# Patient Record
Sex: FEMALE | Race: WHITE | NOT HISPANIC OR LATINO | Employment: PART TIME | URBAN - METROPOLITAN AREA
[De-identification: names, ages, dates, MRNs, and addresses within clinical notes are randomized per-mention and may not be internally consistent; named-entity substitution may affect disease eponyms.]

---

## 2017-01-24 ENCOUNTER — ALLSCRIPTS OFFICE VISIT (OUTPATIENT)
Dept: OTHER | Facility: OTHER | Age: 22
End: 2017-01-24

## 2017-01-26 ENCOUNTER — TRANSCRIBE ORDERS (OUTPATIENT)
Dept: LAB | Facility: CLINIC | Age: 22
End: 2017-01-26

## 2017-01-26 ENCOUNTER — APPOINTMENT (OUTPATIENT)
Dept: LAB | Facility: CLINIC | Age: 22
End: 2017-01-26
Payer: COMMERCIAL

## 2017-01-26 DIAGNOSIS — R53.83 OTHER MALAISE AND FATIGUE: ICD-10-CM

## 2017-01-26 DIAGNOSIS — Z13.6 SCREENING FOR ISCHEMIC HEART DISEASE: ICD-10-CM

## 2017-01-26 DIAGNOSIS — R42 DIZZINESS AND GIDDINESS: Primary | ICD-10-CM

## 2017-01-26 DIAGNOSIS — R53.81 OTHER MALAISE AND FATIGUE: ICD-10-CM

## 2017-01-26 LAB
ALBUMIN SERPL BCP-MCNC: 3.6 G/DL (ref 3.5–5)
ALP SERPL-CCNC: 64 U/L (ref 46–116)
ALT SERPL W P-5'-P-CCNC: 20 U/L (ref 12–78)
ANION GAP SERPL CALCULATED.3IONS-SCNC: 7 MMOL/L (ref 4–13)
AST SERPL W P-5'-P-CCNC: 16 U/L (ref 5–45)
BASOPHILS # BLD AUTO: 0.02 THOUSANDS/ΜL (ref 0–0.1)
BASOPHILS NFR BLD AUTO: 0 % (ref 0–1)
BILIRUB SERPL-MCNC: 0.39 MG/DL (ref 0.2–1)
BUN SERPL-MCNC: 13 MG/DL (ref 5–25)
CALCIUM SERPL-MCNC: 8.9 MG/DL (ref 8.3–10.1)
CHLORIDE SERPL-SCNC: 104 MMOL/L (ref 100–108)
CHOLEST SERPL-MCNC: 175 MG/DL (ref 50–200)
CO2 SERPL-SCNC: 27 MMOL/L (ref 21–32)
CREAT SERPL-MCNC: 0.58 MG/DL (ref 0.6–1.3)
EOSINOPHIL # BLD AUTO: 0.16 THOUSAND/ΜL (ref 0–0.61)
EOSINOPHIL NFR BLD AUTO: 3 % (ref 0–6)
ERYTHROCYTE [DISTWIDTH] IN BLOOD BY AUTOMATED COUNT: 12.6 % (ref 11.6–15.1)
GFR SERPL CREATININE-BSD FRML MDRD: >60 ML/MIN/1.73SQ M
GLUCOSE SERPL-MCNC: 91 MG/DL (ref 65–140)
HCT VFR BLD AUTO: 38.3 % (ref 34.8–46.1)
HDLC SERPL-MCNC: 61 MG/DL (ref 40–60)
HGB BLD-MCNC: 13.1 G/DL (ref 11.5–15.4)
LDLC SERPL CALC-MCNC: 105 MG/DL (ref 0–100)
LYMPHOCYTES # BLD AUTO: 2.47 THOUSANDS/ΜL (ref 0.6–4.47)
LYMPHOCYTES NFR BLD AUTO: 41 % (ref 14–44)
MCH RBC QN AUTO: 30 PG (ref 26.8–34.3)
MCHC RBC AUTO-ENTMCNC: 34.2 G/DL (ref 31.4–37.4)
MCV RBC AUTO: 88 FL (ref 82–98)
MONOCYTES # BLD AUTO: 0.45 THOUSAND/ΜL (ref 0.17–1.22)
MONOCYTES NFR BLD AUTO: 7 % (ref 4–12)
NEUTROPHILS # BLD AUTO: 2.97 THOUSANDS/ΜL (ref 1.85–7.62)
NEUTS SEG NFR BLD AUTO: 49 % (ref 43–75)
NRBC BLD AUTO-RTO: 0 /100 WBCS
PLATELET # BLD AUTO: 231 THOUSANDS/UL (ref 149–390)
PMV BLD AUTO: 11.7 FL (ref 8.9–12.7)
POTASSIUM SERPL-SCNC: 3.9 MMOL/L (ref 3.5–5.3)
PROT SERPL-MCNC: 7.8 G/DL (ref 6.4–8.2)
RBC # BLD AUTO: 4.37 MILLION/UL (ref 3.81–5.12)
SODIUM SERPL-SCNC: 138 MMOL/L (ref 136–145)
TRIGL SERPL-MCNC: 46 MG/DL
TSH SERPL DL<=0.05 MIU/L-ACNC: 2.32 UIU/ML (ref 0.36–3.74)
WBC # BLD AUTO: 6.07 THOUSAND/UL (ref 4.31–10.16)

## 2017-01-26 PROCEDURE — 86617 LYME DISEASE ANTIBODY: CPT

## 2017-01-26 PROCEDURE — 36415 COLL VENOUS BLD VENIPUNCTURE: CPT

## 2017-01-26 PROCEDURE — 80061 LIPID PANEL: CPT

## 2017-01-26 PROCEDURE — 85025 COMPLETE CBC W/AUTO DIFF WBC: CPT

## 2017-01-26 PROCEDURE — 80053 COMPREHEN METABOLIC PANEL: CPT

## 2017-01-26 PROCEDURE — 84443 ASSAY THYROID STIM HORMONE: CPT

## 2017-01-27 LAB
B BURGDOR IGG PATRN SER IB-IMP: NEGATIVE
B BURGDOR IGM PATRN SER IB-IMP: NEGATIVE
B BURGDOR18KD IGG SER QL IB: PRESENT
B BURGDOR23KD IGG SER QL IB: ABNORMAL
B BURGDOR23KD IGM SER QL IB: ABNORMAL
B BURGDOR28KD IGG SER QL IB: ABNORMAL
B BURGDOR30KD IGG SER QL IB: ABNORMAL
B BURGDOR39KD IGG SER QL IB: ABNORMAL
B BURGDOR39KD IGM SER QL IB: ABNORMAL
B BURGDOR41KD IGG SER QL IB: PRESENT
B BURGDOR41KD IGM SER QL IB: ABNORMAL
B BURGDOR45KD IGG SER QL IB: ABNORMAL
B BURGDOR58KD IGG SER QL IB: ABNORMAL
B BURGDOR66KD IGG SER QL IB: ABNORMAL
B BURGDOR93KD IGG SER QL IB: ABNORMAL

## 2017-01-31 ENCOUNTER — GENERIC CONVERSION - ENCOUNTER (OUTPATIENT)
Dept: OTHER | Facility: OTHER | Age: 22
End: 2017-01-31

## 2017-03-01 ENCOUNTER — ALLSCRIPTS OFFICE VISIT (OUTPATIENT)
Dept: OTHER | Facility: OTHER | Age: 22
End: 2017-03-01

## 2017-04-03 ENCOUNTER — ALLSCRIPTS OFFICE VISIT (OUTPATIENT)
Dept: OTHER | Facility: OTHER | Age: 22
End: 2017-04-03

## 2017-10-09 ENCOUNTER — ALLSCRIPTS OFFICE VISIT (OUTPATIENT)
Dept: OTHER | Facility: OTHER | Age: 22
End: 2017-10-09

## 2017-10-10 ENCOUNTER — GENERIC CONVERSION - ENCOUNTER (OUTPATIENT)
Dept: OTHER | Facility: OTHER | Age: 22
End: 2017-10-10

## 2017-10-10 NOTE — PROGRESS NOTES
Assessment  1  Dizziness (780 4) (R42)   2  Tinnitus (388 30) (H93 19)   3  BMI 26 0-26 9,adult (V85 22) (Z68 26)   4  Need for immunization against influenza (V04 81) (Z23)    Plan  Dizziness, Tinnitus    · Meclizine HCl - 25 MG Oral Tablet; TAKE 1 TABLET 3 TIMES DAILY AS NEEDED   · 1 - Garry Hui MD, Brandon Byrd  (Otolaryngology) Co-Management  *  Status: Active  Requested for:  [id number]  Care Summary provided  : Yes    Discussion/Summary    Will refer to ENT for possible inner ear disturbance  Will trial Meclizine  had CT head done with the birth of her son d/t dizziness after she received pain medication, which she states was normal    Possible side effects of new medications were reviewed with the patient/guardian today  The treatment plan was reviewed with the patient/guardian  The patient/guardian understands and agrees with the treatment plan      Chief Complaint  C/O dizziness that started 3 days ago  States  my body is all weak No headache or fevers [id number]      History of Present Illness  HPI: 3 days ago she started to feel dizzy  Symptoms occur as soon as she stands up and also when she is walking around  She feels like the room is spinning, light headed, and her knees feel weak  Denies chest pain, SOB  Sometimes it feels like her heart is racing  Has been to the ER for this and also for chest pain, negative cardiac workup  Denies headache, visual disturbance  Also notes ringing in her ears when this happens  Denies feeling anxious      Review of Systems    Constitutional: feeling tired, but-no fever-and-no chills  Cardiovascular: palpitations, but-no chest pain-and-no lower extremity edema  Respiratory: shortness of breath, but-no cough-and-no wheezing  Gastrointestinal: no abdominal pain,-no nausea,-no vomiting-and-no diarrhea  Neurological: dizziness, but-no headache  Active Problems  1  BMI 26 0-26 9,adult (V85 22) (Z68 26)   2  Dermatitis (692 9) (L30 9)   3  Dizziness (780 4) (R42)   4   Fatigue (780 79) (R53 83)   5  Never a smoker   6  Strain of muscle, fascia and tendon of lower back, subsequent encounter (846 9)   (G53 286V)    Past Medical History  1  Screening for cardiovascular condition (V81 2) (Z13 6)  Active Problems And Past Medical History Reviewed: The active problems and past medical history were reviewed and updated today  Family History  Mother    1  Family history of thyroid disease (V18 19) (Z83 49)   2  Family history of type 2 diabetes mellitus (V18 0) (Z83 3)  Father    3  Family history of asthma (V17 5) (Z82 5)  Maternal Grandmother    4  Family history of breast cancer (V16 3) (Z80 3)   5  Family history of cardiac disorder (V17 49) (Z82 49)   6  Family history of hypertension (V17 49) (Z82 49)  Paternal Grandmother    9  Family history of malignant neoplasm of thyroid (V16 8) (Z80 8)  Maternal Grandfather    8  Family history of cardiac disorder (V17 49) (Z82 49)   9  Family history of hypertension (V17 49) (Z82 49)  Family History Reviewed: The family history was reviewed and updated today  Social History   · Never a smoker  The social history was reviewed and is unchanged  Surgical History  1  History of  Section   2  History of Tonsillectomy    Current Meds   1  No Reported Medications Recorded    The medication list was reviewed and updated today  Allergies  1  No Known Drug Allergies    Vitals   Recorded: [id number] 02:27PM   Temperature 97 F   Heart Rate 68   Respiration 16   Systolic 98   Diastolic 60   Height 5 ft 1 75 in   Weight 146 lb    BMI Calculated 26 92   BSA Calculated 1 67   LMP [id number]     Physical Exam    Constitutional   General appearance: No acute distress, well appearing and well nourished  Eyes   Conjunctiva and lids: No swelling, erythema or discharge  Pupils and irises: Equal, round and reactive to light      Ears, Nose, Mouth, and Throat   Otoscopic examination: Tympanic membranes translucent with normal light reflex  Canals patent without erythema  Nasal mucosa, septum, and turbinates: Normal without edema or erythema  Oropharynx: Normal with no erythema, edema, exudate or lesions  Pulmonary   Respiratory effort: No increased work of breathing or signs of respiratory distress  Auscultation of lungs: Clear to auscultation  Cardiovascular   Auscultation of heart: Normal rate and rhythm, normal S1 and S2, without murmurs  Examination of extremities for edema and/or varicosities: Normal     Lymphatic   Palpation of lymph nodes in neck: No lymphadenopathy  Skin   Skin and subcutaneous tissue: Normal without rashes or lesions  Additional Exam:  orthostatic VS normal  Normal finger to nose and heel to shin tests  Negative Neftali-Hallpike  Attending Note  Collaborating Physician Note: Collaborating Note: I agree with the Advanced Practitioner note        Signatures   Electronically signed by : Maxine Walsh; Oct  9 2017  3:13PM EST                       (Author)    Electronically signed by : Penny Tyler DO; Oct  9 2017  8:11PM EST                       (Author)

## 2017-12-04 ENCOUNTER — APPOINTMENT (EMERGENCY)
Dept: RADIOLOGY | Facility: HOSPITAL | Age: 22
End: 2017-12-04
Payer: COMMERCIAL

## 2017-12-04 ENCOUNTER — HOSPITAL ENCOUNTER (EMERGENCY)
Facility: HOSPITAL | Age: 22
Discharge: HOME | End: 2017-12-04
Attending: EMERGENCY MEDICINE | Admitting: EMERGENCY MEDICINE
Payer: COMMERCIAL

## 2017-12-04 VITALS
HEART RATE: 89 BPM | BODY MASS INDEX: 25.76 KG/M2 | WEIGHT: 140 LBS | HEIGHT: 62 IN | TEMPERATURE: 98.6 F | DIASTOLIC BLOOD PRESSURE: 55 MMHG | RESPIRATION RATE: 16 BRPM | OXYGEN SATURATION: 98 % | SYSTOLIC BLOOD PRESSURE: 105 MMHG

## 2017-12-04 DIAGNOSIS — M54.9 UPPER BACK PAIN ON LEFT SIDE: Primary | ICD-10-CM

## 2017-12-04 LAB — EXT PREG TEST URINE: NORMAL

## 2017-12-04 PROCEDURE — 71100 X-RAY EXAM RIBS UNI 2 VIEWS: CPT

## 2017-12-04 PROCEDURE — 81025 URINE PREGNANCY TEST: CPT | Performed by: EMERGENCY MEDICINE

## 2017-12-04 PROCEDURE — 99284 EMERGENCY DEPT VISIT MOD MDM: CPT

## 2017-12-04 RX ORDER — NAPROXEN 500 MG/1
500 TABLET ORAL ONCE
Status: COMPLETED | OUTPATIENT
Start: 2017-12-04 | End: 2017-12-04

## 2017-12-04 RX ORDER — NAPROXEN 500 MG/1
500 TABLET ORAL 2 TIMES DAILY WITH MEALS
Qty: 10 TABLET | Refills: 0 | Status: SHIPPED | OUTPATIENT
Start: 2017-12-04 | End: 2018-02-24

## 2017-12-04 RX ADMIN — NAPROXEN 500 MG: 500 TABLET ORAL at 19:33

## 2017-12-05 NOTE — DISCHARGE INSTRUCTIONS
Back Pain   WHAT YOU NEED TO KNOW:   Back pain is common  It can be caused by many conditions, such as arthritis or the breakdown of spinal discs  Your risk for back pain is increased by injuries, lack of activity, or repeated bending and twisting  You may feel sore or stiff on one or both sides of your back  The pain may spread to your buttocks or thighs  DISCHARGE INSTRUCTIONS:   Medicines:   · NSAIDs  help decrease swelling and pain  This medicine is available with or without a doctor's order  NSAIDs can cause stomach bleeding or kidney problems in certain people  If you take blood thinner medicine, always ask your healthcare provider if NSAIDs are safe for you  Always read the medicine label and follow directions  · Acetaminophen  decreases pain  It is available without a doctor's order  Ask how much to take and how often to take it  Follow directions  Acetaminophen can cause liver damage if not taken correctly  · Prescription pain medicine  may be given  Ask your healthcare provider how to take this medicine safely  · Take your medicine as directed  Contact your healthcare provider if you think your medicine is not helping or if you have side effects  Tell him or her if you are allergic to any medicine  Keep a list of the medicines, vitamins, and herbs you take  Include the amounts, and when and why you take them  Bring the list or the pill bottles to follow-up visits  Carry your medicine list with you in case of an emergency  Follow up with your healthcare provider in 2 weeks, or as directed:  Write down your questions so you remember to ask them during your visits  How to manage your back pain:   · Apply ice  on your back or affected area for 15 to 20 minutes every hour or as directed  Use an ice pack, or put crushed ice in a plastic bag  Cover it with a towel  Ice helps prevent tissue damage and decreases pain      · Apply heat  on your back or affected area for 20 to 30 minutes every 2 hours for as many days as directed  Heat helps decrease pain and muscle spasms  · Stay active  as much as you can without causing more pain  Bed rest could make your back pain worse  Avoid heavy lifting until your pain is gone  Return to the emergency department if:   · You have pain, numbness, or weakness in one or both legs  · Your pain becomes so severe that you cannot walk  · You cannot control your urine or bowel movements  · You have severe back pain with chest pain  · You have severe back pain, nausea, and vomiting  · You have severe back pain that spreads to your side or genital area  Contact your healthcare provider if:   · You have back pain that does not get better with rest and pain medicine  · You have a fever  · You have pain that worsens when you are on your back or when you rest     · You have pain that worsens when you cough or sneeze  · You lose weight without trying  · You have questions or concerns about your condition or care  © 2017 [address] Information is for End User's use only and may not be sold, redistributed or otherwise used for commercial purposes  All illustrations and images included in CareNotes® are the copyrighted property of [employer] , [employer]  or Reinaldo Boone  The above information is an  only  It is not intended as medical advice for individual conditions or treatments  Talk to your doctor, nurse or pharmacist before following any medical regimen to see if it is safe and effective for you

## 2017-12-05 NOTE — ED PROVIDER NOTES
History  Chief Complaint   Patient presents with    Back Pain     pain in upper thoracic spine are s/p fall on kitchen floor  5 hrs ago     PT FELL 4 HRS AGO, C/O LT UPPER BACK PAIN        Back Pain   Pain location: LEFT UPPER BACK  Quality:  Aching  Radiates to:  Does not radiate  Pain severity:  Mild  Onset quality:  Sudden  Duration:  4 hours  Timing:  Constant  Chronicity:  New  Context: falling    Relieved by:  None tried  Worsened by:  Nothing  Ineffective treatments:  None tried  Associated symptoms: no headaches        None       No past medical history on file  Past Surgical History:   Procedure Laterality Date     SECTION         No family history on file  I have reviewed and agree with the history as documented  Social History   Substance Use Topics    Smoking status: Never Smoker    Smokeless tobacco: Not on file    Alcohol use No        Review of Systems   Constitutional: Negative  Musculoskeletal: Positive for back pain  Skin: Negative  Neurological: Negative  Negative for headaches  Physical Exam  ED Triage Vitals [17]   Temperature Pulse Respirations Blood Pressure SpO2   98 6 °F (37 °C) 89 16 105/55 98 %      Temp Source Heart Rate Source Patient Position - Orthostatic VS BP Location FiO2 (%)   Tympanic Monitor Sitting Right arm --      Pain Score       8           Orthostatic Vital Signs  Vitals:    17   BP: 105/55   Pulse: 89   Patient Position - Orthostatic VS: Sitting       Physical Exam   Constitutional: She is oriented to person, place, and time  She appears well-developed and well-nourished  No distress  Musculoskeletal: She exhibits tenderness  She exhibits no deformity  Arms:  Neurological: She is alert and oriented to person, place, and time  Skin: Skin is warm and dry  She is not diaphoretic  Nursing note and vitals reviewed        ED Medications  Medications   naproxen (NAPROSYN) tablet 500 mg (500 mg Oral Given 12/4/17 1933)       Diagnostic Studies  Results Reviewed     Procedure Component Value Units Date/Time    POCT pregnancy, urine [[id number]]  (Normal) Resulted:  12/04/17 1933    Lab Status:  Final result Updated:  12/04/17 1933     EXT PREG TEST UR (Ref: Negative) neg                 XR ribs 2 views LEFT   ED Interpretation by Gregoria Bo MD (12/04 2023)   NEG                 Procedures  Procedures       Phone Contacts  ED Phone Contact    ED Course  ED Course                                MDM  CritCare Time    Disposition  Final diagnoses:   None     ED Disposition     None      Follow-up Information    None       Patient's Medications   Discharge Prescriptions    No medications on file     No discharge procedures on file      ED Provider  Electronically Signed by           Gregoria Bo MD  12/04/17 2025

## 2017-12-14 ENCOUNTER — GENERIC CONVERSION - ENCOUNTER (OUTPATIENT)
Dept: OTHER | Facility: OTHER | Age: 22
End: 2017-12-14

## 2018-01-10 NOTE — RESULT NOTES
Verified Results  (1) LYME ANTIBODY, WESTERN BLOT [id number] 10:29AM Shanti Miriam     Test Name Result Flag Reference   LYME 18 KD IGG Present A    LYME 23 KD IGG Absent     LYME 28 KD IGG Absent     LYME 30 KD IGG Absent     LYME 39 KD IGG Absent     LYME 41 KD IGG Present A    LYME 45 KD IGG Absent     LYME 58 KD IGG Absent     LYME 66 KD IGG Absent     LYME 93 KD IGG Absent     LYME 23 KD IGM Absent     LYME 39 KD IGM Absent     LYME 41 KD IGM Absent     LYME IGG WB INTERP  Negative     Positive: 5 of the following                                 Borrelia-specific bands:                                 18,23,28,30,39,41,45,58,                                 66, and 93  Negative: No bands or banding                                 patterns which do not                                 meet positive criteria  LYME IGM WB INTERP  Negative     Note: An equivocal or positive EIA result followed by a negative  Western Blot result is considered NEGATIVE  An equivocal or positive  EIA result followed by a positive Western Blot is considered POSITIVE  by the CDC  Positive: 2 of the following bands: 23,39 or 41  Negative: No bands or banding patterns which do not meet positive  criteria  Criteria for positivity are those recommended by CDC/ASTPHLD   p23=Osp C, h03=igkaayyfh  Note:  Sera from individuals with the following may cross react in the  Lyme Western Blot assays: other spirochetal diseases (periodontal  disease, leptospirosis, relapsing fever, yaws, and pinta);  connective autoimmune (Rheumatoid Arthritis and Systemic Lupus  Erythematosus and also individuals with Antinuclear Antibody);  other infections COFFEE [facility] Spotted Fever; Ghassan-Barr Virus,  and Cytomegalovirus)    Performed at:  [address] [place] [address]  [id number]  : Aline Felipe MD, Phone:  [phone]     (1) CBC/PLT/DIFF [id number] 10:28AM Mellisa Bryant     Test Name Result Flag Reference   WBC COUNT 6 07 Thousand/uL  4 31-10 16   RBC COUNT 4 37 Million/uL  3 81-5 12   HEMOGLOBIN 13 1 g/dL  11 5-15 4   HEMATOCRIT 38 3 %  34 8-46  1   MCV 88 fL  82-98   MCH 30 0 pg  26 8-34 3   MCHC 34 2 g/dL  31 4-37 4   RDW 12 6 %  11 6-15 1   MPV 11 7 fL  8 9-12 7   PLATELET COUNT 882 Thousands/uL  149-390   nRBC AUTOMATED 0 /100 WBCs     NEUTROPHILS RELATIVE PERCENT 49 %  43-75   LYMPHOCYTES RELATIVE PERCENT 41 %  14-44   MONOCYTES RELATIVE PERCENT 7 %  4-12   EOSINOPHILS RELATIVE PERCENT 3 %  0-6   BASOPHILS RELATIVE PERCENT 0 %  0-1   NEUTROPHILS ABSOLUTE COUNT 2 97 Thousands/?L  1 85-7 62   LYMPHOCYTES ABSOLUTE COUNT 2 47 Thousands/?L  0 60-4 47   MONOCYTES ABSOLUTE COUNT 0 45 Thousand/?L  0 17-1 22   EOSINOPHILS ABSOLUTE COUNT 0 16 Thousand/?L  0 00-0 61   BASOPHILS ABSOLUTE COUNT 0 02 Thousands/?L  0 00-0 10     (1) COMPREHENSIVE METABOLIC PANEL [id number] 47:82IJ Anil Gavel     Test Name Result Flag Reference   GLUCOSE,RANDM 91 mg/dL     If the patient is fasting, the ADA then defines impaired fasting glucose as > 100 mg/dL and diabetes as > or equal to 123 mg/dL  SODIUM 138 mmol/L  136-145   POTASSIUM 3 9 mmol/L  3 5-5 3   CHLORIDE 104 mmol/L  100-108   CARBON DIOXIDE 27 mmol/L  21-32   ANION GAP (CALC) 7 mmol/L  4-13   BLOOD UREA NITROGEN 13 mg/dL  5-25   CREATININE 0 58 mg/dL L 0 60-1 30   Standardized to IDMS reference method   CALCIUM 8 9 mg/dL  8 3-10 1   BILI, TOTAL 0 39 mg/dL  0 20-1 00   ALK PHOSPHATAS 64 U/L     ALT (SGPT) 20 U/L  12-78   AST(SGOT) 16 U/L  5-45   ALBUMIN 3 6 g/dL  3 5-5 0   TOTAL PROTEIN 7 8 g/dL  6 4-8 2   eGFR Non-African American      >60 0 ml/min/1 73sq m   [facility] Disease Education Program recommendations are as follows:  GFR calculation is accurate only with a steady state creatinine  Chronic Kidney disease less than 60 ml/min/1 73 sq  meters  Kidney failure less than 15 ml/min/1 73 sq  meters       (1) LIPID PANEL, FASTING [id number] 10:28AM Juventino Richardson     Test Name Result Flag Reference   CHOLESTEROL 175 mg/dL     HDL,DIRECT 61 mg/dL H 40-60   Specimen collection should occur prior to Metamizole administration due to the potential for falsely depressed results  LDL CHOLESTEROL CALCULATED 105 mg/dL H 0-100   Triglyceride:         Normal              <150 mg/dl       Borderline High    150-199 mg/dl       High               200-499 mg/dl       Very High          >499 mg/dl  Cholesterol:         Desirable        <200 mg/dl      Borderline High  200-239 mg/dl      High             >239 mg/dl  HDL Cholesterol:        High    >59 mg/dL      Low     <41 mg/dL  LDL CALCULATED:    This screening LDL is a calculated result  It does not have the accuracy of the Direct Measured LDL in the monitoring of patients with hyperlipidemia and/or statin therapy  Direct Measure LDL (AXV485) must be ordered separately in these patients  TRIGLYCERIDES 46 mg/dL  <=150   Specimen collection should occur prior to N-Acetylcysteine or Metamizole administration due to the potential for falsely depressed results  (1) TSH WITH FT4 REFLEX [id number] 10:28AM Miriam Moser     Test Name Result Flag Reference   TSH 2 320 uIU/mL  0 358-3 740   Patients undergoing fluorescein dye angiography may retain small amounts of fluorescein in the body for 48-72 hours post procedure  Samples containing fluorescein can produce falsely depressed TSH values  If the patient had this procedure,a specimen should be resubmitted post fluorescein clearance  The recommended reference ranges for TSH during pregnancy are as follows:  First trimester 0 1 to 2 5 uIU/mL  Second trimester  0 2 to 3 0 uIU/mL  Third trimester 0 3 to 3 0 uIU/m       Discussion/Summary     Andrey Pop- your blood counts, kidney, liver, and thyroid function are normal   Cholesterol is normal   Lyme test is negative  [address]     Signatures   Electronically signed by : GARRY Mckeon; Jan 31 2017  9:02AM EST                       (Author)

## 2018-01-11 NOTE — MISCELLANEOUS
Provider Comments  Provider Comments:   No show for 40 min IE        Signatures   Electronically signed by : Becky Rodriguez DO; Oct 21 2016  3:35PM EST                       (Author)

## 2018-01-13 VITALS
RESPIRATION RATE: 16 BRPM | HEART RATE: 68 BPM | DIASTOLIC BLOOD PRESSURE: 60 MMHG | BODY MASS INDEX: 26.87 KG/M2 | SYSTOLIC BLOOD PRESSURE: 98 MMHG | TEMPERATURE: 97 F | WEIGHT: 146 LBS | HEIGHT: 62 IN

## 2018-01-13 VITALS
HEART RATE: 74 BPM | SYSTOLIC BLOOD PRESSURE: 110 MMHG | RESPIRATION RATE: 18 BRPM | TEMPERATURE: 97.2 F | WEIGHT: 144 LBS | DIASTOLIC BLOOD PRESSURE: 70 MMHG | BODY MASS INDEX: 26.5 KG/M2 | HEIGHT: 62 IN

## 2018-01-14 VITALS
HEIGHT: 62 IN | BODY MASS INDEX: 26.5 KG/M2 | SYSTOLIC BLOOD PRESSURE: 120 MMHG | RESPIRATION RATE: 16 BRPM | HEART RATE: 72 BPM | TEMPERATURE: 98.6 F | WEIGHT: 144 LBS | DIASTOLIC BLOOD PRESSURE: 70 MMHG

## 2018-01-15 NOTE — MISCELLANEOUS
Provider Comments  Provider Comments:   lmom regarding pt no show on 4/3/2017      Signatures   Electronically signed by : Saray Merrill DO;  Apr  3 2017 12:08PM EST                       (Author)

## 2018-01-22 VITALS
SYSTOLIC BLOOD PRESSURE: 96 MMHG | BODY MASS INDEX: 26.31 KG/M2 | HEIGHT: 62 IN | HEART RATE: 88 BPM | WEIGHT: 143 LBS | DIASTOLIC BLOOD PRESSURE: 58 MMHG | RESPIRATION RATE: 16 BRPM | TEMPERATURE: 98.4 F

## 2018-01-24 VITALS
TEMPERATURE: 98.2 F | BODY MASS INDEX: 25.95 KG/M2 | DIASTOLIC BLOOD PRESSURE: 70 MMHG | WEIGHT: 141 LBS | SYSTOLIC BLOOD PRESSURE: 108 MMHG | HEART RATE: 96 BPM | HEIGHT: 62 IN | RESPIRATION RATE: 16 BRPM

## 2018-02-24 ENCOUNTER — HOSPITAL ENCOUNTER (EMERGENCY)
Facility: HOSPITAL | Age: 23
Discharge: HOME | End: 2018-02-24
Attending: EMERGENCY MEDICINE | Admitting: EMERGENCY MEDICINE
Payer: COMMERCIAL

## 2018-02-24 ENCOUNTER — APPOINTMENT (EMERGENCY)
Dept: RADIOLOGY | Facility: HOSPITAL | Age: 23
End: 2018-02-24
Payer: COMMERCIAL

## 2018-02-24 VITALS
OXYGEN SATURATION: 98 % | SYSTOLIC BLOOD PRESSURE: 110 MMHG | HEART RATE: 92 BPM | TEMPERATURE: 97.8 F | RESPIRATION RATE: 20 BRPM | DIASTOLIC BLOOD PRESSURE: 68 MMHG

## 2018-02-24 DIAGNOSIS — R10.9 ABDOMINAL PAIN: Primary | ICD-10-CM

## 2018-02-24 LAB
BACTERIA UR QL AUTO: ABNORMAL /HPF
BILIRUB UR QL STRIP: NEGATIVE
CLARITY UR: CLEAR
COLOR UR: YELLOW
EXT PREG TEST URINE: NEGATIVE
GLUCOSE UR STRIP-MCNC: NEGATIVE MG/DL
HGB UR QL STRIP.AUTO: ABNORMAL
KETONES UR STRIP-MCNC: NEGATIVE MG/DL
LEUKOCYTE ESTERASE UR QL STRIP: NEGATIVE
NITRITE UR QL STRIP: NEGATIVE
NON-SQ EPI CELLS URNS QL MICRO: ABNORMAL /HPF
PH UR STRIP.AUTO: 5.5 [PH] (ref 5–9)
PROT UR STRIP-MCNC: NEGATIVE MG/DL
RBC #/AREA URNS AUTO: ABNORMAL /HPF
SP GR UR STRIP.AUTO: 1.02 (ref 1–1.03)
UROBILINOGEN UR QL STRIP.AUTO: 0.2 E.U./DL
WBC #/AREA URNS AUTO: ABNORMAL /HPF

## 2018-02-24 PROCEDURE — 81025 URINE PREGNANCY TEST: CPT | Performed by: EMERGENCY MEDICINE

## 2018-02-24 PROCEDURE — 99284 EMERGENCY DEPT VISIT MOD MDM: CPT

## 2018-02-24 PROCEDURE — 81001 URINALYSIS AUTO W/SCOPE: CPT | Performed by: EMERGENCY MEDICINE

## 2018-02-24 PROCEDURE — 74176 CT ABD & PELVIS W/O CONTRAST: CPT

## 2018-02-24 RX ORDER — TRAMADOL HYDROCHLORIDE 50 MG/1
TABLET ORAL
Qty: 6 TABLET | Refills: 0 | Status: SHIPPED | OUTPATIENT
Start: 2018-02-24 | End: 2018-07-03

## 2018-02-24 RX ORDER — NAPROXEN 500 MG/1
500 TABLET ORAL 2 TIMES DAILY WITH MEALS
Qty: 10 TABLET | Refills: 0 | Status: SHIPPED | OUTPATIENT
Start: 2018-02-24 | End: 2018-07-03

## 2018-02-24 RX ORDER — NAPROXEN 500 MG/1
500 TABLET ORAL ONCE
Status: COMPLETED | OUTPATIENT
Start: 2018-02-24 | End: 2018-02-24

## 2018-02-24 RX ORDER — TRAMADOL HYDROCHLORIDE 50 MG/1
50 TABLET ORAL ONCE
Status: COMPLETED | OUTPATIENT
Start: 2018-02-24 | End: 2018-02-24

## 2018-02-24 RX ADMIN — TRAMADOL HYDROCHLORIDE 50 MG: 50 TABLET, FILM COATED ORAL at 21:08

## 2018-02-24 RX ADMIN — NAPROXEN 500 MG: 500 TABLET ORAL at 21:08

## 2018-02-25 NOTE — DISCHARGE INSTRUCTIONS
Abdominal Pain, Ambulatory Care - your CT scan is normal and your pregnancy test is negative  Get the ultrasound scheduled and make a follow up appointment with your doctor  In the meantime, you can use the prescribed medicines and a heating pad as needed for discomfort  GENERAL INFORMATION:   Abdominal pain  can be dull, achy, or sharp  You may have pain in one area of your abdomen, or in your entire abdomen  Your pain may be caused by constipation, food sensitivity or poisoning, infection, or a blockage  Abdominal pain can also be caused by a hernia, appendicitis, or an ulcer  The cause of your abdominal pain may be unknown  Sometimes you need further testing beyond what we can do in the ER  Seek immediate care for the following symptoms:   · New chest pain or shortness of breath    · Pulsing pain in your upper abdomen or lower back that suddenly becomes constant    · Pain in the right lower abdominal area that worsens with movement    · Fever over 100 4°F (38°C) or shaking chills    · Vomiting and you cannot keep food or fluids down    · Pain does not improve or gets worse over the next 8 to 12 hours    · Blood in your vomit or bowel movements, or they look black and tarry    · Skin or the whites of your eyes turn yellow    · Large amount of vaginal bleeding that is not your monthly period  Treatment for abdominal pain  may include medicine to calm your stomach, prevent vomiting, or decrease pain  Follow up with your healthcare provider as directed:  Write down your questions so you remember to ask them during your visits  CARE AGREEMENT:   You have the right to help plan your care  Learn about your health condition and how it may be treated  Discuss treatment options with your caregivers to decide what care you want to receive  You always have the right to refuse treatment  The above information is an  only  It is not intended as medical advice for individual conditions or treatments   Talk to your doctor, nurse or pharmacist before following any medical regimen to see if it is safe and effective for you  © 2014 [address] is for End User's use only and may not be sold, redistributed or otherwise used for commercial purposes  All illustrations and images included in CareNotes® are the copyrighted property of A D A M [employer]  or Reinaldo Boone

## 2018-02-25 NOTE — ED PROVIDER NOTES
History  Chief Complaint   Patient presents with    Abdominal Pain     LLQpain today  states goes up L side and down L thigh  has had menses for past three days which is her normal menses, never had this pain before     19 yo female c/o left lower abdominal pain off and on x 6 hours  Is on day 3 of normal period  Periods have been regular  Is  and sexually active, not using birth control because she is trying to get pregnant  No vomiting, diarrhea, fever, dysuria  Pain radiates into left flank and down left groin and into left thigh  History provided by:  Patient   used: No    Abdominal Pain   Associated symptoms: no chest pain, no cough, no diarrhea, no dysuria, no fever, no nausea, no shortness of breath and no vomiting        Prior to Admission Medications   Prescriptions Last Dose Informant Patient Reported? Taking? Prenatal MV-Min-Fe Fum-FA-DHA (PRENATAL 1 PO)   Yes Yes   Sig: Take by mouth daily      Facility-Administered Medications: None       History reviewed  No pertinent past medical history  Past Surgical History:   Procedure Laterality Date     SECTION         History reviewed  No pertinent family history  I have reviewed and agree with the history as documented  Social History   Substance Use Topics    Smoking status: Never Smoker    Smokeless tobacco: Never Used    Alcohol use No        Review of Systems   Constitutional: Negative  Negative for fever  HENT: Negative  Eyes: Negative  Respiratory: Negative  Negative for cough and shortness of breath  Cardiovascular: Negative  Negative for chest pain  Gastrointestinal: Positive for abdominal pain  Negative for diarrhea, nausea and vomiting  Genitourinary: Negative  Negative for dysuria and flank pain  Musculoskeletal: Negative  Negative for back pain and myalgias  Skin: Negative  Negative for rash  Neurological: Negative  Negative for dizziness and headaches  Hematological: Does not bruise/bleed easily  Psychiatric/Behavioral: Negative  All other systems reviewed and are negative  Physical Exam  ED Triage Vitals [02/24/18 1955]   Temperature Pulse Respirations Blood Pressure SpO2   97 8 °F (36 6 °C) 92 20 110/68 98 %      Temp Source Heart Rate Source Patient Position - Orthostatic VS BP Location FiO2 (%)   Tympanic Monitor Sitting Right arm --      Pain Score       5           Orthostatic Vital Signs  Vitals:    02/24/18 1955   BP: 110/68   Pulse: 92   Patient Position - Orthostatic VS: Sitting       Physical Exam   Constitutional: She appears well-developed and well-nourished  No distress  HENT:   Head: Normocephalic and atraumatic  Eyes: Conjunctivae are normal  Pupils are equal, round, and reactive to light  Neck: Normal range of motion  Neck supple  Cardiovascular: Normal rate, regular rhythm and normal heart sounds  No murmur heard  Pulmonary/Chest: Effort normal and breath sounds normal  No respiratory distress  Abdominal: Soft  Bowel sounds are normal  She exhibits no distension  There is tenderness  There is no guarding  Mild vague ttp suprapubic   Musculoskeletal: Normal range of motion  She exhibits no edema or deformity  Neurological: She is alert  No cranial nerve deficit  Skin: Skin is warm and dry  No rash noted  She is not diaphoretic  No pallor  Psychiatric: She has a normal mood and affect  Her behavior is normal    Nursing note and vitals reviewed        ED Medications  Medications   naproxen (NAPROSYN) tablet 500 mg (not administered)   traMADol (ULTRAM) tablet 50 mg (not administered)       Diagnostic Studies  Results Reviewed     Procedure Component Value Units Date/Time    Urine Microscopic [[id number]]  (Abnormal) Collected:  02/24/18 2019    Lab Status:  Final result Specimen:  Urine from Urine, Clean Catch Updated:  02/24/18 2036     RBC, UA 4-10 (A) /hpf      WBC, UA 0-1 (A) /hpf      Epithelial Cells Occasional /hpf      Bacteria, UA Occasional /hpf     UA w Reflex to Microscopic [[id number]]  (Abnormal) Collected:  02/24/18 2019    Lab Status:  Final result Specimen:  Urine from Urine, Clean Catch Updated:  02/24/18 2028     Color, UA Yellow     Clarity, UA Clear     Specific Gravity, UA 1 025     pH, UA 5 5     Leukocytes, UA Negative     Nitrite, UA Negative     Protein, UA Negative mg/dl      Glucose, UA Negative mg/dl      Ketones, UA Negative mg/dl      Urobilinogen, UA 0 2 E U /dl      Bilirubin, UA Negative     Blood, UA Moderate (A)    POCT pregnancy, urine [[id number]]  (Normal) Resulted:  02/24/18 2026    Lab Status:  Final result Updated:  02/24/18 2026     EXT PREG TEST UR (Ref: Negative) Negative                 CT renal stone study abdomen pelvis without contrast   Final Result by Sarthak Mooney MD (02/24 2050)      No renal calculi or obstructive uropathy  No acute abdominal or pelvic abnormality  Workstation performed: [id number]         US pelvis complete non OB    (Results Pending)              Procedures  Procedures       Phone Contacts  ED Phone Contact    ED Course  ED Course                                MDM  Number of Diagnoses or Management Options  Diagnosis management comments: Preg  Test negative, CT Evaluation negative, pt  Will need US to fully r/o ovarian cyst   Will tx pain and advise rest, fluids, heating pad, follow up outpt  CritCare Time    Disposition  Final diagnoses:   Abdominal pain     Time reflects when diagnosis was documented in both MDM as applicable and the Disposition within this note     Time User Action Codes Description Comment    8/01/1390  2:95 PM Zaida PIERSON Add [F81 4] Abdominal pain       ED Disposition     ED Disposition Condition Comment    Discharge  Dominick Found discharge to home/self care      Condition at discharge: Stable        Follow-up Information     Follow up With Specialties Details Why [address], DO Family Medicine Schedule an appointment as soon as possible for a visit  [facility] [place] Micky Baldwin  [phone]          Patient's Medications   Discharge Prescriptions    NAPROXEN (NAPROSYN) 500 MG TABLET    Take 1 tablet (500 mg total) by mouth 2 (two) times a day with meals       Start Date: 2/24/2018 End Date: --       Order Dose: 500 mg       Quantity: 10 tablet    Refills: 0    TRAMADOL (ULTRAM) 50 MG TABLET    1 q 6 hours prn severe pain       Start Date: 2/24/2018 End Date: --       Order Dose: --       Quantity: 6 tablet    Refills: 0       Outpatient Discharge Orders  US pelvis complete non OB   Standing Status: Future  Standing Exp   Date: 02/24/22         ED Provider  Electronically Signed by           Marci Rivas MD  72/74/56 0114

## 2018-04-27 ENCOUNTER — TRANSCRIBE ORDERS (OUTPATIENT)
Dept: ADMINISTRATIVE | Facility: HOSPITAL | Age: 23
End: 2018-04-27

## 2018-04-27 DIAGNOSIS — N97.9 PRIMARY FEMALE INFERTILITY: Primary | ICD-10-CM

## 2018-04-28 ENCOUNTER — HOSPITAL ENCOUNTER (OUTPATIENT)
Dept: RADIOLOGY | Facility: HOSPITAL | Age: 23
Discharge: HOME | End: 2018-04-28
Payer: COMMERCIAL

## 2018-04-28 DIAGNOSIS — N97.9 PRIMARY FEMALE INFERTILITY: ICD-10-CM

## 2018-04-28 PROCEDURE — 76856 US EXAM PELVIC COMPLETE: CPT

## 2018-04-28 PROCEDURE — 76830 TRANSVAGINAL US NON-OB: CPT

## 2018-05-23 ENCOUNTER — OFFICE VISIT (OUTPATIENT)
Dept: FAMILY MEDICINE CLINIC | Facility: CLINIC | Age: 23
End: 2018-05-23
Payer: COMMERCIAL

## 2018-05-23 VITALS
BODY MASS INDEX: 27.6 KG/M2 | HEIGHT: 62 IN | WEIGHT: 150 LBS | TEMPERATURE: 98.8 F | HEART RATE: 72 BPM | DIASTOLIC BLOOD PRESSURE: 62 MMHG | SYSTOLIC BLOOD PRESSURE: 100 MMHG | RESPIRATION RATE: 18 BRPM

## 2018-05-23 DIAGNOSIS — J30.1 NON-SEASONAL ALLERGIC RHINITIS DUE TO POLLEN: ICD-10-CM

## 2018-05-23 DIAGNOSIS — R53.83 OTHER FATIGUE: ICD-10-CM

## 2018-05-23 DIAGNOSIS — J06.9 ACUTE URI: Primary | ICD-10-CM

## 2018-05-23 DIAGNOSIS — E07.9 THYROID DISORDER: ICD-10-CM

## 2018-05-23 PROBLEM — J30.9 ALLERGIC RHINITIS: Status: ACTIVE | Noted: 2017-12-14

## 2018-05-23 PROBLEM — L30.9 DERMATITIS: Status: ACTIVE | Noted: 2017-01-24

## 2018-05-23 PROBLEM — H93.19 TINNITUS: Status: ACTIVE | Noted: 2017-10-09

## 2018-05-23 PROCEDURE — 99214 OFFICE O/P EST MOD 30 MIN: CPT | Performed by: FAMILY MEDICINE

## 2018-05-23 RX ORDER — FLUTICASONE PROPIONATE 50 MCG
2 SPRAY, SUSPENSION (ML) NASAL DAILY
COMMUNITY
Start: 2017-12-14 | End: 2018-07-03

## 2018-05-23 RX ORDER — LEVOCETIRIZINE DIHYDROCHLORIDE 5 MG/1
1 TABLET, FILM COATED ORAL DAILY
COMMUNITY
Start: 2017-12-14 | End: 2018-07-03

## 2018-05-23 RX ORDER — AZITHROMYCIN 250 MG/1
TABLET, FILM COATED ORAL
Qty: 6 TABLET | Refills: 0 | Status: SHIPPED | OUTPATIENT
Start: 2018-05-23 | End: 2018-05-28

## 2018-05-23 NOTE — PROGRESS NOTES
Assessment/Plan:    No problem-specific Assessment & Plan notes found for this encounter  Thyroid enlargement, check tsh/US, may need ENT eval next advised  Uri new  Fatigue w/u, tsh and cbc  Allergies stable     Diagnoses and all orders for this visit:    Acute URI  -     azithromycin (ZITHROMAX) 250 mg tablet; Take 500mg on day 1, 250mg on days 2-5    Thyroid disorder  -     T4, free; Future  -     TSH, 3rd generation; Future  -     US thyroid; Future    Non-seasonal allergic rhinitis due to pollen    Other fatigue  -     CBC and differential; Future    Other orders  -     fluticasone (FLONASE) 50 mcg/act nasal spray; 2 sprays into each nostril daily  -     levocetirizine (XYZAL) 5 MG tablet; Take 1 tablet by mouth daily  -     VENTOLIN  (90 Base) MCG/ACT inhaler; INHALE 2 PUFF(S) EVERY 4 HOURS BY INHALATION ROUTE AS NEEDED  add zpack if no better on amoxil in few days  otc      No Follow-up on file  Subjective:      Patient ID: Angelica Moran is a 24 y o  female      Chief Complaint   Patient presents with    Cold Like Symptoms     congestion    Fever     101  klm lpn       HPI  Fever,uri sx, planning conception  Sore throat  Runny nose  Irritated throat  Hx of thyroid disorder  [facility]  No sick contacts    Stopped the thyroid meds, was on levothyroxine  Had US, told it was fine, saw endocrinologist  Neck seems more enlarged in past months  No palpitations or probs with menses  Periods are not heavy, feels tired often  Has not had f/u  Mother feels her neck seems enlarging lately    Has Son 3yo  Fever 80  Son also with uri currently, no specific dx  She is currently on amoxil 500mg tid, from dentist, just started, tooth issue, day 1  The following portions of the patient's history were reviewed and updated as appropriate: allergies, current medications, past family history, past medical history, past social history, past surgical history and problem list     Review of Systems   HENT: Negative for ear pain  Respiratory: Negative for shortness of breath and stridor  some discomfort when she swallows, in the suprasternal notch area  Allergies stable    Current Outpatient Prescriptions   Medication Sig Dispense Refill    fluticasone (FLONASE) 50 mcg/act nasal spray 2 sprays into each nostril daily      levocetirizine (XYZAL) 5 MG tablet Take 1 tablet by mouth daily      naproxen (NAPROSYN) 500 mg tablet Take 1 tablet (500 mg total) by mouth 2 (two) times a day with meals 10 tablet 0    Prenatal MV-Min-Fe Fum-FA-DHA (PRENATAL 1 PO) Take by mouth daily      traMADol (ULTRAM) 50 mg tablet 1 q 6 hours prn severe pain 6 tablet 0    VENTOLIN  (90 Base) MCG/ACT inhaler INHALE 2 PUFF(S) EVERY 4 HOURS BY INHALATION ROUTE AS NEEDED   0    azithromycin (ZITHROMAX) 250 mg tablet Take 500mg on day 1, 250mg on days 2-5 6 tablet 0     No current facility-administered medications for this visit  Objective:    /62   Pulse 72   Temp 98 8 °F (37 1 °C)   Resp 18   Ht 5' 2" (1 575 m)   Wt 68 kg (150 lb)   BMI 27 44 kg/m²        Physical Exam   Constitutional: She appears well-developed  HENT:   Head: Normocephalic  Eyes: Conjunctivae are normal  Right eye exhibits no discharge  Left eye exhibits no discharge  Neck: Neck supple  No tracheal deviation present  Thyromegaly present  Cardiovascular: Normal rate and intact distal pulses  Pulmonary/Chest: Effort normal  No respiratory distress  Abdominal: Soft  Musculoskeletal: She exhibits no edema or deformity  Lymphadenopathy:     She has no cervical adenopathy  Neurological: She is alert  Skin: Skin is warm and dry  Psychiatric: Her behavior is normal  Thought content normal    Nursing note and vitals reviewed       Nasal candido b/l  Tonsils absent  Coarse cough       Srikanth Sprung, DO

## 2018-05-23 NOTE — PATIENT INSTRUCTIONS
Add on the zithromax antibiotic (to the amoxicillin) if your symptoms and mucus do not improve in the next 4-5days

## 2018-06-08 ENCOUNTER — HOSPITAL ENCOUNTER (OUTPATIENT)
Dept: RADIOLOGY | Facility: HOSPITAL | Age: 23
Discharge: HOME | End: 2018-06-08
Attending: FAMILY MEDICINE

## 2018-06-08 DIAGNOSIS — E07.9 THYROID DISORDER: ICD-10-CM

## 2018-06-16 ENCOUNTER — TELEPHONE (OUTPATIENT)
Dept: FAMILY MEDICINE CLINIC | Facility: CLINIC | Age: 23
End: 2018-06-16

## 2018-06-16 NOTE — TELEPHONE ENCOUNTER
Pt calling for results of u/s she had 6/8  Still not in system, can we please call radiology Monday and check on this

## 2018-06-18 DIAGNOSIS — E04.1 LEFT THYROID NODULE: Primary | ICD-10-CM

## 2018-07-03 ENCOUNTER — HOSPITAL ENCOUNTER (EMERGENCY)
Facility: HOSPITAL | Age: 23
Discharge: HOME | End: 2018-07-04
Attending: EMERGENCY MEDICINE
Payer: COMMERCIAL

## 2018-07-03 ENCOUNTER — APPOINTMENT (EMERGENCY)
Dept: RADIOLOGY | Facility: HOSPITAL | Age: 23
End: 2018-07-03
Payer: COMMERCIAL

## 2018-07-03 VITALS
TEMPERATURE: 98.6 F | HEART RATE: 73 BPM | SYSTOLIC BLOOD PRESSURE: 110 MMHG | RESPIRATION RATE: 16 BRPM | OXYGEN SATURATION: 97 % | BODY MASS INDEX: 27.44 KG/M2 | WEIGHT: 150 LBS | DIASTOLIC BLOOD PRESSURE: 63 MMHG

## 2018-07-03 DIAGNOSIS — S16.1XXA CERVICAL STRAIN: Primary | ICD-10-CM

## 2018-07-03 DIAGNOSIS — M79.18 MUSCULOSKELETAL PAIN: ICD-10-CM

## 2018-07-03 PROCEDURE — 72125 CT NECK SPINE W/O DYE: CPT

## 2018-07-03 RX ORDER — CYCLOBENZAPRINE HCL 10 MG
10 TABLET ORAL 3 TIMES DAILY PRN
Qty: 15 TABLET | Refills: 0 | Status: SHIPPED | OUTPATIENT
Start: 2018-07-03 | End: 2019-01-17

## 2018-07-03 RX ORDER — PREDNISONE 20 MG/1
60 TABLET ORAL ONCE
Status: COMPLETED | OUTPATIENT
Start: 2018-07-03 | End: 2018-07-03

## 2018-07-03 RX ORDER — CYCLOBENZAPRINE HCL 10 MG
10 TABLET ORAL ONCE
Status: COMPLETED | OUTPATIENT
Start: 2018-07-03 | End: 2018-07-03

## 2018-07-03 RX ORDER — METHYLPREDNISOLONE 4 MG/1
TABLET ORAL
Qty: 21 TABLET | Refills: 0 | Status: SHIPPED | OUTPATIENT
Start: 2018-07-03 | End: 2019-01-17

## 2018-07-03 RX ORDER — LIDOCAINE 50 MG/G
1 PATCH TOPICAL ONCE
Status: DISCONTINUED | OUTPATIENT
Start: 2018-07-03 | End: 2018-07-04 | Stop reason: HOSPADM

## 2018-07-03 RX ORDER — TRAMADOL HYDROCHLORIDE 50 MG/1
50 TABLET ORAL EVERY 6 HOURS PRN
Qty: 20 TABLET | Refills: 0 | Status: SHIPPED | OUTPATIENT
Start: 2018-07-03 | End: 2019-01-17

## 2018-07-03 RX ADMIN — CYCLOBENZAPRINE HYDROCHLORIDE 10 MG: 10 TABLET, FILM COATED ORAL at 22:59

## 2018-07-03 RX ADMIN — PREDNISONE 60 MG: 20 TABLET ORAL at 22:58

## 2018-07-03 RX ADMIN — LIDOCAINE 1 PATCH: 50 PATCH CUTANEOUS at 22:58

## 2018-07-04 PROCEDURE — 99283 EMERGENCY DEPT VISIT LOW MDM: CPT

## 2018-07-04 NOTE — DISCHARGE INSTRUCTIONS
Cervical Strain   WHAT YOU NEED TO KNOW:   A cervical strain is a stretched or torn muscle or tendon in your neck  Tendons are strong tissues that connect muscles to bones  Common causes of cervical strains include a car accident, a fall, or a sports injury  DISCHARGE INSTRUCTIONS:   Return to the emergency department if:   · You have pain or numbness from your shoulder down to your hand  · You have problems with your vision, hearing, or balance  · You feel confused or cannot concentrate  · You have problems with movement and strength  Contact your healthcare provider if:   · You have increased swelling or pain in your neck  · You have questions or concerns about your condition or care  Medicines: You may need any of the following:  · Acetaminophen  decreases pain and fever  It is available without a doctor's order  Ask how much to take and how often to take it  Follow directions  Read the labels of all other medicines you are using to see if they also contain acetaminophen, or ask your doctor or pharmacist  Acetaminophen can cause liver damage if not taken correctly  Do not use more than 4 grams (4,000 milligrams) total of acetaminophen in one day  · NSAIDs , such as ibuprofen, help decrease swelling, pain, and fever  This medicine is available with or without a doctor's order  NSAIDs can cause stomach bleeding or kidney problems in certain people  If you take blood thinner medicine, always ask your healthcare provider if NSAIDs are safe for you  Always read the medicine label and follow directions  · Muscle relaxers  help decrease pain and muscle spasms  · Prescription pain medicine  may be given  Ask your healthcare provider how to take this medicine safely  Some prescription pain medicines contain acetaminophen  Do not take other medicines that contain acetaminophen without talking to your healthcare provider  Too much acetaminophen may cause liver damage   Prescription pain medicine may cause constipation  Ask your healthcare provider how to prevent or treat constipation  · Take your medicine as directed  Contact your healthcare provider if you think your medicine is not helping or if you have side effects  Tell him or her if you are allergic to any medicine  Keep a list of the medicines, vitamins, and herbs you take  Include the amounts, and when and why you take them  Bring the list or the pill bottles to follow-up visits  Carry your medicine list with you in case of an emergency  Manage your symptoms:   · Apply heat  on your neck for 15 to 20 minutes, 4 to 6 times a day or as directed  Heat helps decrease pain, stiffness, and muscle spasms  · Begin gentle neck exercises  as soon as you can move your neck without pain  Exercises will help decrease stiffness and improve the strength and movement of your neck  Ask your healthcare provider what kind of exercises you should do  · Gradually return to your usual activities as directed  Stop if you have pain  Avoid activities that can cause more damage to your neck, such as heavy lifting or strenuous exercise  · Sleep without a pillow  to help decrease pain  Instead, roll a small towel tightly and place it under your neck  · Go to physical therapy as directed  A physical therapist teaches you exercises to help improve movement and strength, and to decrease pain  Prevent neck injury:   · Drive safely  Make sure everyone in your car wears a seatbelt  A seatbelt can save your life if you are in an accident  Do not use your cell phone when you are driving  This could distract you and cause an accident  Pull over if you need to make a call or send a text message  · Wear helmets, lifejackets, and protective gear  Always wear a helmet when you ride a bike or motorcycle, go skiing, or play sports that could cause a head injury  Wear protective equipment when you play sports   Wear a lifejacket when you are on a boat or doing water sports  Follow up with your healthcare provider as directed: You may be referred to an orthopedist or physical therapies  Write down your questions so you remember to ask them during your visits  © 2017 [facility] Information is for End User's use only and may not be sold, redistributed or otherwise used for commercial purposes  All illustrations and images included in CareNotes® are the copyrighted property of A D [employer] M [employer] Inc  or [employer]  The above information is an  only  It is not intended as medical advice for individual conditions or treatments  Talk to your doctor, nurse or pharmacist before following any medical regimen to see if it is safe and effective for you  Muscle Strain   WHAT YOU NEED TO KNOW:   A muscle strain is a twist, pull, or tear of a muscle or tendon  A tendon is a strong elastic tissue that connects a muscle to a bone  Signs of a strained muscle include bruising and swelling over the area, pain with movement, and loss of strength  DISCHARGE INSTRUCTIONS:   Return to the emergency department if:   · You suddenly cannot feel or move your injured muscle  Contact your healthcare provider if:   · Your pain and swelling worsen or do not go away  · You have questions or concerns about your condition or care  Medicines:   · NSAIDs  help decrease swelling and pain or fever  This medicine is available with or without a doctor's order  NSAIDs can cause stomach bleeding or kidney problems in certain people  If you take blood thinner medicine, always ask your healthcare provider if NSAIDs are safe for you  Always read the medicine label and follow directions  · Muscle relaxers  help decrease pain and muscle spasms  · Take your medicine as directed  Contact your healthcare provider if you think your medicine is not helping or if you have side effects  Tell him of her if you are allergic to any medicine   Keep a list of the medicines, vitamins, and herbs you take  Include the amounts, and when and why you take them  Bring the list or the pill bottles to follow-up visits  Carry your medicine list with you in case of an emergency  Follow up with your healthcare provider as directed: Your healthcare provider may suggest that you have a follow-up visit before you go back to your usual activity  Write down your questions so you remember to ask them during your visits  Self-care:   · 3 to 7 days after the injury:  Use Rest, Ice, Compression, and Elevation (RICE) to help stop bruising and decrease pain and swelling  ¨ Rest:  Rest your muscle to allow your injury to heal  When the pain decreases, begin normal, slow movements  For mild and moderate muscle strains, you should rest your muscles for about 2 days  However, if you have a severe muscle strain, you should rest for 10 to 14 days  You may need to use crutches to walk if your muscle strain is in your legs or lower body  ¨ Ice:  Put an ice pack on the injured area  Put a towel between the ice pack and your skin  Do not put the ice pack directly on your skin  You can use a package of frozen peas instead of an ice pack  ¨ Compression:  You may need to wrap an elastic bandage around the area to decrease swelling  It should be tight enough for you to feel support  Do not wrap it too tightly  ¨ Elevation:  Keep the injured muscle raised above your heart if possible  For example if you have a strain of your lower leg muscle, lie down and prop your leg up on pillows  This helps decrease pain and swelling  · 3 to 21 days after the injury:  Start to slowly and regularly exercise your muscle  This will help it heal  If you feel pain, decrease how hard you are exercising  · 1 to 6 weeks after the injury:  Stretch the injured muscle  Hold the stretch for about 30 seconds  Do this 4 times a day  You may stretch the muscle until you feel a slight pull   Stop stretching if you feel pain  · 2 weeks to 6 months after the injury:  The goal of this phase is to return to the activity you were doing before the injury happened, without hurting the muscle again  · 3 weeks to 6 months after the injury:  Keep stretching and strengthening your muscles to avoid injury  Slowly increase the time and distance that you exercise  You may have signs and symptoms of muscle strain 6 months after the injury, even if you do things to help it heal  In this case, you may need surgery on the muscle  © 2017 [employer]  Information is for End User's use only and may not be sold, redistributed or otherwise used for commercial purposes  All illustrations and images included in CareNotes® are the copyrighted property of A [employer] M [employer] Inc  or [employer]  The above information is an  only  It is not intended as medical advice for individual conditions or treatments  Talk to your doctor, nurse or pharmacist before following any medical regimen to see if it is safe and effective for you  Musculoskeletal Pain   WHAT YOU NEED TO KNOW:   Muscle pain can be dull, achy, or sharp  You may have pain and tenderness to the touch as well  It can occur anywhere on your body and is often brought on by exercise  Muscle pain may occur from an injury, such as a sprain, tendonitis, or bone fracture  Muscle pain can also be the result of medical conditions, such as polymyositis, fibromyalgia, and connective tissue disorders  DISCHARGE INSTRUCTIONS:   Self care:   · Rest  as directed and avoid activity that causes pain  You may be able to return to normal activity when you can move without pain  Follow directions for rest and activity  You are at risk for injury for 3 weeks after your symptoms go away  · Ice  your painful muscle area to decrease pain and swelling  Use an ice pack, or put ice in a plastic bag and cover it with a towel   Always  put a cloth between the ice and your skin  Apply the ice as often as directed for the first 24 to 48 hours  · Compression  with a splint, brace, or elastic bandage helps decrease pain and swelling  This may be needed for muscle pain in arms or legs  A splint, brace, or bandage will also help protect the painful area when you move around  · Elevate  a painful arm or leg to reduce swelling and pain  Elevate your limb while you are sitting or lying down  Prop a painful leg on pillows to keep it above the level of your heart  Medicines:   · NSAIDs  help decrease swelling and pain or fever  This medicine is available with or without a doctor's order  NSAIDs can cause stomach bleeding or kidney problems in certain people  If you take blood thinner medicine, always ask your healthcare provider if NSAIDs are safe for you  Always read the medicine label and follow directions  · Acetaminophen  is used to decrease pain  It is available without a doctor's order  Ask your healthcare provider how much to take and when to take it  Follow directions  Acetaminophen can cause liver damage if not taken correctly  Do not take more than one medicine that contains acetaminophen unless directed  · Muscle relaxers  help relax your muscles to decrease pain and muscle spasms  · Steroids  may be given to decrease redness, pain, and swelling  · Take your medicine as directed  Contact your healthcare provider if you think your medicine is not helping or if you have side effects  Tell him if you are allergic to any medicine  Keep a list of the medicines, vitamins, and herbs you take  Include the amounts, and when and why you take them  Bring the list or the pill bottles to follow-up visits  Carry your medicine list with you in case of an emergency  Follow up with your healthcare provider as directed: You may need more tests to help healthcare providers find the cause of your muscle pain   You may need physical therapy to learn muscle strengthening exercises  Write down your questions so you remember to ask them during your visits  Contact your healthcare provider if:   · You have a fever  · You have trouble sleeping because of your pain  · Your painful area becomes more tender, red, and warm to the touch  · You have decreased movement of the painful area  · You have questions or concerns about your condition or care  Return to the emergency department if:   · You have increased severe pain when you move the painful muscle area  · You lose feeling in your painful muscle area  · You have new or worse swelling in the painful area  Your skin may feel tight  · You have increased muscle pain or pain that does not improve with treatment  © 2017 [facility] Information is for End User's use only and may not be sold, redistributed or otherwise used for commercial purposes  All illustrations and images included in CareNotes® are the copyrighted property of [employer]  or [employer]  The above information is an  only  It is not intended as medical advice for individual conditions or treatments  Talk to your doctor, nurse or pharmacist before following any medical regimen to see if it is safe and effective for you

## 2018-07-04 NOTE — ED PROVIDER NOTES
History  Chief Complaint   Patient presents with   Karla Boyd     tripped on toy in kitchen this afternoon and fell onto side    Back Pain     pain between shouderblades , neck brace applied in triage     67-year-old female presents with complaints of right-sided neck and upper back pain  Patient states she tripped over 20 the kitchen and fell onto her right side  Patient has had pain and now some swelling in the area with tenderness to palpation  No motor or sensory deficits, however it is painful to move the right upper extremity  No obvious deformity  Patient placed in a collar here in the ED  Patient denies any prior back injury        History provided by:  Patient and significant other  Fall   Mechanism of injury: fall    Injury location:  Shoulder/arm  Shoulder/arm injury location:  R shoulder  Incident location:  Kitchen  Time since incident:  5 hours  Arrived directly from scene: no    Fall:     Fall occurred:  Walking and tripped    Impact surface:  Hard floor    Point of impact: Right side of body  Suspicion of alcohol use: no    Suspicion of drug use: no    Prior to arrival data:     Patient ambulatory at scene: yes      Blood loss:  None    Responsiveness at scene:  Alert  Associated symptoms: back pain and neck pain    Associated symptoms: no abdominal pain, no difficulty breathing, no headaches, no loss of consciousness, no nausea and no vomiting    Back Pain   Associated symptoms: no abdominal pain and no headaches        None       History reviewed  No pertinent past medical history  Past Surgical History:   Procedure Laterality Date     SECTION         History reviewed  No pertinent family history  I have reviewed and agree with the history as documented  Social History   Substance Use Topics    Smoking status: Never Smoker    Smokeless tobacco: Never Used    Alcohol use No        Review of Systems   Constitutional: Negative  HENT: Negative  Respiratory: Negative  Cardiovascular: Negative  Gastrointestinal: Negative for abdominal pain, nausea and vomiting  Genitourinary: Negative  Musculoskeletal: Positive for back pain and neck pain  Negative for gait problem  Skin: Negative  Neurological: Negative for loss of consciousness and headaches  Hematological: Negative  Psychiatric/Behavioral: Negative  All other systems reviewed and are negative  Physical Exam  Physical Exam   Constitutional: She is oriented to person, place, and time  She appears well-developed and well-nourished  HENT:   Head: Normocephalic and atraumatic  Right Ear: External ear normal    Left Ear: External ear normal    Nose: Nose normal    Mouth/Throat: Oropharynx is clear and moist    Eyes: Conjunctivae and EOM are normal    Neck: Muscular tenderness present  Edema and decreased range of motion present  No thyromegaly present  Cardiovascular: Normal rate, regular rhythm, normal heart sounds and intact distal pulses  Pulmonary/Chest: Effort normal and breath sounds normal  No stridor  Abdominal: Soft  Bowel sounds are normal    Neurological: She is alert and oriented to person, place, and time  Skin: Skin is warm and dry  Capillary refill takes less than 2 seconds  Psychiatric: She has a normal mood and affect  Her behavior is normal  Judgment and thought content normal    Nursing note and vitals reviewed        Vital Signs  ED Triage Vitals [07/03/18 2149]   Temperature Pulse Respirations Blood Pressure SpO2   98 6 °F (37 °C) 73 16 110/63 97 %      Temp Source Heart Rate Source Patient Position - Orthostatic VS BP Location FiO2 (%)   Tympanic Monitor Sitting Left arm --      Pain Score       Worst Possible Pain           Vitals:    07/03/18 2149   BP: 110/63   Pulse: 73   Patient Position - Orthostatic VS: Sitting       Visual Acuity      ED Medications  Medications   lidocaine (LIDODERM) 5 % patch 1 patch (1 patch Transdermal Medication Applied 7/3/18 8306) cyclobenzaprine (FLEXERIL) tablet 10 mg (10 mg Oral Given 7/3/18 3486)   predniSONE tablet 60 mg (60 mg Oral Given 7/3/18 9693)       Diagnostic Studies  Results Reviewed     None                 CT cervical spine without contrast   Final Result by Shellie Amor MD (07/03 3551)      No cervical spine fracture or traumatic malalignment  Workstation performed: [id number]                    Procedures  Procedures       Phone Contacts  ED Phone Contact    ED Course                               MDM  CritCare Time    Disposition  Final diagnoses:   Cervical strain   Musculoskeletal pain - Right upper back     Time reflects when diagnosis was documented in both MDM as applicable and the Disposition within this note     Time User Action Codes Description Comment    7/3/2018 11:09 PM Matthews Pelt Add [S16  1XXA] Cervical strain     7/3/2018 11:11 PM Matthews Pelt Add [M79 1] Musculoskeletal pain     7/3/2018 11:11 PM Matthews Pelt Modify [M79 1] Musculoskeletal pain Right upper back      ED Disposition     ED Disposition Condition Comment    Discharge  [place] [facility] discharge to home/self care      Condition at discharge: Stable        Follow-up Information     Follow up With Specialties Details Why Contact Odalis Valerio DO Family Medicine Schedule an appointment as soon as possible for a visit in 1 week  [address] [place] [address]  [phone]            Patient's Medications   Discharge Prescriptions    CYCLOBENZAPRINE (FLEXERIL) 10 MG TABLET    Take 1 tablet (10 mg total) by mouth 3 (three) times a day as needed for muscle spasms for up to 15 doses       Start Date: 7/3/2018  End Date: --       Order Dose: 10 mg       Quantity: 15 tablet    Refills: 0    METHYLPREDNISOLONE 4 MG TBPK    Use as directed on package       Start Date: 7/3/2018  End Date: --       Order Dose: --       Quantity: 21 tablet    Refills: 0    TRAMADOL (ULTRAM) 50 MG TABLET    Take 1 tablet (50 mg total) by mouth every 6 (six) hours as needed for moderate pain for up to 20 doses       Start Date: 7/3/2018  End Date: --       Order Dose: 50 mg       Quantity: 20 tablet    Refills: 0     No discharge procedures on file      ED Provider  Electronically Signed by           Leticia Boo MD  07/04/18 3025

## 2018-07-05 ENCOUNTER — VBI (OUTPATIENT)
Dept: ADMINISTRATIVE | Facility: OTHER | Age: 23
End: 2018-07-05

## 2018-07-05 NOTE — TELEPHONE ENCOUNTER
I DANETTE for patient to call; she was instructed by ED to schedule an ED follow up in one week  ED visit documented in ED log

## 2018-09-06 ENCOUNTER — APPOINTMENT (EMERGENCY)
Dept: RADIOLOGY | Facility: HOSPITAL | Age: 23
End: 2018-09-06
Payer: COMMERCIAL

## 2018-09-06 ENCOUNTER — HOSPITAL ENCOUNTER (EMERGENCY)
Facility: HOSPITAL | Age: 23
Discharge: HOME | End: 2018-09-06
Attending: EMERGENCY MEDICINE | Admitting: EMERGENCY MEDICINE
Payer: COMMERCIAL

## 2018-09-06 VITALS
HEART RATE: 86 BPM | BODY MASS INDEX: 27.58 KG/M2 | DIASTOLIC BLOOD PRESSURE: 66 MMHG | OXYGEN SATURATION: 97 % | RESPIRATION RATE: 18 BRPM | SYSTOLIC BLOOD PRESSURE: 108 MMHG | WEIGHT: 150.8 LBS | TEMPERATURE: 98.8 F

## 2018-09-06 DIAGNOSIS — R10.9 ABDOMINAL PAIN: Primary | ICD-10-CM

## 2018-09-06 LAB
ALBUMIN SERPL BCP-MCNC: 3.6 G/DL (ref 3.5–5)
ALP SERPL-CCNC: 58 U/L (ref 46–116)
ALT SERPL W P-5'-P-CCNC: 23 U/L (ref 12–78)
ANION GAP SERPL CALCULATED.3IONS-SCNC: 6 MMOL/L (ref 4–13)
AST SERPL W P-5'-P-CCNC: 18 U/L (ref 5–45)
BASOPHILS # BLD AUTO: 0.04 THOUSANDS/ΜL (ref 0–0.1)
BASOPHILS NFR BLD AUTO: 1 % (ref 0–1)
BILIRUB SERPL-MCNC: 0.2 MG/DL (ref 0.2–1)
BILIRUB UR QL STRIP: NEGATIVE
BUN SERPL-MCNC: 12 MG/DL (ref 5–25)
CALCIUM SERPL-MCNC: 9 MG/DL (ref 8.3–10.1)
CHLORIDE SERPL-SCNC: 102 MMOL/L (ref 100–108)
CLARITY UR: CLEAR
CO2 SERPL-SCNC: 30 MMOL/L (ref 21–32)
COLOR UR: YELLOW
CREAT SERPL-MCNC: 0.86 MG/DL (ref 0.6–1.3)
EOSINOPHIL # BLD AUTO: 0.11 THOUSAND/ΜL (ref 0–0.61)
EOSINOPHIL NFR BLD AUTO: 1 % (ref 0–6)
ERYTHROCYTE [DISTWIDTH] IN BLOOD BY AUTOMATED COUNT: 12.4 % (ref 11.6–15.1)
EXT PREG TEST URINE: NEGATIVE
GFR SERPL CREATININE-BSD FRML MDRD: 95 ML/MIN/1.73SQ M
GLUCOSE SERPL-MCNC: 91 MG/DL (ref 65–140)
GLUCOSE UR STRIP-MCNC: NEGATIVE MG/DL
HCT VFR BLD AUTO: 37.4 % (ref 34.8–46.1)
HGB BLD-MCNC: 12.6 G/DL (ref 11.5–15.4)
HGB UR QL STRIP.AUTO: NEGATIVE
IMM GRANULOCYTES # BLD AUTO: 0.04 THOUSAND/UL (ref 0–0.2)
IMM GRANULOCYTES NFR BLD AUTO: 1 % (ref 0–2)
KETONES UR STRIP-MCNC: ABNORMAL MG/DL
LEUKOCYTE ESTERASE UR QL STRIP: NEGATIVE
LYMPHOCYTES # BLD AUTO: 3.08 THOUSANDS/ΜL (ref 0.6–4.47)
LYMPHOCYTES NFR BLD AUTO: 36 % (ref 14–44)
MCH RBC QN AUTO: 30 PG (ref 26.8–34.3)
MCHC RBC AUTO-ENTMCNC: 33.7 G/DL (ref 31.4–37.4)
MCV RBC AUTO: 89 FL (ref 82–98)
MONOCYTES # BLD AUTO: 0.61 THOUSAND/ΜL (ref 0.17–1.22)
MONOCYTES NFR BLD AUTO: 7 % (ref 4–12)
NEUTROPHILS # BLD AUTO: 4.61 THOUSANDS/ΜL (ref 1.85–7.62)
NEUTS SEG NFR BLD AUTO: 54 % (ref 43–75)
NITRITE UR QL STRIP: NEGATIVE
NRBC BLD AUTO-RTO: 0 /100 WBCS
PH UR STRIP.AUTO: 6.5 [PH] (ref 5–9)
PLATELET # BLD AUTO: 261 THOUSANDS/UL (ref 149–390)
PMV BLD AUTO: 10.7 FL (ref 8.9–12.7)
POTASSIUM SERPL-SCNC: 3.7 MMOL/L (ref 3.5–5.3)
PROT SERPL-MCNC: 7.3 G/DL (ref 6.4–8.2)
PROT UR STRIP-MCNC: NEGATIVE MG/DL
RBC # BLD AUTO: 4.2 MILLION/UL (ref 3.81–5.12)
SODIUM SERPL-SCNC: 138 MMOL/L (ref 136–145)
SP GR UR STRIP.AUTO: 1.02 (ref 1–1.03)
UROBILINOGEN UR QL STRIP.AUTO: 1 E.U./DL
WBC # BLD AUTO: 8.49 THOUSAND/UL (ref 4.31–10.16)

## 2018-09-06 PROCEDURE — 81025 URINE PREGNANCY TEST: CPT | Performed by: EMERGENCY MEDICINE

## 2018-09-06 PROCEDURE — 99284 EMERGENCY DEPT VISIT MOD MDM: CPT

## 2018-09-06 PROCEDURE — 85025 COMPLETE CBC W/AUTO DIFF WBC: CPT | Performed by: EMERGENCY MEDICINE

## 2018-09-06 PROCEDURE — 96374 THER/PROPH/DIAG INJ IV PUSH: CPT

## 2018-09-06 PROCEDURE — 36415 COLL VENOUS BLD VENIPUNCTURE: CPT | Performed by: EMERGENCY MEDICINE

## 2018-09-06 PROCEDURE — 80053 COMPREHEN METABOLIC PANEL: CPT | Performed by: EMERGENCY MEDICINE

## 2018-09-06 PROCEDURE — 81003 URINALYSIS AUTO W/O SCOPE: CPT | Performed by: EMERGENCY MEDICINE

## 2018-09-06 PROCEDURE — 96361 HYDRATE IV INFUSION ADD-ON: CPT

## 2018-09-06 PROCEDURE — 74022 RADEX COMPL AQT ABD SERIES: CPT

## 2018-09-06 RX ORDER — DICYCLOMINE HCL 20 MG
20 TABLET ORAL EVERY 6 HOURS PRN
Qty: 12 TABLET | Refills: 0 | Status: SHIPPED | OUTPATIENT
Start: 2018-09-06 | End: 2019-01-17

## 2018-09-06 RX ORDER — KETOROLAC TROMETHAMINE 30 MG/ML
30 INJECTION, SOLUTION INTRAMUSCULAR; INTRAVENOUS ONCE
Status: COMPLETED | OUTPATIENT
Start: 2018-09-06 | End: 2018-09-06

## 2018-09-06 RX ADMIN — SODIUM CHLORIDE 1000 ML: 0.9 INJECTION, SOLUTION INTRAVENOUS at 20:25

## 2018-09-06 RX ADMIN — KETOROLAC TROMETHAMINE 30 MG: 30 INJECTION, SOLUTION INTRAMUSCULAR at 20:25

## 2018-09-07 ENCOUNTER — TELEPHONE (OUTPATIENT)
Dept: ADMINISTRATIVE | Facility: OTHER | Age: 23
End: 2018-09-07

## 2018-09-07 NOTE — DISCHARGE INSTRUCTIONS
Abdominal Pain, Ambulatory Care - your blood and urine tests and xrays are normal at this time  Try tylenol or advil, the prescribed medicine and a heating pad as needed for discomfort  You can follow up with your doctor and the specialist as needed  GENERAL INFORMATION:   Abdominal pain  can be dull, achy, or sharp  You may have pain in one area of your abdomen, or in your entire abdomen  Your pain may be caused by constipation, food sensitivity, nonspecific GI distress, irritable bowel, infection, or a blockage  Abdominal pain can also be caused by a hernia, appendicitis, or an ulcer  The cause of your abdominal pain may be unknown  Sometimes you need further testing beyond what we can do in the ER  Seek immediate care for the following symptoms:   · New chest pain or shortness of breath    · Pulsing pain in your upper abdomen or lower back that suddenly becomes constant    · Pain in the right lower abdominal area that worsens with movement    · Fever over 100 4°F (38°C) or shaking chills    · Vomiting and you cannot keep food or fluids down    · Pain does not improve or gets worse over the next 8 to 12 hours    · Blood in your vomit or bowel movements, or they look black and tarry    · Skin or the whites of your eyes turn yellow    · Large amount of vaginal bleeding that is not your monthly period  Treatment for abdominal pain  may include medicine to calm your stomach, prevent vomiting, or decrease pain  Follow up with your healthcare provider as directed:  Write down your questions so you remember to ask them during your visits  CARE AGREEMENT:   You have the right to help plan your care  Learn about your health condition and how it may be treated  Discuss treatment options with your caregivers to decide what care you want to receive  You always have the right to refuse treatment  The above information is an  only   It is not intended as medical advice for individual conditions or treatments  Talk to your doctor, nurse or pharmacist before following any medical regimen to see if it is safe and effective for you  © 2014 [address] is for End User's use only and may not be sold, redistributed or otherwise used for commercial purposes  All illustrations and images included in CareNotes® are the copyrighted property of A D A M , Inc  or Reinaldo Boone

## 2018-09-07 NOTE — TELEPHONE ENCOUNTER
LMOM for patient to call [facility]  Instructions were to follow up with [facility] Provider  She may want to schedule with [facility]  ED visit documented in ED log

## 2018-09-07 NOTE — ED PROVIDER NOTES
History  Chief Complaint   Patient presents with    Abdominal Pain     pt presents to the ed with left sided abdominal pain x 4hrs      23 yowf c/o left lower abdominal pain off and on since 4 am   Drank some tea earlier, felt better, slept and was ok until she ate diner a couple hours ago  Pain returned and is described as sharp and severe  Radiates to belly button  No urinary symptoms  No nausea, vomiting, diarrhea  No vaginal discharge or bleeding     No fever  Last BM today  Never had pain like this before  I did note she has been seen twice in last 2 years for left lower abdominal pain, had CT scans both times which did not show any cause of the pain but the CT 2 years ago did show tiny luteal ovarian cyst - she does not feel this is the same pain as previous  History provided by:  Patient   used: No    Abdominal Pain   Associated symptoms: no chest pain, no cough, no diarrhea, no dysuria, no fever, no nausea, no shortness of breath, no vaginal bleeding, no vaginal discharge and no vomiting        Prior to Admission Medications   Prescriptions Last Dose Informant Patient Reported? Taking? Methylprednisolone 4 MG TBPK   No No   Sig: Use as directed on package   cyclobenzaprine (FLEXERIL) 10 mg tablet   No No   Sig: Take 1 tablet (10 mg total) by mouth 3 (three) times a day as needed for muscle spasms for up to 15 doses   traMADol (ULTRAM) 50 mg tablet   No No   Sig: Take 1 tablet (50 mg total) by mouth every 6 (six) hours as needed for moderate pain for up to 20 doses      Facility-Administered Medications: None       History reviewed  No pertinent past medical history  Past Surgical History:   Procedure Laterality Date     SECTION         History reviewed  No pertinent family history  I have reviewed and agree with the history as documented      Social History   Substance Use Topics    Smoking status: Never Smoker    Smokeless tobacco: Never Used    Alcohol use No        Review of Systems   Constitutional: Negative  Negative for fever  HENT: Negative  Eyes: Negative  Respiratory: Negative  Negative for cough and shortness of breath  Cardiovascular: Negative  Negative for chest pain  Gastrointestinal: Positive for abdominal pain  Negative for diarrhea, nausea and vomiting  Genitourinary: Negative  Negative for dysuria, flank pain, vaginal bleeding and vaginal discharge  Musculoskeletal: Negative  Negative for back pain and myalgias  Skin: Negative  Negative for rash and wound  Neurological: Negative  Negative for dizziness and headaches  Hematological: Does not bruise/bleed easily  Psychiatric/Behavioral: Negative  All other systems reviewed and are negative  Physical Exam  Physical Exam   Constitutional: She appears well-developed and well-nourished  No distress  HENT:   Head: Normocephalic and atraumatic  Eyes: Conjunctivae are normal  Pupils are equal, round, and reactive to light  Neck: Normal range of motion  Neck supple  Cardiovascular: Normal rate, regular rhythm and normal heart sounds  No murmur heard  Pulmonary/Chest: Effort normal and breath sounds normal  No respiratory distress  Abdominal: Soft  Bowel sounds are normal  She exhibits no distension and no mass  There is tenderness  There is no rebound and no guarding  Very mild ttp suprapubic and left mid abdomen   Musculoskeletal: Normal range of motion  She exhibits no edema or deformity  Neurological: She is alert  No cranial nerve deficit  Skin: Skin is warm and dry  No rash noted  She is not diaphoretic  No pallor  Psychiatric: She has a normal mood and affect  Her behavior is normal    Nursing note and vitals reviewed        Vital Signs  ED Triage Vitals   Temperature Pulse Respirations Blood Pressure SpO2   09/06/18 2003 09/06/18 2003 09/06/18 2003 09/06/18 2003 09/06/18 2003   98 8 °F (37 1 °C) 86 18 108/66 97 %      Temp Source Heart Rate Source Patient Position - Orthostatic VS BP Location FiO2 (%)   09/06/18 2003 09/06/18 2003 -- -- --   Tympanic Monitor         Pain Score       09/06/18 2025       4           Vitals:    09/06/18 2003   BP: 108/66   Pulse: 86       Visual Acuity      ED Medications  Medications   sodium chloride 0 9 % bolus 1,000 mL (1,000 mL Intravenous New Bag 9/6/18 2025)   ketorolac (TORADOL) injection 30 mg (30 mg Intravenous Given 9/6/18 2025)       Diagnostic Studies  Results Reviewed     Procedure Component Value Units Date/Time    Comprehensive metabolic panel [[id number]] Collected:  09/06/18 2022    Lab Status:  Final result Specimen:  Blood from Arm, Left Updated:  09/06/18 2047     Sodium 138 mmol/L      Potassium 3 7 mmol/L      Chloride 102 mmol/L      CO2 30 mmol/L      ANION GAP 6 mmol/L      BUN 12 mg/dL      Creatinine 0 86 mg/dL      Glucose 91 mg/dL      Calcium 9 0 mg/dL      AST 18 U/L      ALT 23 U/L      Alkaline Phosphatase 58 U/L      Total Protein 7 3 g/dL      Albumin 3 6 g/dL      Total Bilirubin 0 20 mg/dL      eGFR 95 ml/min/1 73sq m     Narrative:         National Kidney Disease Education Program recommendations are as follows:  GFR calculation is accurate only with a steady state creatinine  Chronic Kidney disease less than 60 ml/min/1 73 sq  meters  Kidney failure less than 15 ml/min/1 73 sq  meters      UA w Reflex to Microscopic [[id number]]  (Abnormal) Collected:  09/06/18 2023    Lab Status:  Final result Specimen:  Urine from Urine, Clean Catch Updated:  09/06/18 2031     Color, UA Yellow     Clarity, UA Clear     Specific Gravity, UA 1 025     pH, UA 6 5     Leukocytes, UA Negative     Nitrite, UA Negative     Protein, UA Negative mg/dl      Glucose, UA Negative mg/dl      Ketones, UA Trace (A) mg/dl      Urobilinogen, UA 1 0 E U /dl      Bilirubin, UA Negative     Blood, UA Negative    CBC and differential [[id number]] Collected:  09/06/18 2022    Lab Status:  Final result Specimen:  Blood from Arm, Left Updated:  09/06/18 2031     WBC 8 49 Thousand/uL      RBC 4 20 Million/uL      Hemoglobin 12 6 g/dL      Hematocrit 37 4 %      MCV 89 fL      MCH 30 0 pg      MCHC 33 7 g/dL      RDW 12 4 %      MPV 10 7 fL      Platelets 977 Thousands/uL      nRBC 0 /100 WBCs      Neutrophils Relative 54 %      Immat GRANS % 1 %      Lymphocytes Relative 36 %      Monocytes Relative 7 %      Eosinophils Relative 1 %      Basophils Relative 1 %      Neutrophils Absolute 4 61 Thousands/µL      Immature Grans Absolute 0 04 Thousand/uL      Lymphocytes Absolute 3 08 Thousands/µL      Monocytes Absolute 0 61 Thousand/µL      Eosinophils Absolute 0 11 Thousand/µL      Basophils Absolute 0 04 Thousands/µL     POCT pregnancy, urine [[id number]]  (Normal) Resulted:  09/06/18 2024    Lab Status:  Final result Updated:  09/06/18 2024     EXT PREG TEST UR (Ref: Negative) negative                 XR abdomen obstruction series   ED Interpretation by Zenaida Snow MD (04/43 3304)   Nonspecific                 Procedures  Procedures       Phone Contacts  ED Phone Contact    ED Course                               MDM  Number of Diagnoses or Management Options  Abdominal pain:   Diagnosis management comments: Evaluation benign, suspect nonspecific GI distress or irritable bowel  Will discharge, advised Bentyl and tylenol and heating pad  Prn and follow up outpt  Pt  Feeling better in ER  CritCare Time    Disposition  Final diagnoses:   Abdominal pain     Time reflects when diagnosis was documented in both MDM as applicable and the Disposition within this note     Time User Action Codes Description Comment    1/8/8321  7:05 PM Too PIERSON Add [K29 6] Abdominal pain       ED Disposition     ED Disposition Condition Comment    Discharge  [place] discharge to home/self care      Condition at discharge: Stable        Follow-up Information     Follow up With Specialties Details Why Daniel Bartlett MD Gastroenterology Schedule an appointment as soon as possible for a visit As needed [address]  [phone]            Patient's Medications   Discharge Prescriptions    No medications on file     No discharge procedures on file      ED Provider  Electronically Signed by           Marjorie Serrano MD  08/12/84 9278

## 2018-10-06 ENCOUNTER — HOSPITAL ENCOUNTER (EMERGENCY)
Facility: HOSPITAL | Age: 23
Discharge: HOME | End: 2018-10-06
Attending: EMERGENCY MEDICINE | Admitting: EMERGENCY MEDICINE
Payer: COMMERCIAL

## 2018-10-06 ENCOUNTER — APPOINTMENT (EMERGENCY)
Dept: RADIOLOGY | Facility: HOSPITAL | Age: 23
End: 2018-10-06
Payer: COMMERCIAL

## 2018-10-06 VITALS
WEIGHT: 150 LBS | OXYGEN SATURATION: 99 % | RESPIRATION RATE: 18 BRPM | DIASTOLIC BLOOD PRESSURE: 68 MMHG | BODY MASS INDEX: 27.44 KG/M2 | SYSTOLIC BLOOD PRESSURE: 109 MMHG | TEMPERATURE: 98.2 F | HEART RATE: 57 BPM

## 2018-10-06 DIAGNOSIS — S30.0XXA CONTUSION OF BUTTOCK, INITIAL ENCOUNTER: Primary | ICD-10-CM

## 2018-10-06 PROCEDURE — 99283 EMERGENCY DEPT VISIT LOW MDM: CPT

## 2018-10-06 PROCEDURE — 72220 X-RAY EXAM SACRUM TAILBONE: CPT

## 2018-10-06 RX ORDER — IBUPROFEN 600 MG/1
600 TABLET ORAL ONCE
Status: COMPLETED | OUTPATIENT
Start: 2018-10-06 | End: 2018-10-06

## 2018-10-06 RX ORDER — ACETAMINOPHEN 325 MG/1
650 TABLET ORAL ONCE
Status: COMPLETED | OUTPATIENT
Start: 2018-10-06 | End: 2018-10-06

## 2018-10-06 RX ADMIN — IBUPROFEN 600 MG: 600 TABLET ORAL at 13:30

## 2018-10-06 RX ADMIN — ACETAMINOPHEN 650 MG: 325 TABLET ORAL at 13:30

## 2018-10-06 NOTE — ED PROVIDER NOTES
History  Chief Complaint   Patient presents with    Back Pain     fell againest  at 0530 this am left sided back pain, denies any blood in urine at this time     23 y/o female presents today complaining of left lower back pain after falling against the  this morning  Lost her balance and hit her left buttock  No other injury  Did not hit her head  No blood thinners  Also complains of feeling 'dizzy' when she ambulates  Did not take anything for pain  History provided by:  Patient  Back Pain   Location:  Gluteal region  Quality:  Aching  Radiates to:  Does not radiate  Pain severity:  Moderate  Onset quality:  Sudden  Timing:  Constant  Progression:  Unchanged  Chronicity:  New  Context: falling    Relieved by:  None tried  Worsened by:  Ambulation  Ineffective treatments:  None tried  Associated symptoms: weakness (generalized)    Associated symptoms: no abdominal pain, no bladder incontinence, no bowel incontinence, no fever, no headaches, no leg pain, no numbness, no paresthesias, no perianal numbness and no tingling    Risk factors: no hx of osteoporosis and no recent surgery        Prior to Admission Medications   Prescriptions Last Dose Informant Patient Reported? Taking? Methylprednisolone 4 MG TBPK   No No   Sig: Use as directed on package   cyclobenzaprine (FLEXERIL) 10 mg tablet   No No   Sig: Take 1 tablet (10 mg total) by mouth 3 (three) times a day as needed for muscle spasms for up to 15 doses   dicyclomine (BENTYL) 20 mg tablet   No No   Sig: Take 1 tablet (20 mg total) by mouth every 6 (six) hours as needed (abdominal pain)   traMADol (ULTRAM) 50 mg tablet   No No   Sig: Take 1 tablet (50 mg total) by mouth every 6 (six) hours as needed for moderate pain for up to 20 doses      Facility-Administered Medications: None       History reviewed  No pertinent past medical history      Past Surgical History:   Procedure Laterality Date     SECTION History reviewed  No pertinent family history  I have reviewed and agree with the history as documented  Social History   Substance Use Topics    Smoking status: Never Smoker    Smokeless tobacco: Never Used    Alcohol use No        Review of Systems   Constitutional: Positive for chills  Negative for fatigue and fever  Gastrointestinal: Negative for abdominal pain and bowel incontinence  Genitourinary: Negative for bladder incontinence and difficulty urinating  Musculoskeletal: Positive for back pain  Skin: Negative for pallor, rash and wound  Neurological: Positive for weakness (generalized)  Negative for tingling, numbness, headaches and paresthesias  Physical Exam  Physical Exam   Constitutional: She is oriented to person, place, and time  She appears well-developed and well-nourished  HENT:   Head: Normocephalic and atraumatic  Eyes: Pupils are equal, round, and reactive to light  EOM are normal    Musculoskeletal: Normal range of motion  She exhibits no edema or deformity  Lumbar back: She exhibits tenderness  She exhibits normal range of motion, no bony tenderness, no swelling, no edema, no deformity, no laceration, no pain and no spasm  Back:    Small 4cm ecchymotic area in the left upper buttock  Soft  Mild tenderness over left piriformis muscle  NV intact distally  Neurological: She is alert and oriented to person, place, and time  Patient moving all extremities equally, no focal neuro deficits noted  Skin: Skin is warm and dry  Capillary refill takes less than 2 seconds  No rash noted  No erythema  No pallor  Psychiatric: She has a normal mood and affect  Nursing note and vitals reviewed        Vital Signs  ED Triage Vitals   Temperature Pulse Respirations Blood Pressure SpO2   10/06/18 1212 10/06/18 1212 10/06/18 1212 10/06/18 1213 10/06/18 1213   98 2 °F (36 8 °C) 66 18 (!) 88/52 96 %      Temp Source Heart Rate Source Patient Position - Orthostatic VS BP Location FiO2 (%)   10/06/18 1212 10/06/18 1330 10/06/18 1330 10/06/18 1330 --   Oral Monitor Sitting Left arm       Pain Score       10/06/18 1212       8           Vitals:    10/06/18 1212 10/06/18 1213 10/06/18 1330   BP:  (!) 88/52 109/68   Pulse: 66  57   Patient Position - Orthostatic VS:   Sitting       Visual Acuity      ED Medications  Medications   acetaminophen (TYLENOL) tablet 650 mg (650 mg Oral Given 10/6/18 1330)   ibuprofen (MOTRIN) tablet 600 mg (600 mg Oral Given 10/6/18 1330)       Diagnostic Studies  Results Reviewed     None                 XR sacrum and coccyx    (Results Pending)              Procedures  Procedures       Phone Contacts  ED Phone Contact    ED Course                               MDM  Number of Diagnoses or Management Options  Contusion of buttock, initial encounter:   Diagnosis management comments: Xray negative by my read  Likely contusion  Able to ambulate  Was initially mildly hypotensive on arrival which has since resolved  Able to take PO without difficulty  Amount and/or Complexity of Data Reviewed  Tests in the radiology section of CPT®: ordered and reviewed  Independent visualization of images, tracings, or specimens: yes    Risk of Complications, Morbidity, and/or Mortality  Presenting problems: moderate  Diagnostic procedures: moderate  Management options: low    Patient Progress  Patient progress: stable    CritCare Time    Disposition  Final diagnoses:   Contusion of buttock, initial encounter     Time reflects when diagnosis was documented in both MDM as applicable and the Disposition within this note     Time User Action Codes Description Comment    10/6/2018  2:13 PM Roberto Palacios [S30  0XXA] Contusion of buttock, initial encounter       ED Disposition     ED Disposition Condition Comment    Discharge  [facility] discharge to home/self care      Condition at discharge: Stable        Follow-up Information     Follow up With Specialties Details Why Contact Info    Racquel Álvarez DO Family Medicine Schedule an appointment as soon as possible for a visit As needed [facility]  [phone]            Discharge Medication List as of 10/6/2018  2:13 PM      CONTINUE these medications which have NOT CHANGED    Details   cyclobenzaprine (FLEXERIL) 10 mg tablet Take 1 tablet (10 mg total) by mouth 3 (three) times a day as needed for muscle spasms for up to 15 doses, Starting Tue 7/3/2018, Print      dicyclomine (BENTYL) 20 mg tablet Take 1 tablet (20 mg total) by mouth every 6 (six) hours as needed (abdominal pain), Starting Thu 9/6/2018, Print      Methylprednisolone 4 MG TBPK Use as directed on package, Print      traMADol (ULTRAM) 50 mg tablet Take 1 tablet (50 mg total) by mouth every 6 (six) hours as needed for moderate pain for up to 20 doses, Starting Tue 7/3/2018, Print           No discharge procedures on file      ED Provider  Electronically Signed by           Thais Taveras DO  10/06/18 5945

## 2018-10-06 NOTE — DISCHARGE INSTRUCTIONS

## 2018-10-06 NOTE — ED NOTES
Patient encouraged to drink PO fluids now  Given warm blankets        Dandy Kitchen RN  10/06/18 6818

## 2018-12-06 ENCOUNTER — OUTPATIENT (OUTPATIENT)
Dept: OUTPATIENT SERVICES | Facility: HOSPITAL | Age: 23
LOS: 1 days | Discharge: HOME | End: 2018-12-06

## 2018-12-06 DIAGNOSIS — N97.9 FEMALE INFERTILITY, UNSPECIFIED: ICD-10-CM

## 2019-01-17 RX ORDER — FLUTICASONE PROPIONATE AND SALMETEROL 232; 14 UG/1; UG/1
1 POWDER, METERED RESPIRATORY (INHALATION) 2 TIMES DAILY
Refills: 0 | COMMUNITY
Start: 2018-12-24 | End: 2019-01-18 | Stop reason: ALTCHOICE

## 2019-01-18 ENCOUNTER — OFFICE VISIT (OUTPATIENT)
Dept: FAMILY MEDICINE CLINIC | Facility: CLINIC | Age: 24
End: 2019-01-18
Payer: COMMERCIAL

## 2019-01-18 VITALS
HEART RATE: 72 BPM | TEMPERATURE: 98.3 F | SYSTOLIC BLOOD PRESSURE: 108 MMHG | BODY MASS INDEX: 28.52 KG/M2 | HEIGHT: 62 IN | RESPIRATION RATE: 16 BRPM | DIASTOLIC BLOOD PRESSURE: 70 MMHG | WEIGHT: 155 LBS

## 2019-01-18 DIAGNOSIS — Z00.00 ANNUAL PHYSICAL EXAM: Primary | ICD-10-CM

## 2019-01-18 PROCEDURE — 99395 PREV VISIT EST AGE 18-39: CPT | Performed by: NURSE PRACTITIONER

## 2019-01-18 NOTE — PATIENT INSTRUCTIONS
Wellness Visit for Adults   WHAT YOU NEED TO KNOW:   What is a wellness visit? A wellness visit is when you see your healthcare provider to get screened for health problems  You can also get advice on how to stay healthy  Write down your questions so you remember to ask them  Ask your healthcare provider how often you should have a wellness visit  What happens at a wellness visit? Your healthcare provider will ask about your health, and your family history of health problems  This includes high blood pressure, heart disease, and cancer  He or she will ask if you have symptoms that concern you, if you smoke, and about your mood  You may also be asked about your intake of medicines, supplements, food, and alcohol  Any of the following may be done:  · Your weight  will be checked  Your height may also be checked so your body mass index (BMI) can be calculated  Your BMI shows if you are at a healthy weight  · Your blood pressure  and heart rate will be checked  Your temperature may also be checked  · Blood and urine tests  may be done  Blood tests may be done to check your cholesterol levels  Abnormal cholesterol levels increase your risk for heart disease and stroke  You may also need a blood or urine test to check for diabetes if you are at increased risk  Urine tests may be done to look for signs of an infection or kidney disease  · A physical exam  includes checking your heartbeat and lungs with a stethoscope  Your healthcare provider may also check your skin to look for sun damage  · Screening tests  may be recommended  A screening test is done to check for diseases that may not cause symptoms  The screening tests you may need depend on your age, gender, family history, and lifestyle habits  For example, colorectal screening may be recommended if you are 48years old or older  What screening tests do I need if I am a woman? · A Pap smear  is used to screen for cervical cancer   Pap smears are usually done every 3 to 5 years depending on your age  You may need them more often if you have had abnormal Pap smear test results in the past  Ask your healthcare provider how often you should have a Pap smear  · A mammogram  is an x-ray of your breasts to screen for breast cancer  Experts recommend mammograms every 2 years starting at age 48 years  You may need a mammogram at age 52 years or younger if you have an increased risk for breast cancer  Talk to your healthcare provider about when you should start having mammograms and how often you need them  What vaccines might I need? · Get an influenza vaccine  every year  The influenza vaccine protects you from the flu  Several types of viruses cause the flu  The viruses change over time, so new vaccines are made each year  · Get a tetanus-diphtheria (Td) booster vaccine  every 10 years  This vaccine protects you against tetanus and diphtheria  Tetanus is a severe infection that may cause painful muscle spasms and lockjaw  Diphtheria is a severe bacterial infection that causes a thick covering in the back of your mouth and throat  · Get a human papillomavirus (HPV) vaccine  if you are female and aged 23 to 32 or male 23 to 24 and never received it  This vaccine protects you from HPV infection  HPV is the most common infection spread by sexual contact  HPV may also cause vaginal, penile, and anal cancers  · Get a pneumococcal vaccine  if you are aged 72 years or older  The pneumococcal vaccine is an injection given to protect you from pneumococcal disease  Pneumococcal disease is an infection caused by pneumococcal bacteria  The infection may cause pneumonia, meningitis, or an ear infection  · Get a shingles vaccine  if you are aged 61 or older, even if you have had shingles before  The shingles vaccine is an injection to protect you from the varicella-zoster virus  This is the same virus that causes chickenpox   Shingles is a painful rash that develops in people who had chickenpox or have been exposed to the virus  How can I eat healthy? My Plate is a model for planning healthy meals  It shows the types and amounts of foods that should go on your plate  Fruits and vegetables make up about half of your plate, and grains and protein make up the other half  A serving of dairy is included on the side of your plate  The amount of calories and serving sizes you need depends on your age, gender, weight, and height  Examples of healthy foods are listed below:  · Eat a variety of vegetables  such as dark green, red, and orange vegetables  You can also include canned vegetables low in sodium (salt) and frozen vegetables without added butter or sauces  · Eat a variety of fresh fruits , canned fruit in 100% juice, frozen fruit, and dried fruit  · Include whole grains  At least half of the grains you eat should be whole grains  Examples include whole-wheat bread, wheat pasta, brown rice, and whole-grain cereals such as oatmeal     · Eat a variety of protein foods such as seafood (fish and shellfish), lean meat, and poultry without skin (turkey and chicken)  Examples of lean meats include pork leg, shoulder, or tenderloin, and beef round, sirloin, tenderloin, and extra lean ground beef  Other protein foods include eggs and egg substitutes, beans, peas, soy products, nuts, and seeds  · Choose low-fat dairy products such as skim or 1% milk or low-fat yogurt, cheese, and cottage cheese  · Limit unhealthy fats  such as butter, hard margarine, and shortening  How much exercise do I need? Exercise at least 30 minutes per day on most days of the week  Some examples of exercise include walking, biking, dancing, and swimming  You can also fit in more physical activity by taking the stairs instead of the elevator or parking farther away from stores  Include muscle strengthening activities 2 days each week  Regular exercise provides many health benefits  It helps you manage your weight, and decreases your risk for type 2 diabetes, heart disease, stroke, and high blood pressure  Exercise can also help improve your mood  Ask your healthcare provider about the best exercise plan for you  What are some general health and safety guidelines I should follow? · Do not smoke  Nicotine and other chemicals in cigarettes and cigars can cause lung damage  Ask your healthcare provider for information if you currently smoke and need help to quit  E-cigarettes or smokeless tobacco still contain nicotine  Talk to your healthcare provider before you use these products  · Limit alcohol  A drink of alcohol is 12 ounces of beer, 5 ounces of wine, or 1½ ounces of liquor  · Lose weight, if needed  Being overweight increases your risk of certain health conditions  These include heart disease, high blood pressure, type 2 diabetes, and certain types of cancer  · Protect your skin  Do not sunbathe or use tanning beds  Use sunscreen with a SPF 15 or higher  Apply sunscreen at least 15 minutes before you go outside  Reapply sunscreen every 2 hours  Wear protective clothing, hats, and sunglasses when you are outside  · Drive safely  Always wear your seatbelt  Make sure everyone in your car wears a seatbelt  A seatbelt can save your life if you are in an accident  Do not use your cell phone when you are driving  This could distract you and cause an accident  Pull over if you need to make a call or send a text message  · Practice safe sex  Use latex condoms if are sexually active and have more than one partner  Your healthcare provider may recommend screening tests for sexually transmitted infections (STIs)  · Wear helmets, lifejackets, and protective gear  Always wear a helmet when you ride a bike or motorcycle, go skiing, or play sports that could cause a head injury  Wear protective equipment when you play sports   Wear a lifejacket when you are on a boat or doing water sports  CARE AGREEMENT:   You have the right to help plan your care  Learn about your health condition and how it may be treated  Discuss treatment options with your caregivers to decide what care you want to receive  You always have the right to refuse treatment  The above information is an  only  It is not intended as medical advice for individual conditions or treatments  Talk to your doctor, nurse or pharmacist before following any medical regimen to see if it is safe and effective for you  © 2017 [employer] Information is for End User's use only and may not be sold, redistributed or otherwise used for commercial purposes  All illustrations and images included in CareNotes® are the copyrighted property of A D A M [employer] Inc  or [employer]

## 2019-01-18 NOTE — PROGRESS NOTES
FAMILY PRACTICE HEALTH MAINTENANCE OFFICE VISIT  [facility] - [address]    NAME: Blair Cordero  AGE: 24 y o  SEX: female  : 1995     DATE: 2019    Assessment and Plan              Diagnoses and all orders for this visit:    Annual physical exam    BMI 28 0-28 9,adult            · Patient Counseling:   · Nutrition: Stressed importance of a well balanced diet, moderation of sodium/saturated fat, caloric balance and sufficient intake of fiber  · Exercise: Stressed the importance of regular exercise with a goal of 150 minutes per week  · Dental Health: Discussed daily flossing and brushing and regular dental visits   · Sexuality: Discussed sexually transmitted infections, use of condoms and prevention of unintended pregnancy  · Alcohol Use:  Recommended moderation of alcohol intake  · Injury Prevention: Discussed Safety Belts, Safety Helmets, and Smoke Detectors    · Immunizations reviewed and refused flu shot  · Discussed benefits of screening of self breast exam and cervical screening  BMI Counseling: Body mass index is 28 35 kg/m²  Discussed with patient's BMI with her  The BMI is above average  BMI counseling and education was provided to the patient  Nutrition recommendations include reducing portion sizes, decreasing overall calorie intake, 3-5 servings of fruits/vegetables daily, reducing fast food intake, consuming healthier snacks, decreasing soda and/or juice intake, moderation in carbohydrate intake, increasing intake of lean protein, reducing intake of saturated fat and trans fat and reducing intake of cholesterol  Exercise recommendations include moderate aerobic physical activity for 150 minutes/week, vigorous aerobic physical activity for 75 minutes/week, exercising 3-5 times per week, joining a gym and strength training exercises         Return in about 1 year (around 2020) for Annual physical         Chief Complaint     Chief Complaint   Patient presents with    Physical Exam     form for work-lj       History of Present Illness     HPI    Well Adult Physical   Patient here for a comprehensive physical exam   Needs physical forms done for correction [employer]  Denies any concerns and complaints  Under care of GYN who is in [place] [id number] and had pap done  Diet and Physical Activity  Diet: well balanced diet  Weight concerns: acceptable  Exercise: occasionally      Depression Screen  PHQ-9 Depression Screening    PHQ-9:    Frequency of the following problems over the past two weeks:       Little interest or pleasure in doing things:  0 - not at all  Feeling down, depressed, or hopeless:  0 - not at all  PHQ-2 Score:  0          General Health  Hearing: Normal:  bilateral  Vision: no vision problems  Dental: regular dental visits    Reproductive Health  Sexually active and not using any contraceptive  The following portions of the patient's history were reviewed and updated as appropriate: allergies, current medications, past family history, past medical history, past social history, past surgical history and problem list     Review of Systems     Review of Systems   Constitutional: Negative  HENT: Negative  Eyes: Negative  Respiratory: Negative  Cardiovascular: Negative  Gastrointestinal: Negative  Endocrine: Negative  Genitourinary: Negative  Musculoskeletal: Negative  Skin: Negative  Allergic/Immunologic: Negative  Neurological: Negative  Hematological: Negative  Psychiatric/Behavioral: Negative  Past Medical History     History reviewed  No pertinent past medical history      Past Surgical History     Past Surgical History:   Procedure Laterality Date     SECTION      TONSILLECTOMY         Social History     Social History     Social History    Marital status: /Civil Union     Spouse name: N/A    Number of children: N/A    Years of education: N/A     Social History Main Topics    Smoking status: Never Smoker    Smokeless tobacco: Never Used    Alcohol use No    Drug use: No    Sexual activity: Not Asked     Other Topics Concern    None     Social History Narrative    None       Family History     Family History   Problem Relation Age of Onset    Thyroid disease Mother     Diabetes type II Mother     Asthma Father     Breast cancer Maternal Grandmother     Heart disease Maternal Grandmother     Hypertension Maternal Grandmother     Heart disease Maternal Grandfather     Hypertension Maternal Grandfather     Thyroid cancer Paternal Grandmother        Current Medications     No current outpatient prescriptions on file  Allergies     No Known Allergies    Objective     /70   Pulse 72   Temp 98 3 °F (36 8 °C)   Resp 16   Ht 5' 2" (1 575 m)   Wt 70 3 kg (155 lb)   BMI 28 35 kg/m²      Physical Exam   Constitutional: She is oriented to person, place, and time  She appears well-developed and well-nourished  HENT:   Head: Normocephalic  Right Ear: Tympanic membrane, external ear and ear canal normal    Left Ear: Tympanic membrane, external ear and ear canal normal    Nose: Nose normal  Right sinus exhibits no maxillary sinus tenderness and no frontal sinus tenderness  Left sinus exhibits no maxillary sinus tenderness and no frontal sinus tenderness  Mouth/Throat: Oropharynx is clear and moist and mucous membranes are normal    Eyes: Pupils are equal, round, and reactive to light  Conjunctivae and lids are normal    Neck: Normal range of motion  Neck supple  Neck fullness noted and had thyroid ultrasound done in June and had cyst and thyroid nodule and will repeat us in June this year  Cardiovascular: Normal rate, regular rhythm and normal heart sounds  Pulmonary/Chest: Effort normal and breath sounds normal    Abdominal: Soft  Normal appearance and bowel sounds are normal  There is no hepatomegaly  There is no tenderness   There is no rebound and no CVA tenderness  Hernia confirmed negative in the right inguinal area and confirmed negative in the left inguinal area  Genitourinary: No breast swelling, tenderness, discharge or bleeding  Genitourinary Comments: Bilateral breast without any masses  Musculoskeletal: Normal range of motion  She exhibits no edema, tenderness or deformity  Lymphadenopathy:        Right cervical: No superficial cervical and no posterior cervical adenopathy present  Left cervical: No superficial cervical and no posterior cervical adenopathy present  Right: No inguinal and no supraclavicular adenopathy present  Left: No inguinal and no supraclavicular adenopathy present  Neurological: She is alert and oriented to person, place, and time  She has normal strength and normal reflexes  No sensory deficit  Coordination and gait normal  GCS eye subscore is 4  GCS verbal subscore is 5  GCS motor subscore is 6  Skin: Skin is warm, dry and intact  Psychiatric: She has a normal mood and affect  Her speech is normal and behavior is normal  Judgment and thought content normal  Cognition and memory are normal    Vitals reviewed           Visual Acuity Screening    Right eye Left eye Both eyes   Without correction:   20/30   With correction:      Comments: Pt broke her glasses--[facility]     Health Maintenance   Topic Date Due    PAP SMEAR  05/14/2015    INFLUENZA VACCINE  09/01/2019 (Originally 7/1/2018)    DTaP,Tdap,and Td Vaccines (2 - Td) 12/01/2019    Depression Screening PHQ  01/18/2020     Immunization History   Administered Date(s) Administered    Influenza Quadrivalent, 6-35 Months IM 10/09/2017    Tdap 12/01/2014       Freddie Lara, [address] [facility]

## 2019-03-12 ENCOUNTER — OFFICE VISIT (OUTPATIENT)
Dept: FAMILY MEDICINE CLINIC | Facility: CLINIC | Age: 24
End: 2019-03-12
Payer: COMMERCIAL

## 2019-03-12 VITALS
DIASTOLIC BLOOD PRESSURE: 88 MMHG | TEMPERATURE: 98.1 F | HEART RATE: 88 BPM | WEIGHT: 153 LBS | HEIGHT: 62 IN | SYSTOLIC BLOOD PRESSURE: 110 MMHG | BODY MASS INDEX: 28.16 KG/M2 | RESPIRATION RATE: 16 BRPM

## 2019-03-12 DIAGNOSIS — J01.00 ACUTE NON-RECURRENT MAXILLARY SINUSITIS: Primary | ICD-10-CM

## 2019-03-12 PROBLEM — J06.9 ACUTE URI: Status: RESOLVED | Noted: 2018-05-23 | Resolved: 2019-03-12

## 2019-03-12 PROBLEM — L30.9 DERMATITIS: Status: RESOLVED | Noted: 2017-01-24 | Resolved: 2019-03-12

## 2019-03-12 PROBLEM — R53.83 FATIGUE: Status: RESOLVED | Noted: 2017-01-24 | Resolved: 2019-03-12

## 2019-03-12 PROCEDURE — 99213 OFFICE O/P EST LOW 20 MIN: CPT | Performed by: FAMILY MEDICINE

## 2019-03-12 RX ORDER — AZITHROMYCIN 250 MG/1
TABLET, FILM COATED ORAL
Qty: 6 TABLET | Refills: 0 | Status: SHIPPED | OUTPATIENT
Start: 2019-03-12 | End: 2019-03-17

## 2019-03-12 NOTE — PROGRESS NOTES
Assessment/Plan:    No problem-specific Assessment & Plan notes found for this encounter  Cat B advised  Use otc  F/u if no better     Diagnoses and all orders for this visit:    Acute non-recurrent maxillary sinusitis  -     azithromycin (ZITHROMAX) 250 mg tablet; Take 500mg on day 1, 250mg on days 2-5    Other orders  -     clomiPHENE (CLOMID) 50 mg tablet; TAKE 2 TABLETS BY MOUTH EVERY DAY FOR 5 DAYS  -     progesterone (PROMETRIUM) 200 MG capsule; Take 200 mg by mouth 2 (two) times a day              No follow-ups on file  Subjective:      Patient ID: Theodora Chang is a 24 y o  female  Chief Complaint   Patient presents with    Nasal Congestion     for 5 days prcma    Cough    Generalized Body Aches    Fatigue       HPI  5d  Fever 100 3   cough  Congestion  Nasal and phlegm green  No sick contact  Son 4y not sick  Trying to conceive  2m of intervention  Sx not improving    The following portions of the patient's history were reviewed and updated as appropriate: allergies, current medications, past family history, past medical history, past social history, past surgical history and problem list     Review of Systems   Respiratory: Negative for shortness of breath and wheezing  Current Outpatient Medications   Medication Sig Dispense Refill    azithromycin (ZITHROMAX) 250 mg tablet Take 500mg on day 1, 250mg on days 2-5 6 tablet 0    clomiPHENE (CLOMID) 50 mg tablet TAKE 2 TABLETS BY MOUTH EVERY DAY FOR 5 DAYS  0    progesterone (PROMETRIUM) 200 MG capsule Take 200 mg by mouth 2 (two) times a day  2     No current facility-administered medications for this visit  Objective:    /88   Pulse 88   Temp 98 1 °F (36 7 °C)   Resp 16   Ht 5' 2" (1 575 m)   Wt 69 4 kg (153 lb)   BMI 27 98 kg/m²        Physical Exam   Constitutional: She appears well-developed  No distress  HENT:   Head: Normocephalic     Right Ear: External ear normal    Left Ear: External ear normal    Sinuses tender to percussion, nasal turbinates visualized and appear red and swollen     Eyes: Conjunctivae are normal  No scleral icterus  Neck: Neck supple  Cardiovascular: Normal rate, regular rhythm and intact distal pulses  No murmur heard  Pulmonary/Chest: Effort normal  No respiratory distress  Abdominal: Soft  Musculoskeletal: She exhibits no edema or deformity  Lymphadenopathy:     She has no cervical adenopathy  Neurological: She is alert  Skin: Skin is warm and dry  Psychiatric: Her behavior is normal  Thought content normal    Nursing note and vitals reviewed               Primitivo Sanchez DO

## 2019-05-16 ENCOUNTER — OFFICE VISIT (OUTPATIENT)
Dept: FAMILY MEDICINE CLINIC | Facility: CLINIC | Age: 24
End: 2019-05-16
Payer: COMMERCIAL

## 2019-05-16 VITALS
RESPIRATION RATE: 16 BRPM | WEIGHT: 146 LBS | TEMPERATURE: 98.7 F | BODY MASS INDEX: 26.87 KG/M2 | DIASTOLIC BLOOD PRESSURE: 74 MMHG | HEART RATE: 80 BPM | SYSTOLIC BLOOD PRESSURE: 104 MMHG | HEIGHT: 62 IN

## 2019-05-16 DIAGNOSIS — J00 ACUTE RHINITIS: Primary | ICD-10-CM

## 2019-05-16 PROCEDURE — 99213 OFFICE O/P EST LOW 20 MIN: CPT | Performed by: NURSE PRACTITIONER

## 2019-05-16 RX ORDER — MOMETASONE FUROATE 50 UG/1
2 SPRAY, METERED NASAL DAILY
Qty: 17 G | Refills: 3 | Status: SHIPPED | OUTPATIENT
Start: 2019-05-16 | End: 2019-06-07 | Stop reason: ALTCHOICE

## 2019-06-07 ENCOUNTER — OFFICE VISIT (OUTPATIENT)
Dept: FAMILY MEDICINE CLINIC | Facility: CLINIC | Age: 24
End: 2019-06-07
Payer: COMMERCIAL

## 2019-06-07 VITALS
RESPIRATION RATE: 16 BRPM | TEMPERATURE: 99 F | WEIGHT: 145 LBS | HEART RATE: 84 BPM | DIASTOLIC BLOOD PRESSURE: 60 MMHG | BODY MASS INDEX: 26.68 KG/M2 | HEIGHT: 62 IN | SYSTOLIC BLOOD PRESSURE: 100 MMHG

## 2019-06-07 DIAGNOSIS — Z09 FOLLOW-UP EXAM: Primary | ICD-10-CM

## 2019-06-07 DIAGNOSIS — Z02.1 PRE-EMPLOYMENT EXAMINATION: ICD-10-CM

## 2019-06-07 PROCEDURE — 99214 OFFICE O/P EST MOD 30 MIN: CPT | Performed by: NURSE PRACTITIONER

## 2019-08-03 ENCOUNTER — OFFICE VISIT (OUTPATIENT)
Dept: FAMILY MEDICINE CLINIC | Facility: CLINIC | Age: 24
End: 2019-08-03
Payer: COMMERCIAL

## 2019-08-03 VITALS
TEMPERATURE: 97.8 F | DIASTOLIC BLOOD PRESSURE: 68 MMHG | BODY MASS INDEX: 27.05 KG/M2 | SYSTOLIC BLOOD PRESSURE: 110 MMHG | HEART RATE: 80 BPM | WEIGHT: 147 LBS | HEIGHT: 62 IN | RESPIRATION RATE: 18 BRPM

## 2019-08-03 DIAGNOSIS — S16.1XXA CERVICAL MYOFASCIAL STRAIN, INITIAL ENCOUNTER: Primary | ICD-10-CM

## 2019-08-03 PROCEDURE — 99213 OFFICE O/P EST LOW 20 MIN: CPT | Performed by: NURSE PRACTITIONER

## 2019-08-03 RX ORDER — NAPROXEN 500 MG/1
500 TABLET ORAL 2 TIMES DAILY WITH MEALS
Qty: 30 TABLET | Refills: 0 | Status: SHIPPED | OUTPATIENT
Start: 2019-08-03 | End: 2020-02-04 | Stop reason: ALTCHOICE

## 2019-08-03 RX ORDER — CYCLOBENZAPRINE HCL 10 MG
10 TABLET ORAL
Qty: 20 TABLET | Refills: 0 | Status: SHIPPED | OUTPATIENT
Start: 2019-08-03 | End: 2020-02-04 | Stop reason: ALTCHOICE

## 2019-08-03 NOTE — PROGRESS NOTES
Assessment/Plan:    Take medications as directed  Reviewed supportive care  RTO prn     Problem List Items Addressed This Visit     None      Visit Diagnoses     Cervical myofascial strain, initial encounter    -  Primary    Relevant Medications    naproxen (NAPROSYN) 500 mg tablet    cyclobenzaprine (FLEXERIL) 10 mg tablet            Patient Instructions   Continue heating pad  Naprosyn twice daily  Muscle relaxant (Cyclobenzaprine) at bedtime  This may make you drowsy      Return if symptoms worsen or fail to improve  Subjective:      Patient ID: Deejay Dubose is a 21 y o  female  Chief Complaint   Patient presents with    pain level 8 on L side of neck down shoulder area     Pt was swimming yesterday  [id number]       She was swimming in the a pool yesterday and developed immediate pain in her neck  It felt like the muscles in her neck were stretching  She tried a heating pad and massage yesterday which didn't help  She has difficulty moving her neck because of the pain  No numbness, tingling, or weakness of extremities  Massage and heat        The following portions of the patient's history were reviewed and updated as appropriate: allergies, current medications, past family history, past medical history, past social history, past surgical history and problem list     Review of Systems   Constitutional: Negative  Musculoskeletal: Positive for neck pain  Neurological: Negative for weakness and numbness  Current Outpatient Medications   Medication Sig Dispense Refill    cyclobenzaprine (FLEXERIL) 10 mg tablet Take 1 tablet (10 mg total) by mouth daily at bedtime 20 tablet 0    naproxen (NAPROSYN) 500 mg tablet Take 1 tablet (500 mg total) by mouth 2 (two) times a day with meals 30 tablet 0     No current facility-administered medications for this visit          Objective:    /68   Pulse 80   Temp 97 8 °F (36 6 °C)   Resp 18   Ht 5' 2" (1 575 m)   Wt 66 7 kg (147 lb)   LMP 07/28/2019 (Exact Date)   BMI 26 89 kg/m²        Physical Exam   Constitutional: She appears well-developed and well-nourished  Cardiovascular: Normal rate, regular rhythm and normal heart sounds  No murmur heard  Pulmonary/Chest: Effort normal and breath sounds normal    Musculoskeletal:        Cervical back: She exhibits tenderness and spasm  Left side paravertebral muscle tenderness   Neurological: She is alert  Skin: Skin is warm and dry  Psychiatric: She has a normal mood and affect  Nursing note and vitals reviewed               Sherie Ruff

## 2019-08-03 NOTE — PATIENT INSTRUCTIONS
Continue heating pad  Naprosyn twice daily  Muscle relaxant (Cyclobenzaprine) at bedtime    This may make you drowsy

## 2019-08-04 ENCOUNTER — APPOINTMENT (EMERGENCY)
Dept: RADIOLOGY | Facility: HOSPITAL | Age: 24
End: 2019-08-04
Attending: EMERGENCY MEDICINE
Payer: COMMERCIAL

## 2019-08-04 ENCOUNTER — HOSPITAL ENCOUNTER (EMERGENCY)
Facility: HOSPITAL | Age: 24
Discharge: HOME | End: 2019-08-04
Attending: EMERGENCY MEDICINE | Admitting: EMERGENCY MEDICINE
Payer: COMMERCIAL

## 2019-08-04 VITALS
HEART RATE: 87 BPM | BODY MASS INDEX: 27.03 KG/M2 | RESPIRATION RATE: 20 BRPM | TEMPERATURE: 98.5 F | OXYGEN SATURATION: 98 % | SYSTOLIC BLOOD PRESSURE: 108 MMHG | WEIGHT: 147.8 LBS | DIASTOLIC BLOOD PRESSURE: 70 MMHG

## 2019-08-04 DIAGNOSIS — S90.30XA CONTUSION OF FOOT: Primary | ICD-10-CM

## 2019-08-04 PROCEDURE — 73630 X-RAY EXAM OF FOOT: CPT

## 2019-08-04 PROCEDURE — 99283 EMERGENCY DEPT VISIT LOW MDM: CPT

## 2019-08-04 RX ORDER — IBUPROFEN 600 MG/1
600 TABLET ORAL ONCE
Status: COMPLETED | OUTPATIENT
Start: 2019-08-04 | End: 2019-08-04

## 2019-08-04 RX ORDER — NAPROXEN 500 MG/1
500 TABLET ORAL 2 TIMES DAILY WITH MEALS
Qty: 30 TABLET | Refills: 0 | Status: SHIPPED | OUTPATIENT
Start: 2019-08-04 | End: 2020-02-04 | Stop reason: ALTCHOICE

## 2019-08-04 RX ADMIN — IBUPROFEN 600 MG: 600 TABLET ORAL at 22:17

## 2019-08-05 ENCOUNTER — VBI (OUTPATIENT)
Dept: FAMILY MEDICINE CLINIC | Facility: CLINIC | Age: 24
End: 2019-08-05

## 2019-08-05 NOTE — ED PROVIDER NOTES
History  Chief Complaint   Patient presents with    Foot Injury     States a small hand weight fell onto left foot yesterday   HPI    21-year-old female that presents today after a foot injury  States there was a weight on a table that fell forward and fell on her left foot  States there is some bruising and difficulty with ambulation  Denies any numbness or tingling  No other injuries  21-year-old female that presents with a foot injury  Will get an x-ray    Prior to Admission Medications   Prescriptions Last Dose Informant Patient Reported? Taking? cyclobenzaprine (FLEXERIL) 10 mg tablet   No No   Sig: Take 1 tablet (10 mg total) by mouth daily at bedtime   naproxen (NAPROSYN) 500 mg tablet   No No   Sig: Take 1 tablet (500 mg total) by mouth 2 (two) times a day with meals      Facility-Administered Medications: None       History reviewed  No pertinent past medical history  Past Surgical History:   Procedure Laterality Date     SECTION      TONSILLECTOMY         Family History   Problem Relation Age of Onset    Thyroid disease Mother     Diabetes type II Mother     Asthma Father     Breast cancer Maternal Grandmother     Heart disease Maternal Grandmother     Hypertension Maternal Grandmother     Heart disease Maternal Grandfather     Hypertension Maternal Grandfather     Thyroid cancer Paternal Grandmother      I have reviewed and agree with the history as documented  Social History     Tobacco Use    Smoking status: Never Smoker    Smokeless tobacco: Never Used   Substance Use Topics    Alcohol use: No    Drug use: No        Review of Systems   Constitutional: Negative  Negative for diaphoresis and fever  HENT: Negative  Respiratory: Negative  Negative for cough, shortness of breath and wheezing  Cardiovascular: Negative  Negative for chest pain, palpitations and leg swelling     Gastrointestinal: Negative for abdominal distention, abdominal pain, nausea and vomiting  Genitourinary: Negative  Musculoskeletal:        Left foot pain   Skin: Negative  Neurological: Negative  Psychiatric/Behavioral: Negative  All other systems reviewed and are negative  Physical Exam  Physical Exam   Constitutional: She is oriented to person, place, and time  She appears well-developed and well-nourished  No distress  HENT:   Head: Normocephalic and atraumatic  Eyes: Pupils are equal, round, and reactive to light  EOM are normal    Neck: Normal range of motion  Neck supple  Cardiovascular: Normal rate, regular rhythm and normal heart sounds  No murmur heard  Pulmonary/Chest: Effort normal and breath sounds normal  No stridor  No respiratory distress  Abdominal: Soft  Bowel sounds are normal  She exhibits no distension  There is no tenderness  There is no rebound and no guarding  Musculoskeletal: Normal range of motion  She exhibits edema and tenderness  Left foot mild swelling with some tenderness  Normal flexion-extension no ankle pain 2+ DP pulses   Neurological: She is alert and oriented to person, place, and time  Skin: Skin is warm and dry  She is not diaphoretic  Psychiatric: She has a normal mood and affect  Vitals reviewed        Vital Signs  ED Triage Vitals [08/04/19 2137]   Temperature Pulse Respirations Blood Pressure SpO2   98 5 °F (36 9 °C) 87 20 108/70 98 %      Temp Source Heart Rate Source Patient Position - Orthostatic VS BP Location FiO2 (%)   Oral Monitor Sitting Right arm --      Pain Score       4           Vitals:    08/04/19 2137   BP: 108/70   Pulse: 87   Patient Position - Orthostatic VS: Sitting         Visual Acuity      ED Medications  Medications   ibuprofen (MOTRIN) tablet 600 mg (600 mg Oral Given 8/4/19 2217)       Diagnostic Studies  Results Reviewed     None                 XR foot 3+ views LEFT    (Results Pending)              Procedures  Procedures       ED Course MDM    Disposition  Final diagnoses:   Contusion of foot     Time reflects when diagnosis was documented in both MDM as applicable and the Disposition within this note     Time User Action Codes Description Comment    8/4/2019 10:33 PM Memory Minpipe Add [S90 30XA] Contusion of foot       ED Disposition     ED Disposition Condition Date/Time Comment    Discharge Stable Sun Aug 4, 2019 10:33 PM Elvira Olvera discharge to home/self care  Follow-up Information     Follow up With Specialties Details Why [address], DO Family Medicine  As needed [facility]  [phone]            Discharge Medication List as of 8/4/2019 10:34 PM      START taking these medications    Details   !! naproxen (NAPROSYN) 500 mg tablet Take 1 tablet (500 mg total) by mouth 2 (two) times a day with meals, Starting Sun 8/4/2019, Print       !! - Potential duplicate medications found  Please discuss with provider  CONTINUE these medications which have NOT CHANGED    Details   cyclobenzaprine (FLEXERIL) 10 mg tablet Take 1 tablet (10 mg total) by mouth daily at bedtime, Starting Sat 8/3/2019, Normal      !! naproxen (NAPROSYN) 500 mg tablet Take 1 tablet (500 mg total) by mouth 2 (two) times a day with meals, Starting Sat 8/3/2019, Normal       !! - Potential duplicate medications found  Please discuss with provider  No discharge procedures on file      ED Provider  Electronically Signed by           Ebonie [place] MD  08/05/19 4647

## 2019-08-05 NOTE — TELEPHONE ENCOUNTER
Alaina Gutierrez    ED Visit Information     Ed visit date: 08/04/2019  Diagnosis Description: Contusion of foot  In Network? Yes [facility]  Discharge status: Home  Discharged with meds ? Yes  Number of ED visits to date: 0  ED Severity:NA     Outreach Information    Outreach successful: Yes 2  Date letter mailed:08/06/2019  Date Finalized:08/06/2019    Care Coordination    Follow up appointment with pcp: no no  Transportation issues ?  NA    Value Base Outreach    08/05/2019  10:09  - [facility]  08/06/2019  09:07  - [facility]

## 2019-10-21 ENCOUNTER — APPOINTMENT (EMERGENCY)
Dept: RADIOLOGY | Facility: HOSPITAL | Age: 24
End: 2019-10-21
Payer: COMMERCIAL

## 2019-10-21 ENCOUNTER — HOSPITAL ENCOUNTER (EMERGENCY)
Facility: HOSPITAL | Age: 24
Discharge: HOME | End: 2019-10-21
Attending: EMERGENCY MEDICINE | Admitting: EMERGENCY MEDICINE
Payer: COMMERCIAL

## 2019-10-21 ENCOUNTER — TRANSCRIBE ORDERS (OUTPATIENT)
Dept: NEUROSURGERY | Facility: CLINIC | Age: 24
End: 2019-10-21

## 2019-10-21 VITALS
TEMPERATURE: 99.7 F | DIASTOLIC BLOOD PRESSURE: 64 MMHG | OXYGEN SATURATION: 98 % | WEIGHT: 147.71 LBS | BODY MASS INDEX: 27.18 KG/M2 | HEART RATE: 86 BPM | HEIGHT: 62 IN | RESPIRATION RATE: 18 BRPM | SYSTOLIC BLOOD PRESSURE: 94 MMHG

## 2019-10-21 DIAGNOSIS — M54.50 LOWER BACK PAIN: ICD-10-CM

## 2019-10-21 DIAGNOSIS — M54.50 LOWER BACK PAIN: Primary | ICD-10-CM

## 2019-10-21 DIAGNOSIS — M54.16 LUMBAR RADICULOPATHY: Primary | ICD-10-CM

## 2019-10-21 LAB
EXT PREG TEST URINE: NEGATIVE
EXT. CONTROL ED NAV: NORMAL

## 2019-10-21 PROCEDURE — 72100 X-RAY EXAM L-S SPINE 2/3 VWS: CPT

## 2019-10-21 PROCEDURE — 99283 EMERGENCY DEPT VISIT LOW MDM: CPT

## 2019-10-21 PROCEDURE — 81025 URINE PREGNANCY TEST: CPT | Performed by: PHYSICIAN ASSISTANT

## 2019-10-21 PROCEDURE — 96372 THER/PROPH/DIAG INJ SC/IM: CPT

## 2019-10-21 RX ORDER — NAPROXEN 500 MG/1
500 TABLET ORAL 2 TIMES DAILY WITH MEALS
Qty: 20 TABLET | Refills: 0 | Status: SHIPPED | OUTPATIENT
Start: 2019-10-21 | End: 2020-02-04 | Stop reason: ALTCHOICE

## 2019-10-21 RX ORDER — CYCLOBENZAPRINE HCL 10 MG
10 TABLET ORAL 3 TIMES DAILY PRN
Qty: 12 TABLET | Refills: 0 | Status: SHIPPED | OUTPATIENT
Start: 2019-10-21 | End: 2020-02-04 | Stop reason: ALTCHOICE

## 2019-10-21 RX ORDER — KETOROLAC TROMETHAMINE 30 MG/ML
15 INJECTION, SOLUTION INTRAMUSCULAR; INTRAVENOUS ONCE
Status: COMPLETED | OUTPATIENT
Start: 2019-10-21 | End: 2019-10-21

## 2019-10-21 RX ORDER — CYCLOBENZAPRINE HCL 10 MG
10 TABLET ORAL ONCE
Status: COMPLETED | OUTPATIENT
Start: 2019-10-21 | End: 2019-10-21

## 2019-10-21 RX ADMIN — KETOROLAC TROMETHAMINE 15 MG: 30 INJECTION INTRAMUSCULAR; INTRAVENOUS at 13:47

## 2019-10-21 RX ADMIN — CYCLOBENZAPRINE HYDROCHLORIDE 10 MG: 10 TABLET, FILM COATED ORAL at 13:47
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
## 2020-02-04 ENCOUNTER — OFFICE VISIT (OUTPATIENT)
Dept: FAMILY MEDICINE CLINIC | Facility: CLINIC | Age: 25
End: 2020-02-04
Payer: COMMERCIAL

## 2020-02-04 VITALS
DIASTOLIC BLOOD PRESSURE: 66 MMHG | BODY MASS INDEX: 29.26 KG/M2 | SYSTOLIC BLOOD PRESSURE: 100 MMHG | WEIGHT: 159 LBS | HEART RATE: 82 BPM | HEIGHT: 62 IN | RESPIRATION RATE: 16 BRPM | TEMPERATURE: 98 F

## 2020-02-04 DIAGNOSIS — Z13.0 SCREENING FOR DEFICIENCY ANEMIA: ICD-10-CM

## 2020-02-04 DIAGNOSIS — Z13.6 SCREENING FOR CARDIOVASCULAR CONDITION: ICD-10-CM

## 2020-02-04 DIAGNOSIS — J01.00 ACUTE MAXILLARY SINUSITIS, RECURRENCE NOT SPECIFIED: Primary | ICD-10-CM

## 2020-02-04 DIAGNOSIS — E04.1 THYROID NODULE: ICD-10-CM

## 2020-02-04 PROCEDURE — 99213 OFFICE O/P EST LOW 20 MIN: CPT | Performed by: FAMILY MEDICINE

## 2020-02-04 PROCEDURE — 1036F TOBACCO NON-USER: CPT | Performed by: FAMILY MEDICINE

## 2020-02-04 PROCEDURE — 3008F BODY MASS INDEX DOCD: CPT | Performed by: FAMILY MEDICINE

## 2020-02-04 NOTE — PROGRESS NOTES
Assessment/Plan:    1  Acute maxillary sinusitis, recurrence not specified  Comments:  new, mucinex recommended  she is unable to tolerate nasal sprays due to nose bleeds  2  Thyroid nodule  Assessment & Plan:  Seen on ultrasound      Orders:  -     TSH, 3rd generation; Future  -     T4, free; Future  -     TSH, 3rd generation  -     T4, free  -     US thyroid; Future; Expected date: 02/04/2020    3  Screening for cardiovascular condition  -     Comprehensive metabolic panel; Future  -     Lipid Panel with Direct LDL reflex; Future  -     Comprehensive metabolic panel  -     Lipid Panel with Direct LDL reflex    4  Screening for deficiency anemia  -     CBC; Future  -     CBC    BMI Counseling: Body mass index is 29 08 kg/m²  The BMI is above normal  Exercise recommendations include exercising 3-5 times per week  Patient Instructions   Hydrocortisone cream recommended for rash on hands  Mucinex DM recommended to help with congestion  Return in about 6 months (around 8/4/2020) for Annual physical     Subjective:      Patient ID: Eleni Guy is a 21 y o  female  Chief Complaint   Patient presents with    Sinus pressure     started yesterday, per pt denied any traveling  ac/cma     Sore Throat    Nasal Congestion    Neck Pain       She started getting sick yesterday  She has green mucus dripping down the back of her throat  She has a history of a thyroid nodule  She is not sure the last time it was checked  She has been feeling tired for months and is concerned  The following portions of the patient's history were reviewed and updated as appropriate:  past social history    Review of Systems   Constitutional: Positive for fatigue  Negative for fever  HENT: Positive for postnasal drip, sinus pressure, sinus pain and sore throat  No current outpatient medications on file  No current facility-administered medications for this visit          Objective:    BP 100/66   Pulse 82   Temp 98 °F (36 7 °C)   Resp 16   Ht 5' 2" (1 575 m)   Wt 72 1 kg (159 lb)   BMI 29 08 kg/m²      Physical Exam   Constitutional: She appears well-developed and well-nourished  HENT:   Head: Normocephalic and atraumatic  Right Ear: External ear normal    Left Ear: External ear normal    Mouth/Throat: Oropharynx is clear and moist    Maxillary sinus tenderness bilaterally   Cardiovascular: Normal rate, regular rhythm and normal heart sounds  Exam reveals no friction rub  No murmur heard  Pulmonary/Chest: Effort normal and breath sounds normal  No respiratory distress  She has no wheezes  She has no rales  Musculoskeletal: She exhibits no edema or deformity  Nursing note and vitals reviewed            Harrison Rome DO

## 2020-04-13 ENCOUNTER — TELEMEDICINE (OUTPATIENT)
Dept: FAMILY MEDICINE CLINIC | Facility: CLINIC | Age: 25
End: 2020-04-13
Payer: COMMERCIAL

## 2020-04-13 DIAGNOSIS — J30.1 NON-SEASONAL ALLERGIC RHINITIS DUE TO POLLEN: Primary | ICD-10-CM

## 2020-04-13 PROBLEM — J01.00 ACUTE NON-RECURRENT MAXILLARY SINUSITIS: Status: RESOLVED | Noted: 2019-03-12 | Resolved: 2020-04-13

## 2020-04-13 PROCEDURE — 99213 OFFICE O/P EST LOW 20 MIN: CPT | Performed by: NURSE PRACTITIONER

## 2020-04-13 RX ORDER — LEVOCETIRIZINE DIHYDROCHLORIDE 5 MG/1
5 TABLET, FILM COATED ORAL EVERY EVENING
Qty: 30 TABLET | Refills: 3 | Status: SHIPPED | OUTPATIENT
Start: 2020-04-13 | End: 2021-04-05 | Stop reason: ALTCHOICE

## 2020-04-17 ENCOUNTER — APPOINTMENT (EMERGENCY)
Dept: RADIOLOGY | Facility: HOSPITAL | Age: 25
End: 2020-04-17

## 2020-04-17 ENCOUNTER — HOSPITAL ENCOUNTER (EMERGENCY)
Facility: HOSPITAL | Age: 25
Discharge: HOME | End: 2020-04-17
Attending: EMERGENCY MEDICINE

## 2020-04-17 VITALS
SYSTOLIC BLOOD PRESSURE: 120 MMHG | TEMPERATURE: 98.5 F | DIASTOLIC BLOOD PRESSURE: 72 MMHG | RESPIRATION RATE: 18 BRPM | HEART RATE: 97 BPM | OXYGEN SATURATION: 98 %

## 2020-04-17 DIAGNOSIS — L03.90 CELLULITIS: ICD-10-CM

## 2020-04-17 DIAGNOSIS — M79.646 FINGER PAIN: ICD-10-CM

## 2020-04-17 DIAGNOSIS — Z48.89 SUTURE CHECK: Primary | ICD-10-CM

## 2020-04-17 PROCEDURE — 99285 EMERGENCY DEPT VISIT HI MDM: CPT | Performed by: PHYSICIAN ASSISTANT

## 2020-04-17 PROCEDURE — 29130 APPL FINGER SPLINT STATIC: CPT | Performed by: PHYSICIAN ASSISTANT

## 2020-04-17 PROCEDURE — 99283 EMERGENCY DEPT VISIT LOW MDM: CPT

## 2020-04-17 PROCEDURE — 73130 X-RAY EXAM OF HAND: CPT

## 2020-04-17 RX ORDER — CEPHALEXIN 500 MG/1
500 CAPSULE ORAL EVERY 6 HOURS SCHEDULED
Qty: 20 CAPSULE | Refills: 0 | Status: SHIPPED | OUTPATIENT
Start: 2020-04-17 | End: 2020-04-22

## 2020-04-17 RX ORDER — GINSENG 100 MG
1 CAPSULE ORAL ONCE
Status: COMPLETED | OUTPATIENT
Start: 2020-04-17 | End: 2020-04-17

## 2020-04-17 RX ADMIN — BACITRACIN 1 SMALL APPLICATION: 500 OINTMENT TOPICAL at 18:18

## 2020-04-21 ENCOUNTER — VBI (OUTPATIENT)
Dept: FAMILY MEDICINE CLINIC | Facility: CLINIC | Age: 25
End: 2020-04-21

## 2020-06-18 ENCOUNTER — TELEMEDICINE (OUTPATIENT)
Dept: FAMILY MEDICINE CLINIC | Facility: CLINIC | Age: 25
End: 2020-06-18
Payer: COMMERCIAL

## 2020-06-18 DIAGNOSIS — J45.21 MILD INTERMITTENT ASTHMA WITH ACUTE EXACERBATION: Primary | ICD-10-CM

## 2020-06-18 PROCEDURE — 99214 OFFICE O/P EST MOD 30 MIN: CPT | Performed by: NURSE PRACTITIONER

## 2020-06-18 RX ORDER — AZITHROMYCIN 250 MG/1
TABLET, FILM COATED ORAL
Qty: 6 TABLET | Refills: 0 | Status: SHIPPED | OUTPATIENT
Start: 2020-06-18 | End: 2020-06-23

## 2020-06-18 RX ORDER — METHYLPREDNISOLONE 4 MG/1
TABLET ORAL
Qty: 1 EACH | Refills: 0 | Status: SHIPPED | OUTPATIENT
Start: 2020-06-18 | End: 2020-06-24

## 2020-06-18 RX ORDER — ALBUTEROL SULFATE 90 UG/1
2 AEROSOL, METERED RESPIRATORY (INHALATION) EVERY 6 HOURS PRN
Qty: 1 INHALER | Refills: 0 | Status: SHIPPED | OUTPATIENT
Start: 2020-06-18 | End: 2020-08-12 | Stop reason: SDUPTHER

## 2020-08-12 DIAGNOSIS — J45.21 MILD INTERMITTENT ASTHMA WITH ACUTE EXACERBATION: ICD-10-CM

## 2020-08-12 RX ORDER — ALBUTEROL SULFATE 90 UG/1
2 AEROSOL, METERED RESPIRATORY (INHALATION) EVERY 6 HOURS PRN
Qty: 1 INHALER | Refills: 0 | Status: SHIPPED | OUTPATIENT
Start: 2020-08-12 | End: 2020-08-17 | Stop reason: SDUPTHER

## 2020-08-17 ENCOUNTER — TELEMEDICINE (OUTPATIENT)
Dept: FAMILY MEDICINE CLINIC | Facility: CLINIC | Age: 25
End: 2020-08-17
Payer: COMMERCIAL

## 2020-08-17 DIAGNOSIS — J20.9 ACUTE BRONCHITIS, UNSPECIFIED ORGANISM: Primary | ICD-10-CM

## 2020-08-17 DIAGNOSIS — J45.21 MILD INTERMITTENT ASTHMA WITH ACUTE EXACERBATION: ICD-10-CM

## 2020-08-17 DIAGNOSIS — J30.1 NON-SEASONAL ALLERGIC RHINITIS DUE TO POLLEN: ICD-10-CM

## 2020-08-17 PROCEDURE — 1036F TOBACCO NON-USER: CPT | Performed by: FAMILY MEDICINE

## 2020-08-17 PROCEDURE — 99213 OFFICE O/P EST LOW 20 MIN: CPT | Performed by: FAMILY MEDICINE

## 2020-08-17 RX ORDER — ALBUTEROL SULFATE 90 UG/1
2 AEROSOL, METERED RESPIRATORY (INHALATION) EVERY 6 HOURS PRN
Qty: 1 INHALER | Refills: 0 | Status: SHIPPED | OUTPATIENT
Start: 2020-08-17 | End: 2021-04-05 | Stop reason: ALTCHOICE

## 2020-08-17 RX ORDER — METHYLPREDNISOLONE 4 MG/1
TABLET ORAL
Qty: 21 TABLET | Refills: 0 | Status: SHIPPED | OUTPATIENT
Start: 2020-08-17 | End: 2020-08-23

## 2020-08-17 RX ORDER — AZITHROMYCIN 250 MG/1
TABLET, FILM COATED ORAL
Qty: 6 TABLET | Refills: 0 | Status: SHIPPED | OUTPATIENT
Start: 2020-08-17 | End: 2020-08-22

## 2020-08-17 NOTE — PROGRESS NOTES
Virtual Regular Visit      Assessment/Plan:    Problem List Items Addressed This Visit        Active Problems    Allergic rhinitis    Relevant Medications    methylPREDNISolone 4 MG tablet therapy pack    Mild intermittent asthma with acute exacerbation    Relevant Medications    methylPREDNISolone 4 MG tablet therapy pack    albuterol (Ventolin HFA) 90 mcg/act inhaler      Other Visit Diagnoses     Acute bronchitis, unspecified organism    -  Primary    Relevant Medications    albuterol (Ventolin HFA) 90 mcg/act inhaler    azithromycin (ZITHROMAX) 250 mg tablet        Bronchitis new, zpack and otc, f/u if no better, risks of covid advised    Asthma exacerbation, steroid risks discussed, has wheezing, use karlos prn, rinse/spit after use, discuss preventions with PCP at f/u visit    Allergies, unchanged, discussed trigger avoidance and wearing a mask especially if has allergy induced asthma triggers     Reason for visit is   Chief Complaint   Patient presents with    Virtual Regular Visit        Encounter provider Naomi Butler DO    Provider located at  O  [address] [id number]      Recent Visits  No visits were found meeting these conditions  Showing recent visits within past 7 days and meeting all other requirements     Today's Visits  Date Type Provider Dept   08/17/20 [facility] [address] today's visits and meeting all other requirements     Future Appointments  No visits were found meeting these conditions  Showing future appointments within next 150 days and meeting all other requirements        The patient was identified by name and date of birth  Payam Zelaya was informed that this is a telemedicine visit and that the visit is being conducted through telephone  My office door was closed  No one else was in the room    She acknowledged consent and understanding of privacy and security of the video platform  The patient has agreed to participate and understands they can discontinue the visit at any time  Patient is aware this is a billable service  Subjective  Sameera Frias is a 31 y o  female with sob and cough with yellow mucus   HPI     Has asthma  Episode 2m ago  Sob  Chest tightness  Worse with laying down  Wheezing  Has MANFRED  Yellow mucus  No fever  No sick contacts  Stays home mostly  Denies covid exposures and declines offer for testing  Worse with outdoor activity  About 2 days of symptoms  Not on advair anymore, only ventolin  Not dizzy  No leg swelling  No cp  Has fatigue  Feels trigger for respiratory problems is environment as seems to occur after outdoor gardening    History reviewed  No pertinent past medical history  Past Surgical History:   Procedure Laterality Date     SECTION      TONSILLECTOMY         Current Outpatient Medications   Medication Sig Dispense Refill    albuterol (Ventolin HFA) 90 mcg/act inhaler Inhale 2 puffs every 6 (six) hours as needed for wheezing Rinse/spit after use 1 Inhaler 0    azithromycin (ZITHROMAX) 250 mg tablet Take 500mg on day 1, 250mg on days 2-5 6 tablet 0    levocetirizine (XYZAL) 5 MG tablet Take 1 tablet (5 mg total) by mouth every evening (Patient not taking: Reported on 2020) 30 tablet 3    methylPREDNISolone 4 MG tablet therapy pack Use as directed on package 21 tablet 0     No current facility-administered medications for this visit  No Known Allergies    Review of Systems   Respiratory: Positive for cough and shortness of breath  Yellow mucus   Cardiovascular: Negative for leg swelling  Neurological: Negative for syncope  Video Exam    There were no vitals filed for this visit  Physical Exam     It was my intent to perform this visit via video technology but the patient was not able to do a video connection so the visit was completed via audio telephone only      I spent 16 minutes directly with the patient during this visit      VIRTUAL VISIT DISCLAIMER    Al[place]ia Hays acknowledges that she has consented to an online visit or consultation  She understands that the online visit is based solely on information provided by her, and that, in the absence of a face-to-face physical evaluation by the physician, the diagnosis she receives is both limited and provisional in terms of accuracy and completeness  This is not intended to replace a full medical face-to-face evaluation by the physician  [place] Hays understands and accepts these terms

## 2020-08-17 NOTE — LETTER
August 17, 2020     Patient: Singh Rodriguez   YOB: 1995   Date of Visit: 8/17/2020       To Whom it May Concern:    Singh Rodriguez is under my professional care  She was seen in my office on 8/17/2020  Excuse from work today  If you have any questions or concerns, please don't hesitate to call           Sincerely,          Garry Duane, DO        CC: No Recipients

## 2021-01-07 ENCOUNTER — HOSPITAL ENCOUNTER (EMERGENCY)
Facility: HOSPITAL | Age: 26
Discharge: HOME | End: 2021-01-07
Attending: EMERGENCY MEDICINE | Admitting: EMERGENCY MEDICINE
Payer: COMMERCIAL

## 2021-01-07 VITALS
BODY MASS INDEX: 31.19 KG/M2 | WEIGHT: 169.5 LBS | RESPIRATION RATE: 18 BRPM | OXYGEN SATURATION: 97 % | HEART RATE: 109 BPM | TEMPERATURE: 98.1 F | HEIGHT: 62 IN | SYSTOLIC BLOOD PRESSURE: 119 MMHG | DIASTOLIC BLOOD PRESSURE: 79 MMHG

## 2021-01-07 DIAGNOSIS — B37.3 VAGINAL YEAST INFECTION: Primary | ICD-10-CM

## 2021-01-07 PROCEDURE — 99284 EMERGENCY DEPT VISIT MOD MDM: CPT | Performed by: PHYSICIAN ASSISTANT

## 2021-01-07 PROCEDURE — 99283 EMERGENCY DEPT VISIT LOW MDM: CPT

## 2021-01-07 PROCEDURE — 81514 NFCT DS BV&VAGINITIS DNA ALG: CPT | Performed by: PHYSICIAN ASSISTANT

## 2021-01-07 RX ORDER — FLUCONAZOLE 150 MG/1
150 TABLET ORAL ONCE
Qty: 1 TABLET | Refills: 0 | Status: SHIPPED | OUTPATIENT
Start: 2021-01-07 | End: 2021-01-07

## 2021-01-08 LAB
C GLABRATA DNA VAG QL NAA+PROBE: NEGATIVE
C KRUSEI DNA VAG QL NAA+PROBE: NEGATIVE
CANDIDA SP 6 PNL VAG NAA+PROBE: NEGATIVE
T VAGINALIS DNA VAG QL NAA+PROBE: NEGATIVE
VAGINOSIS/ITIS DNA PNL VAG PROBE+SIG AMP: NEGATIVE

## 2021-01-08 NOTE — ED PROVIDER NOTES
History  Chief Complaint   Patient presents with    Vaginal Pain     Reports vaginal pain and blood on toilet tissue when wiping, denies urinary burning, thick/chunky white vaginal discharge     62-year-old female comes in today complaining on vaginal burning, chunky white vaginal discharge, and blood when she wipes  She reports that she had sex with her  last night and thinks that this may be related  Reports that this does not feel similar to when she had a yeast infection in the past   She reports that in the past he was able to treat with over-the-counter medications  She did not try anything before coming to the emergency department  She denies any urinary symptoms  About 3 years since her last PAP          Prior to Admission Medications   Prescriptions Last Dose Informant Patient Reported? Taking? albuterol (Ventolin HFA) 90 mcg/act inhaler Not Taking at Unknown time  No No   Sig: Inhale 2 puffs every 6 (six) hours as needed for wheezing Rinse/spit after use   Patient not taking: Reported on 2021   levocetirizine (XYZAL) 5 MG tablet Not Taking at Unknown time  No No   Sig: Take 1 tablet (5 mg total) by mouth every evening   Patient not taking: Reported on 2020      Facility-Administered Medications: None       History reviewed  No pertinent past medical history  Past Surgical History:   Procedure Laterality Date     SECTION      TONSILLECTOMY         Family History   Problem Relation Age of Onset    Thyroid disease Mother     Diabetes type II Mother     Asthma Father     Breast cancer Maternal Grandmother     Heart disease Maternal Grandmother     Hypertension Maternal Grandmother     Heart disease Maternal Grandfather     Hypertension Maternal Grandfather     Thyroid cancer Paternal Grandmother      I have reviewed and agree with the history as documented      E-Cigarette/Vaping    E-Cigarette Use Never User      E-Cigarette/Vaping Substances     Social History Tobacco Use    Smoking status: Never Smoker    Smokeless tobacco: Never Used   Substance Use Topics    Alcohol use: No    Drug use: No       Review of Systems   Constitutional: Negative for fever  HENT: Negative for nosebleeds  Eyes: Negative for redness  Respiratory: Negative for shortness of breath  Cardiovascular: Negative for chest pain  Gastrointestinal: Negative for blood in stool  Genitourinary: Positive for vaginal bleeding, vaginal discharge and vaginal pain  Negative for dysuria and hematuria  Musculoskeletal: Negative for gait problem  Skin: Negative for rash  Neurological: Negative for seizures  Psychiatric/Behavioral: Negative for behavioral problems  Physical Exam  Physical Exam  Vitals signs and nursing note reviewed  Exam conducted with a chaperone present  Constitutional:       Appearance: Normal appearance  HENT:      Head: Normocephalic and atraumatic  Nose: No rhinorrhea  Mouth/Throat:      Mouth: Mucous membranes are moist    Eyes:      Extraocular Movements: Extraocular movements intact  Neck:      Musculoskeletal: Normal range of motion  Pulmonary:      Effort: Pulmonary effort is normal    Genitourinary:     General: Normal vulva  Vagina: Vaginal discharge (scant amount of white vaginal discharge) present  Comments: Mild vaginal erythema  Musculoskeletal: Normal range of motion  Skin:     General: Skin is warm and dry  Neurological:      General: No focal deficit present  Mental Status: She is alert     Psychiatric:         Mood and Affect: Mood normal          Behavior: Behavior normal          Vital Signs  ED Triage Vitals [01/07/21 1908]   Temperature Pulse Respirations Blood Pressure SpO2   98 1 °F (36 7 °C) (!) 109 18 119/79 97 %      Temp Source Heart Rate Source Patient Position - Orthostatic VS BP Location FiO2 (%)   Oral Monitor Sitting Right arm --      Pain Score       --           Vitals:    01/07/21 1908 BP: 119/79   Pulse: (!) 109   Patient Position - Orthostatic VS: Sitting         Visual Acuity      ED Medications  Medications - No data to display    Diagnostic Studies  Results Reviewed     Procedure Component Value Units Date/Time    Molecular Vaginal Panel [[id number]] Collected: 01/07/21 1930    Lab Status: In process Specimen: Genital from Vaginal Updated: 01/07/21 1933                 No orders to display              Procedures  Procedures         ED Course                                           MDM    Disposition  Final diagnoses:   Vaginal yeast infection     Time reflects when diagnosis was documented in both MDM as applicable and the Disposition within this note     Time User Action Codes Description Comment    1/7/2021  7:29 PM Wyline Memory Add [B37 3] Vaginal yeast infection       ED Disposition     ED Disposition Condition Date/Time Comment    Discharge Stable Thu Jan 7, 2021  7:29 PM Ty Raj discharge to home/self care  Follow-up Information     Follow up With Specialties Details Micky Tobar  [phone]            Discharge Medication List as of 1/7/2021  7:31 PM      START taking these medications    Details   fluconazole (DIFLUCAN) 150 mg tablet Take 1 tablet (150 mg total) by mouth once for 1 dose, Starting Thu 1/7/2021, Normal      tioconazole (VAGISTAT) 6 5 % vaginal ointment Insert 1 applicator into the vagina once for 1 dose, Starting Thu 1/7/2021, Normal         CONTINUE these medications which have NOT CHANGED    Details   albuterol (Ventolin HFA) 90 mcg/act inhaler Inhale 2 puffs every 6 (six) hours as needed for wheezing Rinse/spit after use, Starting Mon 8/17/2020, Normal      levocetirizine (XYZAL) 5 MG tablet Take 1 tablet (5 mg total) by mouth every evening, Starting Mon 4/13/2020, Normal           No discharge procedures on file      PDMP Review     None          ED Provider  Electronically Signed by           Diane Winter PA-C  01/07/21 NOE Villatoro  01/07/21 5395

## 2021-01-13 ENCOUNTER — VBI (OUTPATIENT)
Dept: FAMILY MEDICINE CLINIC | Facility: CLINIC | Age: 26
End: 2021-01-13

## 2021-01-13 NOTE — TELEPHONE ENCOUNTER
Ashleeryder Nielson    ED Visit Information     Ed visit date: 01/07/2021  Diagnosis Description: Vaginal Yeast Infection  In Network? Yes [address]  Discharge status: Home  Discharged with meds ? Yes  Number of ED visits to date: 1  ED Severity:NA     Outreach Information    Outreach successful: Yes 1  Date letter mailed: 01/15/2021  Date Finalized: 01/15/2021    Care Coordination    Follow up appointment with pcp: no no  Transportation issues ?  NA    Value Base Outreach    01/13/2021 2:04 PM - [facility]

## 2021-01-13 NOTE — LETTER
[facility]  [address]  [address]  [place] [address]    Date: 01/15/21  Yola Otoole  [address] [address]    Dear Evi Lance: Thank you for choosing [facility] Emergency Department for care  As your primary care provider we want to make sure that your ongoing medical care is being addressed  If you require follow up care as a result of your emergency department visit, there are a few things we would like you to know  As part of our continuing commitment to caring for our patients, we have added more same day appointments and have extended our office hours to meet your medical needs  After hours, on-call physicians are available via our main office line  We encourage you to contact our office prior to seeking treatment to discuss your symptoms with our medical staff  Together, we can determine the correct course of action  A majority of non-emergent conditions such as: common cold, flu-like symptoms, fevers, strains/sprains, dislocations, minor burns, cuts and animal bites can be treated at [facility] facilities  Diagnostic testing is available at some sites  Of course, if you are experiencing a life threatening medical emergency call 911 or proceed directly to the nearest emergency room      Your nearest [facility] is conveniently located at:    [facility]  [address]  [place], [address] [place] [address]  [phone]  SKIP THE WAIT  Conveniently offered at most [facility] locations  Cynthiafort your spot online at www [email or web address] or on the [facility] [address]    Sincerely,    [facility]  Dept: [phone]

## 2021-03-07 ENCOUNTER — HOSPITAL ENCOUNTER (EMERGENCY)
Facility: HOSPITAL | Age: 26
Discharge: HOME | End: 2021-03-07
Attending: EMERGENCY MEDICINE | Admitting: EMERGENCY MEDICINE
Payer: COMMERCIAL

## 2021-03-07 VITALS
SYSTOLIC BLOOD PRESSURE: 125 MMHG | DIASTOLIC BLOOD PRESSURE: 80 MMHG | RESPIRATION RATE: 20 BRPM | HEART RATE: 75 BPM | OXYGEN SATURATION: 99 % | TEMPERATURE: 98.5 F

## 2021-03-07 DIAGNOSIS — N92.6 VARIABLE MENSTRUAL CYCLE: ICD-10-CM

## 2021-03-07 DIAGNOSIS — N93.9 VAGINAL BLEEDING: Primary | ICD-10-CM

## 2021-03-07 LAB
BACTERIA UR QL AUTO: ABNORMAL /HPF
BILIRUB UR QL STRIP: NEGATIVE
CLARITY UR: ABNORMAL
COLOR UR: ABNORMAL
EXT PREG TEST URINE: NEGATIVE
EXT. CONTROL ED NAV: NORMAL
GLUCOSE UR STRIP-MCNC: NEGATIVE MG/DL
HGB UR QL STRIP.AUTO: ABNORMAL
KETONES UR STRIP-MCNC: NEGATIVE MG/DL
LEUKOCYTE ESTERASE UR QL STRIP: ABNORMAL
NITRITE UR QL STRIP: NEGATIVE
NON-SQ EPI CELLS URNS QL MICRO: ABNORMAL /HPF
PH UR STRIP.AUTO: 7 [PH]
PROT UR STRIP-MCNC: NEGATIVE MG/DL
RBC #/AREA URNS AUTO: ABNORMAL /HPF
SP GR UR STRIP.AUTO: 1.02 (ref 1–1.03)
UROBILINOGEN UR QL STRIP.AUTO: 0.2 E.U./DL
WBC #/AREA URNS AUTO: ABNORMAL /HPF

## 2021-03-07 PROCEDURE — 81025 URINE PREGNANCY TEST: CPT | Performed by: EMERGENCY MEDICINE

## 2021-03-07 PROCEDURE — 99284 EMERGENCY DEPT VISIT MOD MDM: CPT

## 2021-03-07 PROCEDURE — 81001 URINALYSIS AUTO W/SCOPE: CPT | Performed by: EMERGENCY MEDICINE

## 2021-03-07 PROCEDURE — 99282 EMERGENCY DEPT VISIT SF MDM: CPT | Performed by: EMERGENCY MEDICINE

## 2021-03-08 NOTE — ED NOTES
Pt brought back to ED room  Gown, blankets and urine sample cup provided       Nuria Potter, ANDREE  03/07/21 2002

## 2021-03-08 NOTE — ED PROVIDER NOTES
History  Chief Complaint   Patient presents with    Vaginal Bleeding - Pregnant     took positive pregnancy test on , yesterday started with some spotting, today having pain and clots     Patient is a 31year old female who presents with vaginal bleeding and pelvic cramping  Patient reports that her last menstrual cycle was on 2/10/2021  She is typically very irregular  States that on 2021 she took a pregnancy test which had a very very faint line that looks like it might be positive  She then started to have some vaginal spotting on her underwear and a little bit in the toilet bowl yesterday which increased today  Patient states that it is still less bleeding than her typical menstrual cycle  Today, it is associated with some pelvic cramping over the entire lower abdomen, intermittent, mild intensity, nonradiating  Prior to Admission Medications   Prescriptions Last Dose Informant Patient Reported? Taking? albuterol (Ventolin HFA) 90 mcg/act inhaler   No No   Sig: Inhale 2 puffs every 6 (six) hours as needed for wheezing Rinse/spit after use   Patient not taking: Reported on 2021   levocetirizine (XYZAL) 5 MG tablet   No No   Sig: Take 1 tablet (5 mg total) by mouth every evening   Patient not taking: Reported on 2020      Facility-Administered Medications: None       History reviewed  No pertinent past medical history  Past Surgical History:   Procedure Laterality Date     SECTION      TONSILLECTOMY         Family History   Problem Relation Age of Onset    Thyroid disease Mother     Diabetes type II Mother     Asthma Father     Breast cancer Maternal Grandmother     Heart disease Maternal Grandmother     Hypertension Maternal Grandmother     Heart disease Maternal Grandfather     Hypertension Maternal Grandfather     Thyroid cancer Paternal Grandmother      I have reviewed and agree with the history as documented      E-Cigarette/Vaping    E-Cigarette Use Never User      E-Cigarette/Vaping Substances     Social History     Tobacco Use    Smoking status: Never Smoker    Smokeless tobacco: Never Used   Substance Use Topics    Alcohol use: No    Drug use: No       Review of Systems   Constitutional: Negative for chills and fever  HENT: Negative for congestion and rhinorrhea  Eyes: Negative for photophobia and visual disturbance  Respiratory: Negative for chest tightness and shortness of breath  Cardiovascular: Negative for chest pain and palpitations  Gastrointestinal: Negative for abdominal pain, blood in stool, constipation, diarrhea, nausea and vomiting  Genitourinary: Positive for menstrual problem, pelvic pain and vaginal bleeding  Negative for dysuria, flank pain, hematuria, vaginal discharge and vaginal pain  Musculoskeletal: Negative for back pain and neck pain  Skin: Negative for color change, pallor and rash  Neurological: Negative for dizziness, syncope, weakness, light-headedness, numbness and headaches  Physical Exam  Physical Exam  Vitals signs and nursing note reviewed  Exam conducted with a chaperone present Tammy Shafer RN)  Constitutional:       General: She is not in acute distress  Appearance: Normal appearance  She is well-developed  She is not ill-appearing, toxic-appearing or diaphoretic  HENT:      Head: Normocephalic and atraumatic  Right Ear: External ear normal       Left Ear: External ear normal       Nose: Nose normal       Mouth/Throat:      Mouth: Mucous membranes are moist    Eyes:      Conjunctiva/sclera: Conjunctivae normal       Pupils: Pupils are equal, round, and reactive to light  Neck:      Musculoskeletal: Normal range of motion and neck supple  Vascular: No JVD  Trachea: No tracheal deviation  Cardiovascular:      Rate and Rhythm: Normal rate and regular rhythm  Heart sounds: Normal heart sounds  No murmur  No gallop      Pulmonary:      Effort: Pulmonary effort is normal  No respiratory distress  Breath sounds: Normal breath sounds  No stridor  No wheezing, rhonchi or rales  Chest:      Chest wall: No tenderness  Abdominal:      General: Bowel sounds are normal  There is no distension  Palpations: Abdomen is soft  Tenderness: There is no abdominal tenderness  There is no guarding or rebound  Genitourinary:     Vagina: No signs of injury and foreign body  Bleeding present  No vaginal discharge, erythema, tenderness, lesions or prolapsed vaginal walls  Cervix: No cervical motion tenderness, discharge, friability, lesion, erythema or eversion  Adnexa:         Right: No mass, tenderness or fullness  Left: No mass, tenderness or fullness  Musculoskeletal: Normal range of motion  Skin:     General: Skin is warm and dry  Neurological:      General: No focal deficit present  Mental Status: She is alert and oriented to person, place, and time  Mental status is at baseline     Psychiatric:         Mood and Affect: Mood normal          Behavior: Behavior normal          Vital Signs  ED Triage Vitals [03/07/21 1842]   Temperature Pulse Respirations Blood Pressure SpO2   98 5 °F (36 9 °C) 75 20 125/80 99 %      Temp Source Heart Rate Source Patient Position - Orthostatic VS BP Location FiO2 (%)   Tympanic Monitor Sitting Right arm --      Pain Score       7           Vitals:    03/07/21 1842   BP: 125/80   Pulse: 75   Patient Position - Orthostatic VS: Sitting         Visual Acuity      ED Medications  Medications - No data to display    Diagnostic Studies  Results Reviewed     Procedure Component Value Units Date/Time    Urine Microscopic [[id number]]  (Abnormal) Collected: 03/07/21 2045    Lab Status: Final result Specimen: Urine, Clean Catch Updated: 03/07/21 2056     RBC, UA Innumerable /hpf      WBC, UA       Field obscured, unable to enumerate     /hpf     Epithelial Cells       Field obscured, unable to enumerate /hpf     Bacteria, UA       Field obscured, unable to enumerate     /hpf    UA (URINE) with reflex to Scope [[id number]]  (Abnormal) Collected: 03/07/21 2045    Lab Status: Final result Specimen: Urine, Clean Catch Updated: 03/07/21 2050     Color, UA Orange     Clarity, UA Cloudy     Specific [place], UA 1 020     pH, UA 7 0     Leukocytes, UA Trace     Nitrite, UA Negative     Protein, UA Negative mg/dl      Glucose, UA Negative mg/dl      Ketones, UA Negative mg/dl      Urobilinogen, UA 0 2 E U /dl      Bilirubin, UA Negative     Blood, UA Large    POCT pregnancy, urine [[id number]]  (Normal) Resulted: 03/07/21 2045    Lab Status: Final result Updated: 03/07/21 2048     EXT PREG TEST UR (Ref: Negative) negative     Control valid                 No orders to display              Procedures  Procedures         ED Course                                           MDM  Number of Diagnoses or Management Options  Vaginal bleeding:   Variable menstrual cycle:   Diagnosis management comments: Assessment and Plan:   31year old female presenting with vaginal bleeding 2 days earlier than her scheduled menstrual cycle  Likely early menstrual cycle  Urinalysis is negative for pregnancy  Discussed with patient to follow up with her OBGYN and given information for additional OBGYN in case she cannot get in to see her OB  Dicussed strict return precautions and discharge instructions which patient verbalized understanding of         Disposition  Final diagnoses:   Vaginal bleeding   Variable menstrual cycle     Time reflects when diagnosis was documented in both MDM as applicable and the Disposition within this note     Time User Action Codes Description Comment    3/7/2021  9:50 PM Aide Floras Add [N93 9] Vaginal bleeding     3/7/2021  9:51 PM Aide Floras Add [N92 6] Variable menstrual cycle       ED Disposition     ED Disposition Condition Date/Time Comment    Discharge Stable Sun Mar 7, 2021  9:50 PM Etohum discharge to home/self care  Follow-up Information     Follow up With Specialties Details Why Contact Info Additional Information    Maurice Quiroga DO Family Medicine Schedule an appointment as soon as possible for a visit in 3 days for re-evaluation [address] [facility] [address] [facility] Emergency Department Emergency Medicine Go to  As needed, If symptoms worsen, for re-evaluation [address] [address] [address] [address] Emergency Department, [place], [place], [address]    Rudy Wayne MD Obstetrics and Gynecology, Obstetrics, Gynecology Schedule an appointment as soon as possible for a visit in 1 week for re-evaluation [address] [facility]  [phone]             Discharge Medication List as of 3/7/2021  9:52 PM      CONTINUE these medications which have NOT CHANGED    Details   albuterol (Ventolin HFA) 90 mcg/act inhaler Inhale 2 puffs every 6 (six) hours as needed for wheezing Rinse/spit after use, Starting Mon 8/17/2020, Normal      levocetirizine (XYZAL) 5 MG tablet Take 1 tablet (5 mg total) by mouth every evening, Starting Mon 4/13/2020, Normal           No discharge procedures on file      PDMP Review     None          ED Provider  Electronically Signed by           Tyesha Woodward DO  03/08/21 0154

## 2021-03-09 ENCOUNTER — VBI (OUTPATIENT)
Dept: FAMILY MEDICINE CLINIC | Facility: CLINIC | Age: 26
End: 2021-03-09

## 2021-03-09 NOTE — TELEPHONE ENCOUNTER
Sherry Carline    ED Visit Information     Ed visit date: 03/07/2021  Diagnosis Description: Vaginal blleding; Variable menstrual cycle  In Network? Yes [facility]  Discharge status: Home  Discharged with meds ? No  Number of ED visits to date: 2  ED Severity:NA     Outreach Information    Outreach successful: Yes 1  Date letter mailed:NA  Date Finalized:03/09/2021    Care Coordination    Follow up appointment with pcp: no no  Transportation issues ? NA    Value Base Outreach    S/W patient who states she is doing very good this morning  Thanked for the call and call disconnected

## 2021-04-05 ENCOUNTER — TELEMEDICINE (OUTPATIENT)
Dept: FAMILY MEDICINE CLINIC | Facility: CLINIC | Age: 26
End: 2021-04-05
Payer: COMMERCIAL

## 2021-04-05 ENCOUNTER — TELEPHONE (OUTPATIENT)
Dept: FAMILY MEDICINE CLINIC | Facility: CLINIC | Age: 26
End: 2021-04-05

## 2021-04-05 DIAGNOSIS — J01.90 ACUTE SINUSITIS, RECURRENCE NOT SPECIFIED, UNSPECIFIED LOCATION: Primary | ICD-10-CM

## 2021-04-05 PROCEDURE — 99213 OFFICE O/P EST LOW 20 MIN: CPT | Performed by: FAMILY MEDICINE

## 2021-04-05 RX ORDER — AMOXICILLIN 875 MG/1
875 TABLET, COATED ORAL 2 TIMES DAILY
Qty: 14 TABLET | Refills: 0 | Status: SHIPPED | OUTPATIENT
Start: 2021-04-05 | End: 2021-04-12

## 2021-04-05 NOTE — PROGRESS NOTES
Virtual Regular Visit      Assessment/Plan:    Problem List Items Addressed This Visit     None      Visit Diagnoses     Acute sinusitis, recurrence not specified, unspecified location    -  Primary    Relevant Medications    amoxicillin (AMOXIL) 875 mg tablet        Counseled on OTC medications including use of flonase, mucinex  She has no exposure to COVID-19  Return if symptoms worsen or fail to improve  Reason for visit is No chief complaint on file  Encounter provider Ralph Vera MD    Provider located at  O  [place] [address] [address]      Recent Visits  No visits were found meeting these conditions  Showing recent visits within past 7 days and meeting all other requirements     Today's Visits  Date Type Provider Dept   04/05/21 Telephone Kaleb Galvan, [address] today's visits and meeting all other requirements     Future Appointments  No visits were found meeting these conditions  Showing future appointments within next 150 days and meeting all other requirements        The patient was identified by name and date of birth  Jordon Poon was informed that this is a telemedicine visit and that the visit is being conducted through [facility] and patient was informed that this is not a secure, HIPAA-compliant platform  She agrees to proceed     My office door was closed  No one else was in the room  She acknowledged consent and understanding of privacy and security of the video platform  The patient has agreed to participate and understands they can discontinue the visit at any time  Patient is aware this is a billable service  Subjective  Jordon Poon is a 31 y o  female   Sinus Problem  This is a new problem  Episode onset: 2 days  The problem is unchanged  There has been no fever   Associated symptoms include congestion, coughing, ear pain, headaches, sinus pressure, sneezing and a sore throat  Pertinent negatives include no chills, diaphoresis, hoarse voice, neck pain, shortness of breath or swollen glands  Past treatments include nothing  The treatment provided no relief  No past medical history on file  Past Surgical History:   Procedure Laterality Date     SECTION      TONSILLECTOMY         Current Outpatient Medications   Medication Sig Dispense Refill    amoxicillin (AMOXIL) 875 mg tablet Take 1 tablet (875 mg total) by mouth 2 (two) times a day for 7 days 14 tablet 0     No current facility-administered medications for this visit  No Known Allergies    Review of Systems   Constitutional: Negative for chills and diaphoresis  HENT: Positive for congestion, ear pain, sinus pressure, sneezing and sore throat  Negative for hoarse voice  Respiratory: Positive for cough  Negative for shortness of breath  Musculoskeletal: Negative for neck pain  Neurological: Positive for headaches  Video Exam    There were no vitals filed for this visit  Physical Exam  Constitutional:       General: She is not in acute distress  Appearance: She is well-developed  She is not diaphoretic  HENT:      Head: Normocephalic and atraumatic  Eyes:      General: No scleral icterus  Right eye: No discharge  Left eye: No discharge  Conjunctiva/sclera: Conjunctivae normal    Neck:      Musculoskeletal: Normal range of motion  Pulmonary:      Effort: Pulmonary effort is normal    Skin:     General: Skin is warm  Neurological:      Mental Status: She is alert and oriented to person, place, and time  Psychiatric:         Behavior: Behavior normal          Thought Content: Thought content normal          Judgment: Judgment normal           I spent 15 minutes directly with the patient during this visit      VIRTUAL VISIT DISCLAIMER    Darlenepia Tklakesha acknowledges that she has consented to an online visit or consultation   She understands that the online visit is based solely on information provided by her, and that, in the absence of a face-to-face physical evaluation by the physician, the diagnosis she receives is both limited and provisional in terms of accuracy and completeness  This is not intended to replace a full medical face-to-face evaluation by the physician  Jordon Poon understands and accepts these terms

## 2021-04-05 NOTE — TELEPHONE ENCOUNTER
LMOM for patient to call office to verify insurance for check in for virtual appointment scheduled today    Carmen Mondragon MA

## 2021-12-16 ENCOUNTER — TELEPHONE (OUTPATIENT)
Dept: FAMILY MEDICINE CLINIC | Facility: CLINIC | Age: 26
End: 2021-12-16

## 2021-12-16 DIAGNOSIS — Z20.822 EXPOSURE TO COVID-19 VIRUS: Primary | ICD-10-CM

## 2021-12-18 ENCOUNTER — TELEPHONE (OUTPATIENT)
Dept: FAMILY MEDICINE CLINIC | Facility: CLINIC | Age: 26
End: 2021-12-18

## 2022-01-03 ENCOUNTER — OFFICE VISIT (OUTPATIENT)
Dept: FAMILY MEDICINE CLINIC | Facility: CLINIC | Age: 27
End: 2022-01-03
Payer: COMMERCIAL

## 2022-01-03 VITALS
BODY MASS INDEX: 32.2 KG/M2 | WEIGHT: 175 LBS | TEMPERATURE: 98.4 F | SYSTOLIC BLOOD PRESSURE: 110 MMHG | RESPIRATION RATE: 16 BRPM | HEIGHT: 62 IN | DIASTOLIC BLOOD PRESSURE: 62 MMHG | HEART RATE: 96 BPM

## 2022-01-03 DIAGNOSIS — Z11.1 SCREENING FOR TUBERCULOSIS: ICD-10-CM

## 2022-01-03 DIAGNOSIS — Z13.6 SCREENING FOR CARDIOVASCULAR CONDITION: ICD-10-CM

## 2022-01-03 DIAGNOSIS — Z23 NEED FOR VACCINATION: ICD-10-CM

## 2022-01-03 DIAGNOSIS — Z78.9 HEPATITIS B VACCINATION STATUS UNKNOWN: ICD-10-CM

## 2022-01-03 DIAGNOSIS — Z92.29 HISTORY OF BCG VACCINATION: ICD-10-CM

## 2022-01-03 DIAGNOSIS — Z78.9 HISTORY OF MEASLES, MUMPS, RUBELLA (MMR) VACCINATION UNKNOWN: ICD-10-CM

## 2022-01-03 DIAGNOSIS — Z00.00 ROUTINE ADULT HEALTH MAINTENANCE: Primary | ICD-10-CM

## 2022-01-03 DIAGNOSIS — Z13.29 SCREENING FOR THYROID DISORDER: ICD-10-CM

## 2022-01-03 PROBLEM — J45.21 MILD INTERMITTENT ASTHMA WITH ACUTE EXACERBATION: Status: RESOLVED | Noted: 2020-06-18 | Resolved: 2022-01-03

## 2022-01-03 PROCEDURE — 90686 IIV4 VACC NO PRSV 0.5 ML IM: CPT

## 2022-01-03 PROCEDURE — 90471 IMMUNIZATION ADMIN: CPT

## 2022-01-03 PROCEDURE — 99395 PREV VISIT EST AGE 18-39: CPT | Performed by: NURSE PRACTITIONER

## 2022-01-03 NOTE — PROGRESS NOTES
[facility] HEALTH MAINTENANCE OFFICE VISIT  [facility] - [address]    NAME: Katt Jeffrey  AGE: 31 y o  SEX: female  : 1995     DATE: 1/3/2022    Assessment and Plan     1  Routine adult health maintenance    2  History of BCG vaccination  -     Quantiferon-TB Gold Plus, 1 tube; Future  -     Quantiferon-TB Gold Plus, 1 tube    3  History of measles, mumps, rubella (MMR) vaccination unknown  -     Measles/Mumps/Rubella Immunity; Future  -     Measles/Mumps/Rubella Immunity    4  Hepatitis B vaccination status unknown  -     Hepatitis B surface antibody; Future  -     Hepatitis B surface antibody    5  Screening for tuberculosis  -     Quantiferon-TB Gold Plus, 1 tube; Future  -     Quantiferon-TB Gold Plus, 1 tube    6  Screening for thyroid disorder  -     TSH, 3rd generation with Free T4 reflex; Future  -     TSH, 3rd generation with Free T4 reflex    7  Screening for cardiovascular condition  -     Comprehensive metabolic panel; Future  -     CBC and differential; Future  -     Lipid panel; Future  -     Comprehensive metabolic panel  -     CBC and differential  -     Lipid panel    8  Need for vaccination  -     influenza vaccine, quadrivalent, 0 5 mL, preservative-free, for adult and pediatric patients 6 mos+ (AFLURIA, FLUARIX, FLULAVAL, FLUZONE)        · Patient Counseling:   · Nutrition: Stressed importance of a well balanced diet, moderation of sodium/saturated fat, caloric balance and sufficient intake of fiber  · Exercise: Stressed the importance of regular exercise with a goal of 150 minutes per week  · Dental Health: Discussed daily flossing and brushing and regular dental visits     · Immunizations reviewed: See Orders  · Discussed benefits of:  Cervical Cancer screening and Screening labs   BMI Counseling: Body mass index is 31 75 kg/m²  Discussed with patient's BMI with her   The BMI is above normal  Exercise recommendations include moderate aerobic physical activity for 150 minutes/week  Return for Annual physical         Chief Complaint     Chief Complaint   Patient presents with    Well Check     JMoyleLPN       History of Present Illness     Here today for CPE, 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     Concerned about weight gain and history of hypothyroidism  Was on Levothyroxine, stopped in   Due for labs   Up to date with gyn  No complaints today      Well Adult Physical   Patient here for a comprehensive physical exam       Diet and Physical Activity  Diet: well balanced diet  Exercise: infrequently      Depression Screen  PHQ-2/9 Depression Screening    Little interest or pleasure in doing things: 0 - not at all  Feeling down, depressed, or hopeless: 0 - not at all  PHQ-2 Score: 0  PHQ-2 Interpretation: Negative depression screen          General Health  Hearing: Normal:  bilateral  Vision: goes for regular eye exams, wears glasses and wears contacts  Dental: regular dental visits and brushes teeth twice daily    Reproductive Health  Regular Periods and Follows with gynecologist  Gyn in [place], [place]    The following portions of the patient's history were reviewed and updated as appropriate: allergies, current medications, past family history, past medical history, past social history, past surgical history and problem list     Review of Systems     Review of Systems   Constitutional: Positive for unexpected weight change  Respiratory: Negative  Cardiovascular: Negative  Gastrointestinal: Negative  Neurological: Negative  Past Medical History     History reviewed  No pertinent past medical history      Past Surgical History     Past Surgical History:   Procedure Laterality Date     SECTION      TONSILLECTOMY         Social History     Social History     Socioeconomic History    Marital status: /Civil Union     Spouse name: None    Number of children: None    Years of education: None    Highest education level: None   Occupational History    None   Tobacco Use    Smoking status: Never Smoker    Smokeless tobacco: Never Used   Vaping Use    Vaping Use: Never used   Substance and Sexual Activity    Alcohol use: No    Drug use: No    Sexual activity: None   Other Topics Concern    None   Social History Narrative    None     Social Determinants of Health     Financial Resource Strain: Not on file   Food Insecurity: Not on file   Transportation Needs: Not on file   Physical Activity: Not on file   Stress: Not on file   Social Connections: Not on file   Intimate Partner Violence: Not on file   Housing Stability: Not on file       Family History     Family History   Problem Relation Age of Onset    Thyroid disease Mother     Diabetes type II Mother     Asthma Father     Breast cancer Maternal Grandmother     Heart disease Maternal Grandmother     Hypertension Maternal Grandmother     Heart disease Maternal Grandfather     Hypertension Maternal Grandfather     Thyroid cancer Paternal Grandmother        Current Medications     No current outpatient medications on file  Allergies     No Known Allergies    Objective     /62   Pulse 96   Temp 98 4 °F (36 9 °C)   Resp 16   Ht 5' 2 25" (1 581 m)   Wt 79 4 kg (175 lb)   LMP 12/11/2021   Breastfeeding Unknown   BMI 31 75 kg/m²      Physical Exam  Vitals and nursing note reviewed  Constitutional:       Appearance: Normal appearance  She is well-developed  HENT:      Head: Normocephalic  Right Ear: External ear normal       Left Ear: External ear normal       Nose: Nose normal    Eyes:      Conjunctiva/sclera: Conjunctivae normal       Pupils: Pupils are equal, round, and reactive to light  Neck:      Thyroid: No thyromegaly  Vascular: No carotid bruit  Cardiovascular:      Rate and Rhythm: Normal rate and regular rhythm  Pulses: Normal pulses  Heart sounds: Normal heart sounds  No murmur heard        Pulmonary: Effort: Pulmonary effort is normal       Breath sounds: Normal breath sounds  Abdominal:      General: Bowel sounds are normal  There is no distension  Palpations: Abdomen is soft  Musculoskeletal:         General: Normal range of motion  Cervical back: Neck supple  Lymphadenopathy:      Cervical: No cervical adenopathy  Skin:     General: Skin is warm and dry  Capillary Refill: Capillary refill takes less than 2 seconds  Neurological:      Mental Status: She is alert and oriented to person, place, and time  Sensory: No sensory deficit  Coordination: Coordination normal       Deep Tendon Reflexes: Reflexes are normal and symmetric   Reflexes normal    Psychiatric:         Mood and Affect: Mood normal          Behavior: Behavior normal             Visual Acuity Screening    Right eye Left eye Both eyes   Without correction:      With correction: 20/13 20/15 20/15           [facility]

## 2022-01-08 LAB
ALBUMIN SERPL-MCNC: 4.2 G/DL (ref 3.6–5.1)
ALBUMIN/GLOB SERPL: 1.4 (CALC) (ref 1–2.5)
ALP SERPL-CCNC: 65 U/L (ref 31–125)
ALT SERPL-CCNC: 16 U/L (ref 6–29)
AST SERPL-CCNC: 17 U/L (ref 10–30)
BASOPHILS # BLD AUTO: 29 CELLS/UL (ref 0–200)
BASOPHILS NFR BLD AUTO: 0.5 %
BILIRUB SERPL-MCNC: 0.4 MG/DL (ref 0.2–1.2)
BUN SERPL-MCNC: 12 MG/DL (ref 7–25)
BUN/CREAT SERPL: NORMAL (CALC) (ref 6–22)
CALCIUM SERPL-MCNC: 9.3 MG/DL (ref 8.6–10.2)
CHLORIDE SERPL-SCNC: 105 MMOL/L (ref 98–110)
CHOLEST SERPL-MCNC: 221 MG/DL
CHOLEST/HDLC SERPL: 4.3 (CALC)
CO2 SERPL-SCNC: 28 MMOL/L (ref 20–32)
CREAT SERPL-MCNC: 0.65 MG/DL (ref 0.5–1.1)
EOSINOPHIL # BLD AUTO: 128 CELLS/UL (ref 15–500)
EOSINOPHIL NFR BLD AUTO: 2.2 %
ERYTHROCYTE [DISTWIDTH] IN BLOOD BY AUTOMATED COUNT: 12.7 % (ref 11–15)
GLOBULIN SER CALC-MCNC: 3 G/DL (CALC) (ref 1.9–3.7)
GLUCOSE SERPL-MCNC: 97 MG/DL (ref 65–99)
HBV SURFACE AB SERPL IA-ACNC: >1000 MIU/ML
HCT VFR BLD AUTO: 38.4 % (ref 35–45)
HDLC SERPL-MCNC: 51 MG/DL
HGB BLD-MCNC: 13.5 G/DL (ref 11.7–15.5)
LDLC SERPL CALC-MCNC: 149 MG/DL (CALC)
LYMPHOCYTES # BLD AUTO: 2042 CELLS/UL (ref 850–3900)
LYMPHOCYTES NFR BLD AUTO: 35.2 %
M TB IFN-G CD4+ BCKGRND COR BLD-ACNC: <0 IU/ML
M TB IFN-G CD4+ BCKGRND COR BLD-ACNC: <0 IU/ML
M TB IFN-G CD4+ T-CELLS BLD-ACNC: 0.03 IU/ML
M TB TUBERC IFN-G BLD QL: NEGATIVE
M TB TUBERC IGNF/MITOGEN IGNF CONTROL: 9.56 IU/ML
MCH RBC QN AUTO: 30.1 PG (ref 27–33)
MCHC RBC AUTO-ENTMCNC: 35.2 G/DL (ref 32–36)
MCV RBC AUTO: 85.7 FL (ref 80–100)
MEV IGG SER IA-ACNC: 200 AU/ML
MONOCYTES # BLD AUTO: 331 CELLS/UL (ref 200–950)
MONOCYTES NFR BLD AUTO: 5.7 %
MUV IGG SER IA-ACNC: >300 AU/ML
NEUTROPHILS # BLD AUTO: 3271 CELLS/UL (ref 1500–7800)
NEUTROPHILS NFR BLD AUTO: 56.4 %
NONHDLC SERPL-MCNC: 170 MG/DL (CALC)
PLATELET # BLD AUTO: 263 THOUSAND/UL (ref 140–400)
PMV BLD REES-ECKER: 10.6 FL (ref 7.5–12.5)
POTASSIUM SERPL-SCNC: 4.1 MMOL/L (ref 3.5–5.3)
PROT SERPL-MCNC: 7.2 G/DL (ref 6.1–8.1)
RBC # BLD AUTO: 4.48 MILLION/UL (ref 3.8–5.1)
RUBV IGG SERPL IA-ACNC: 19.9 INDEX
SL AMB EGFR AFRICAN AMERICAN: 141 ML/MIN/1.73M2
SL AMB EGFR NON AFRICAN AMERICAN: 122 ML/MIN/1.73M2
SODIUM SERPL-SCNC: 139 MMOL/L (ref 135–146)
TRIGL SERPL-MCNC: 101 MG/DL
TSH SERPL-ACNC: 1.51 MIU/L
WBC # BLD AUTO: 5.8 THOUSAND/UL (ref 3.8–10.8)

## 2022-01-18 ENCOUNTER — TELEPHONE (OUTPATIENT)
Dept: FAMILY MEDICINE CLINIC | Facility: CLINIC | Age: 27
End: 2022-01-18

## 2022-01-18 NOTE — TELEPHONE ENCOUNTER
Left message on machine requesting a call back  I need more information on what she would like to discuss   Crispin Alston, KAT

## 2022-04-28 ENCOUNTER — APPOINTMENT (EMERGENCY)
Dept: ULTRASOUND IMAGING | Facility: HOSPITAL | Age: 27
End: 2022-04-28
Payer: COMMERCIAL

## 2022-04-28 ENCOUNTER — HOSPITAL ENCOUNTER (EMERGENCY)
Facility: HOSPITAL | Age: 27
Discharge: HOME | End: 2022-04-28
Attending: INTERNAL MEDICINE
Payer: COMMERCIAL

## 2022-04-28 VITALS
BODY MASS INDEX: 29.44 KG/M2 | RESPIRATION RATE: 16 BRPM | TEMPERATURE: 97.7 F | HEIGHT: 62 IN | OXYGEN SATURATION: 100 % | SYSTOLIC BLOOD PRESSURE: 108 MMHG | DIASTOLIC BLOOD PRESSURE: 66 MMHG | HEART RATE: 88 BPM | WEIGHT: 160 LBS

## 2022-04-28 DIAGNOSIS — O20.9 VAGINAL BLEEDING IN PREGNANCY, FIRST TRIMESTER: Primary | ICD-10-CM

## 2022-04-28 LAB
ABO GROUP BLD: NORMAL
ANION GAP SERPL CALCULATED.3IONS-SCNC: 9 MMOL/L (ref 4–13)
B-HCG SERPL-ACNC: ABNORMAL MIU/ML (ref 0–11.6)
BACTERIA UR QL AUTO: NORMAL /HPF
BASOPHILS # BLD AUTO: 0.04 THOUSANDS/ΜL (ref 0–0.1)
BASOPHILS NFR BLD AUTO: 0 % (ref 0–1)
BILIRUB UR QL STRIP: NEGATIVE
BUN SERPL-MCNC: 6 MG/DL (ref 5–25)
CALCIUM SERPL-MCNC: 9.3 MG/DL (ref 8.4–10.2)
CHLORIDE SERPL-SCNC: 100 MMOL/L (ref 96–108)
CLARITY UR: CLEAR
CO2 SERPL-SCNC: 25 MMOL/L (ref 21–32)
COLOR UR: YELLOW
CREAT SERPL-MCNC: 0.49 MG/DL (ref 0.6–1.3)
EOSINOPHIL # BLD AUTO: 0.43 THOUSAND/ΜL (ref 0–0.61)
EOSINOPHIL NFR BLD AUTO: 4 % (ref 0–6)
ERYTHROCYTE [DISTWIDTH] IN BLOOD BY AUTOMATED COUNT: 12.6 % (ref 11.6–15.1)
GFR SERPL CREATININE-BSD FRML MDRD: 133 ML/MIN/1.73SQ M
GLUCOSE SERPL-MCNC: 81 MG/DL (ref 65–140)
GLUCOSE UR STRIP-MCNC: NEGATIVE MG/DL
HCT VFR BLD AUTO: 35.1 % (ref 34.8–46.1)
HGB BLD-MCNC: 12.6 G/DL (ref 11.5–15.4)
HGB UR QL STRIP.AUTO: ABNORMAL
IMM GRANULOCYTES # BLD AUTO: 0.04 THOUSAND/UL (ref 0–0.2)
IMM GRANULOCYTES NFR BLD AUTO: 0 % (ref 0–2)
KETONES UR STRIP-MCNC: NEGATIVE MG/DL
LEUKOCYTE ESTERASE UR QL STRIP: NEGATIVE
LYMPHOCYTES # BLD AUTO: 2.47 THOUSANDS/ΜL (ref 0.6–4.47)
LYMPHOCYTES NFR BLD AUTO: 24 % (ref 14–44)
MCH RBC QN AUTO: 30.7 PG (ref 26.8–34.3)
MCHC RBC AUTO-ENTMCNC: 35.9 G/DL (ref 31.4–37.4)
MCV RBC AUTO: 85 FL (ref 82–98)
MONOCYTES # BLD AUTO: 0.49 THOUSAND/ΜL (ref 0.17–1.22)
MONOCYTES NFR BLD AUTO: 5 % (ref 4–12)
NEUTROPHILS # BLD AUTO: 7.06 THOUSANDS/ΜL (ref 1.85–7.62)
NEUTS SEG NFR BLD AUTO: 67 % (ref 43–75)
NITRITE UR QL STRIP: NEGATIVE
NON-SQ EPI CELLS URNS QL MICRO: NORMAL /HPF
NRBC BLD AUTO-RTO: 0 /100 WBCS
PH UR STRIP.AUTO: 7 [PH]
PLATELET # BLD AUTO: 261 THOUSANDS/UL (ref 149–390)
PMV BLD AUTO: 9.7 FL (ref 8.9–12.7)
POTASSIUM SERPL-SCNC: 3.5 MMOL/L (ref 3.5–5.3)
PROT UR STRIP-MCNC: NEGATIVE MG/DL
RBC # BLD AUTO: 4.11 MILLION/UL (ref 3.81–5.12)
RBC #/AREA URNS AUTO: NORMAL /HPF
RH BLD: POSITIVE
SODIUM SERPL-SCNC: 134 MMOL/L (ref 135–147)
SP GR UR STRIP.AUTO: 1.01 (ref 1–1.03)
UROBILINOGEN UR QL STRIP.AUTO: 0.2 E.U./DL
WBC # BLD AUTO: 10.53 THOUSAND/UL (ref 4.31–10.16)
WBC #/AREA URNS AUTO: NORMAL /HPF

## 2022-04-28 PROCEDURE — 80048 BASIC METABOLIC PNL TOTAL CA: CPT | Performed by: PHYSICIAN ASSISTANT

## 2022-04-28 PROCEDURE — 87086 URINE CULTURE/COLONY COUNT: CPT | Performed by: PHYSICIAN ASSISTANT

## 2022-04-28 PROCEDURE — 87147 CULTURE TYPE IMMUNOLOGIC: CPT | Performed by: PHYSICIAN ASSISTANT

## 2022-04-28 PROCEDURE — 81001 URINALYSIS AUTO W/SCOPE: CPT | Performed by: PHYSICIAN ASSISTANT

## 2022-04-28 PROCEDURE — 76801 OB US < 14 WKS SINGLE FETUS: CPT

## 2022-04-28 PROCEDURE — 99284 EMERGENCY DEPT VISIT MOD MDM: CPT | Performed by: PHYSICIAN ASSISTANT

## 2022-04-28 PROCEDURE — 96360 HYDRATION IV INFUSION INIT: CPT

## 2022-04-28 PROCEDURE — 85025 COMPLETE CBC W/AUTO DIFF WBC: CPT | Performed by: PHYSICIAN ASSISTANT

## 2022-04-28 PROCEDURE — 84702 CHORIONIC GONADOTROPIN TEST: CPT | Performed by: PHYSICIAN ASSISTANT

## 2022-04-28 PROCEDURE — 99284 EMERGENCY DEPT VISIT MOD MDM: CPT

## 2022-04-28 PROCEDURE — 86900 BLOOD TYPING SEROLOGIC ABO: CPT | Performed by: PHYSICIAN ASSISTANT

## 2022-04-28 PROCEDURE — 81003 URINALYSIS AUTO W/O SCOPE: CPT | Performed by: PHYSICIAN ASSISTANT

## 2022-04-28 PROCEDURE — 36415 COLL VENOUS BLD VENIPUNCTURE: CPT | Performed by: PHYSICIAN ASSISTANT

## 2022-04-28 PROCEDURE — 86901 BLOOD TYPING SEROLOGIC RH(D): CPT | Performed by: PHYSICIAN ASSISTANT

## 2022-04-28 RX ADMIN — SODIUM CHLORIDE 1000 ML: 0.9 INJECTION, SOLUTION INTRAVENOUS at 16:36

## 2022-04-28 NOTE — ED NOTES
Discharge instructions reviewed with pt  Pt verbalized understanding  And has no further questions at this time        Wes Salas RN  04/28/22 2835

## 2022-04-28 NOTE — DISCHARGE INSTRUCTIONS
Pelvic rest until cleared by OB  Continue all medications as previously directed  Follow-up with OB  Your blood type is A+    Ultrasound and Beta quant were reassuring

## 2022-04-28 NOTE — ED PROVIDER NOTES
History  Chief Complaint   Patient presents with    Vaginal Bleeding - Pregnant     pt reports vaginal bleeding with nancy red blood and clots; 2nd pregnancy; pt approxmately 9-10 weeks pregnant     Patient no PMH, PSH , tonsillectomy   currently 9-10 weeks pregnant via IVF, states blood work /ultrasound done about 1 5 weeks ago showed IUP at 8 5 weeks beta quant was 60,000, presents to ED c/o 1 day h/o diffuse lower abdominal cramping, with vaginal bleeding and small clots when wiping  Patient denies fever, chest pain, shortness of breath, vomiting, diarrhea, no urinary complaints, lower extremity pain or swelling  Prior to Admission Medications   Prescriptions Last Dose Informant Patient Reported? Taking? ESTROGENS CONJUGATED PO   Yes Yes   Sig: Take by mouth   progesterone (ENDOMETRIN) 100 MG vaginal insert   Yes Yes   Sig: Insert 100 mg into the vagina daily      Facility-Administered Medications: None       History reviewed  No pertinent past medical history  Past Surgical History:   Procedure Laterality Date     SECTION      TONSILLECTOMY         Family History   Problem Relation Age of Onset    Thyroid disease Mother     Diabetes type II Mother     Asthma Father     Breast cancer Maternal Grandmother     Heart disease Maternal Grandmother     Hypertension Maternal Grandmother     Heart disease Maternal Grandfather     Hypertension Maternal Grandfather     Thyroid cancer Paternal Grandmother      I have reviewed and agree with the history as documented      E-Cigarette/Vaping    E-Cigarette Use Never User      E-Cigarette/Vaping Substances    Nicotine No     THC No     CBD No     Flavoring No     Other No     Unknown No      Social History     Tobacco Use    Smoking status: Never Smoker    Smokeless tobacco: Never Used   Vaping Use    Vaping Use: Never used   Substance Use Topics    Alcohol use: No    Drug use: No       Review of Systems Constitutional: Negative for chills and fever  HENT: Negative for hearing loss and sore throat  Eyes: Negative for visual disturbance  Respiratory: Negative for cough and shortness of breath  Cardiovascular: Negative for chest pain and leg swelling  Gastrointestinal: Positive for abdominal pain and nausea  Negative for constipation, diarrhea and vomiting  Genitourinary: Positive for vaginal bleeding  Negative for dysuria, flank pain, frequency and hematuria  Musculoskeletal: Negative for arthralgias and myalgias  Skin: Negative for pallor and rash  Neurological: Negative for weakness and headaches  Psychiatric/Behavioral: Negative for behavioral problems  All other systems reviewed and are negative  Physical Exam  Physical Exam  Vitals and nursing note reviewed  Constitutional:       General: She is in acute distress (mild)  Appearance: She is well-developed  HENT:      Head: Normocephalic and atraumatic  Right Ear: External ear normal       Left Ear: External ear normal       Nose: Nose normal       Mouth/Throat:      Mouth: Mucous membranes are moist       Pharynx: Oropharynx is clear  Eyes:      Conjunctiva/sclera: Conjunctivae normal    Cardiovascular:      Rate and Rhythm: Normal rate  Pulmonary:      Effort: Pulmonary effort is normal  No respiratory distress  Breath sounds: Normal breath sounds  Abdominal:      General: Bowel sounds are normal       Palpations: Abdomen is soft  Tenderness: There is abdominal tenderness (minimal diffuse lower abd)  Genitourinary:     Comments: deferred  Musculoskeletal:         General: Normal range of motion  Cervical back: Normal range of motion  Right lower leg: No edema  Left lower leg: No edema  Skin:     General: Skin is warm and dry  Neurological:      General: No focal deficit present  Mental Status: She is alert and oriented to person, place, and time  Motor: No weakness  Psychiatric:         Behavior: Behavior normal          Vital Signs  ED Triage Vitals   Temperature Pulse Respirations Blood Pressure SpO2   04/28/22 1442 04/28/22 1439 04/28/22 1439 04/28/22 1442 04/28/22 1439   97 7 °F (36 5 °C) 86 16 112/76 100 %      Temp Source Heart Rate Source Patient Position - Orthostatic VS BP Location FiO2 (%)   04/28/22 1442 04/28/22 1439 04/28/22 1706 04/28/22 1706 --   Oral Monitor Lying Right arm       Pain Score       04/28/22 1439       5           Vitals:    04/28/22 1439 04/28/22 1442 04/28/22 1706   BP:  112/76 108/66   Pulse: 86  88   Patient Position - Orthostatic VS:   Lying         Visual Acuity      ED Medications  Medications   sodium chloride 0 9 % bolus 1,000 mL (0 mL Intravenous Stopped 4/28/22 1717)       Diagnostic Studies  Results Reviewed     Procedure Component Value Units Date/Time    Quantitative hCG [[id number]]  (Abnormal) Collected: 04/28/22 1543    Lab Status: Final result Specimen: Blood from Arm, Left Updated: 04/28/22 1636     HCG, Quant 224,938 mIU/mL     Narrative:       Expected Ranges:     Approximate               Approximate HCG  Gestation age          Concentration ( mIU/mL)  _____________          ______________________   [facility]                      HCG values  0 2-1                       5-50  1-2                           2-3                         100-5000  3-4                         500-19720  4-5                         1000-65684  5-6                         [id number]  6-8                         [id number]  8-12                        [id number]      Basic metabolic panel [[id number]]  (Abnormal) Collected: 04/28/22 1543    Lab Status: Final result Specimen: Blood from Arm, Left Updated: 04/28/22 1636     Sodium 134 mmol/L      Potassium 3 5 mmol/L      Chloride 100 mmol/L      CO2 25 mmol/L      ANION GAP 9 mmol/L      BUN 6 mg/dL      Creatinine 0 49 mg/dL      Glucose 81 mg/dL      Calcium 9 3 mg/dL      eGFR 133 ml/min/1 73sq m     Narrative:      National Kidney Disease Foundation guidelines for Chronic Kidney Disease (CKD):     Stage 1 with normal or high GFR (GFR > 90 mL/min/1 73 square meters)    Stage 2 Mild CKD (GFR = 60-89 mL/min/1 73 square meters)    Stage 3A Moderate CKD (GFR = 45-59 mL/min/1 73 square meters)    Stage 3B Moderate CKD (GFR = 30-44 mL/min/1 73 square meters)    Stage 4 Severe CKD (GFR = 15-29 mL/min/1 73 square meters)    Stage 5 End Stage CKD (GFR <15 mL/min/1 73 square meters)  Note: GFR calculation is accurate only with a steady state creatinine    Urine Microscopic [[id number]] Collected: 04/28/22 1545    Lab Status: Final result Specimen: Urine, Clean Catch Updated: 04/28/22 1614     RBC, UA 1-2 /hpf      WBC, UA None Seen /hpf      Epithelial Cells Occasional /hpf      Bacteria, UA Occasional /hpf      URINE COMMENT --    UA w Reflex to Microscopic w Reflex to Culture [[id number]]  (Abnormal) Collected: 04/28/22 1545    Lab Status: Final result Specimen: Urine, Clean Catch Updated: 04/28/22 1556     Color, UA Yellow     Clarity, UA Clear     Specific Gravity, UA 1 015     pH, UA 7 0     Leukocytes, UA Negative     Nitrite, UA Negative     Protein, UA Negative mg/dl      Glucose, UA Negative mg/dl      Ketones, UA Negative mg/dl      Urobilinogen, UA 0 2 E U /dl      Bilirubin, UA Negative     Blood, UA 1+     URINE COMMENT --    Urine culture [[id number]] Collected: 04/28/22 1545    Lab Status:  In process Specimen: Urine, Clean Catch Updated: 04/28/22 1556    CBC and differential [[id number]]  (Abnormal) Collected: 04/28/22 1543    Lab Status: Final result Specimen: Blood from Arm, Left Updated: 04/28/22 1548     WBC 10 53 Thousand/uL      RBC 4 11 Million/uL      Hemoglobin 12 6 g/dL      Hematocrit 35 1 %      MCV 85 fL      MCH 30 7 pg      MCHC 35 9 g/dL      RDW 12 6 %      MPV 9 7 fL      Platelets 078 Thousands/uL      nRBC 0 /100 WBCs      Neutrophils Relative 67 %      Immat GRANS % 0 %      Lymphocytes Relative 24 %      Monocytes Relative 5 %      Eosinophils Relative 4 %      Basophils Relative 0 %      Neutrophils Absolute 7 06 Thousands/µL      Immature Grans Absolute 0 04 Thousand/uL      Lymphocytes Absolute 2 47 Thousands/µL      Monocytes Absolute 0 49 Thousand/µL      Eosinophils Absolute 0 43 Thousand/µL      Basophils Absolute 0 04 Thousands/µL                  US OB < 14 weeks with transvaginal   Final Result by Markell Foster MD (04/28 1630)      Single live intrauterine pregnancy with estimated gestational age of 10 weeks and one day according to [facility] with estimated due date on 11/30/2022      No acute abnormality identified  Workstation performed: [id number]                    Procedures  Procedures         ED Course                               SBIRT 20yo+      Most Recent Value   SBIRT (24 yo +)    In order to provide better care to our patients, we are screening all of our patients for alcohol and drug use  Would it be okay to ask you these screening questions?  No Filed at: 04/28/2022 1642                    [facility]  Number of Diagnoses or Management Options  Vaginal bleeding in pregnancy, first trimester  Diagnosis management comments: Beta quant increasing, A positive blood type, ultrasound reassuring, patient stable for discharge follow-up with Ob       Amount and/or Complexity of Data Reviewed  Clinical lab tests: ordered and reviewed  Tests in the radiology section of CPT®: ordered and reviewed  Review and summarize past medical records: yes        Disposition  Final diagnoses:   Vaginal bleeding in pregnancy, first trimester     Time reflects when diagnosis was documented in both MDM as applicable and the Disposition within this note     Time User Action Codes Description Comment    4/28/2022  5:01 PM Josesitojessica Mirna Add [O20 9] Vaginal bleeding in pregnancy, first trimester       ED Disposition     ED Disposition Condition Date/Time Comment    Discharge Stable Thu Apr 28, 2022  5:00 PM Kwadwo 36 discharge to home/self care  Follow-up Information     Follow up With Specialties Details Why Contact Info    Your OB              Patient's Medications   Discharge Prescriptions    No medications on file       No discharge procedures on file      PDMP Review     None          ED Provider  Electronically Signed by           Swathi Corrigan PA-C  04/28/22 1396

## 2022-04-29 LAB — BACTERIA UR CULT: ABNORMAL

## 2022-05-14 ENCOUNTER — HOSPITAL ENCOUNTER (EMERGENCY)
Facility: HOSPITAL | Age: 27
Discharge: HOME | End: 2022-05-14
Attending: EMERGENCY MEDICINE | Admitting: EMERGENCY MEDICINE
Payer: COMMERCIAL

## 2022-05-14 VITALS
SYSTOLIC BLOOD PRESSURE: 115 MMHG | TEMPERATURE: 98 F | OXYGEN SATURATION: 98 % | HEART RATE: 87 BPM | DIASTOLIC BLOOD PRESSURE: 80 MMHG | RESPIRATION RATE: 18 BRPM

## 2022-05-14 DIAGNOSIS — O20.0 THREATENED ABORTION AFFECTING INTRAUTERINE PREGNANCY: Primary | ICD-10-CM

## 2022-05-14 PROCEDURE — 99284 EMERGENCY DEPT VISIT MOD MDM: CPT | Performed by: EMERGENCY MEDICINE

## 2022-05-14 PROCEDURE — 99283 EMERGENCY DEPT VISIT LOW MDM: CPT

## 2022-05-14 NOTE — ED PROVIDER NOTES
History  Chief Complaint   Patient presents with    Pregnancy Problem     12 weeks pregnant, cramps and bleeding this morning  The patient reports that she had a blood clot passed vaginally this morning as well as stringy mucus he substance  She tells me she has occasional very mild crampy pain  She does not have any pain now  She called her OBGYN who advised they could not see her until Monday but she to the emergency department for "reassurance    She is known Rh positive  She had threatened  3 weeks ago and had ultrasound performed at that time  Her progesterone was changed by her OBGYN at that time  She does have confirmed IUP  She is   No fevers or chills  No massive amount of bleeding  She just just went to the bathroom in the emergency department in middle of my evaluation and had no recurrent bleeding  She otherwise feels well with no complaints  No radiation of pain  Symptoms are described as mild but anxiety provoking  Prior to Admission Medications   Prescriptions Last Dose Informant Patient Reported? Taking? ESTROGENS CONJUGATED PO Not Taking at Unknown time  Yes No   Sig: Take by mouth   Patient not taking: Reported on 2022   progesterone (ENDOMETRIN) 100 MG vaginal insert Not Taking at Unknown time  Yes No   Sig: Insert 100 mg into the vagina daily   Patient not taking: Reported on 2022      Facility-Administered Medications: None       History reviewed  No pertinent past medical history      Past Surgical History:   Procedure Laterality Date     SECTION      TONSILLECTOMY         Family History   Problem Relation Age of Onset    Thyroid disease Mother     Diabetes type II Mother     Asthma Father     Breast cancer Maternal Grandmother     Heart disease Maternal Grandmother     Hypertension Maternal Grandmother     Heart disease Maternal Grandfather     Hypertension Maternal Grandfather     Thyroid cancer Paternal Grandmother      I have reviewed and agree with the history as documented  E-Cigarette/Vaping    E-Cigarette Use Never User      E-Cigarette/Vaping Substances    Nicotine No     THC No     CBD No     Flavoring No     Other No     Unknown No      Social History     Tobacco Use    Smoking status: Never Smoker    Smokeless tobacco: Never Used   Vaping Use    Vaping Use: Never used   Substance Use Topics    Alcohol use: No    Drug use: No       Review of Systems   All other systems reviewed and are negative  Physical Exam  Physical Exam  Vitals and nursing note reviewed  Constitutional:       General: She is not in acute distress  Appearance: She is well-developed  HENT:      Head: Normocephalic and atraumatic  Eyes:      Conjunctiva/sclera: Conjunctivae normal    Cardiovascular:      Rate and Rhythm: Normal rate and regular rhythm  Heart sounds: No murmur heard  Pulmonary:      Effort: Pulmonary effort is normal  No respiratory distress  Breath sounds: Normal breath sounds  Abdominal:      Palpations: Abdomen is soft  Tenderness: There is no abdominal tenderness  Musculoskeletal:      Cervical back: Neck supple  Skin:     General: Skin is warm and dry  Capillary Refill: Capillary refill takes 2 to 3 seconds  Neurological:      General: No focal deficit present  Mental Status: She is alert     Psychiatric:         Mood and Affect: Mood normal          Vital Signs  ED Triage Vitals [05/14/22 0913]   Temperature Pulse Respirations Blood Pressure SpO2   98 °F (36 7 °C) 87 18 115/80 98 %      Temp Source Heart Rate Source Patient Position - Orthostatic VS BP Location FiO2 (%)   Oral Monitor -- -- --      Pain Score       5           Vitals:    05/14/22 0913   BP: 115/80   Pulse: 87         Visual Acuity      ED Medications  Medications - No data to display    Diagnostic Studies  Results Reviewed     None                 No orders to display              Procedures  Procedures ED Course  ED Course as of 22 0940   Sat May 14, 2022   0932 I performed bedside ultrasound demonstrating fetal activity and fetal heart tones 150s  Patient has confirmed IUP on prior ultrasound  She already has OBGYN follow-up on Monday  She has confirmed Rh positive based on prior testing  She is stable for discharge at this time  [facility]  Number of Diagnoses or Management Options  Threatened  affecting intrauterine pregnancy  Diagnosis management comments: I evaluate the patient  Will perform bedside ultrasound  Patient already has confirmed IUP and is in no distress at this current time without heavy bleeding  Amount and/or Complexity of Data Reviewed  Review and summarize past medical records: yes  Independent visualization of images, tracings, or specimens: yes        Disposition  Final diagnoses:   Threatened  affecting intrauterine pregnancy     Time reflects when diagnosis was documented in both MDM as applicable and the Disposition within this note     Time User Action Codes Description Comment    2022  9:32 AM Evin Obrien Add [O20 0] Threatened  affecting intrauterine pregnancy       ED Disposition     ED Disposition   Discharge    Condition   Stable    Date/Time   Sat May 14, 2022  9:32 AM    Comment   Julius Hilton Dkhissi discharge to home/self care  Follow-up Information     Follow up With Specialties Details Why Contact Info    Your ob/gyn  On 2022            Patient's Medications   Discharge Prescriptions    No medications on file       No discharge procedures on file      PDMP Review     None          ED Provider  Electronically Signed by           Vanda Power MD  22 [id number]

## 2022-05-24 ENCOUNTER — TELEPHONE (OUTPATIENT)
Dept: OBGYN CLINIC | Facility: CLINIC | Age: 27
End: 2022-05-24

## 2022-05-24 NOTE — TELEPHONE ENCOUNTER
[facility]   called the patient and left a vmom to rtc for an appointment with Dr Griselda Albe

## 2022-06-26 ENCOUNTER — HOSPITAL ENCOUNTER (EMERGENCY)
Facility: HOSPITAL | Age: 27
Discharge: HOME | End: 2022-06-26
Attending: EMERGENCY MEDICINE
Payer: COMMERCIAL

## 2022-06-26 VITALS
DIASTOLIC BLOOD PRESSURE: 67 MMHG | BODY MASS INDEX: 33.35 KG/M2 | HEART RATE: 95 BPM | TEMPERATURE: 98.3 F | SYSTOLIC BLOOD PRESSURE: 109 MMHG | RESPIRATION RATE: 16 BRPM | WEIGHT: 182.32 LBS | OXYGEN SATURATION: 97 %

## 2022-06-26 DIAGNOSIS — N94.9 ROUND LIGAMENT PAIN: ICD-10-CM

## 2022-06-26 DIAGNOSIS — Z34.90 PREGNANCY: ICD-10-CM

## 2022-06-26 DIAGNOSIS — R10.9 ABDOMINAL PAIN: Primary | ICD-10-CM

## 2022-06-26 LAB
BILIRUB UR QL STRIP: NEGATIVE
CLARITY UR: CLEAR
COLOR UR: YELLOW
GLUCOSE UR STRIP-MCNC: NEGATIVE MG/DL
HGB UR QL STRIP.AUTO: NEGATIVE
KETONES UR STRIP-MCNC: NEGATIVE MG/DL
LEUKOCYTE ESTERASE UR QL STRIP: NEGATIVE
NITRITE UR QL STRIP: NEGATIVE
PH UR STRIP.AUTO: 7 [PH]
PROT UR STRIP-MCNC: NEGATIVE MG/DL
SP GR UR STRIP.AUTO: 1.01 (ref 1–1.03)
UROBILINOGEN UR QL STRIP.AUTO: 0.2 E.U./DL

## 2022-06-26 PROCEDURE — 99284 EMERGENCY DEPT VISIT MOD MDM: CPT

## 2022-06-26 PROCEDURE — 99285 EMERGENCY DEPT VISIT HI MDM: CPT | Performed by: EMERGENCY MEDICINE

## 2022-06-26 PROCEDURE — 81003 URINALYSIS AUTO W/O SCOPE: CPT | Performed by: EMERGENCY MEDICINE

## 2022-06-26 NOTE — ED PROVIDER NOTES
History  Chief Complaint   Patient presents with    Abdominal Pain Pregnant     States yesterday while at the park got pain in bilat groin with pain radiating down front and back of leg with tingling in foot, started up again today, about 18 weeks pregnant, no bleeding or discharge   [id number]     26-year-old female presents with right lower abdominal pain radiating down her right leg with cramping of her leg on and off since yesterday  No direct trauma noted  She is currently 18 weeks pregnant no denies any vaginal bleeding or spotting no dysuria urgency frequency no nausea vomiting or any other symptoms  History provided by:  Patient   used: No        Prior to Admission Medications   Prescriptions Last Dose Informant Patient Reported? Taking? ESTROGENS CONJUGATED PO   Yes No   Sig: Take by mouth   Patient not taking: Reported on 2022   Prenatal Vit-Fe Fumarate-FA (PRENATAL VITAMIN PO)   Yes Yes   Sig: Take by mouth in the morning   progesterone (ENDOMETRIN) 100 MG vaginal insert   Yes No   Sig: Insert 100 mg into the vagina daily   Patient not taking: Reported on 2022      Facility-Administered Medications: None       History reviewed  No pertinent past medical history  Past Surgical History:   Procedure Laterality Date     SECTION      TONSILLECTOMY         Family History   Problem Relation Age of Onset    Thyroid disease Mother     Diabetes type II Mother     Asthma Father     Breast cancer Maternal Grandmother     Heart disease Maternal Grandmother     Hypertension Maternal Grandmother     Heart disease Maternal Grandfather     Hypertension Maternal Grandfather     Thyroid cancer Paternal Grandmother      I have reviewed and agree with the history as documented      E-Cigarette/Vaping    E-Cigarette Use Never User      E-Cigarette/Vaping Substances    Nicotine No     THC No     CBD No     Flavoring No     Other No     Unknown No      Social History Tobacco Use    Smoking status: Never Smoker    Smokeless tobacco: Never Used   Vaping Use    Vaping Use: Never used   Substance Use Topics    Alcohol use: No    Drug use: No       Review of Systems   Constitutional: Negative  HENT: Negative  Eyes: Negative  Respiratory: Negative  Cardiovascular: Negative  Gastrointestinal: Positive for abdominal pain  Endocrine: Negative  Genitourinary: Negative  Musculoskeletal: Positive for arthralgias and myalgias  Skin: Negative  Allergic/Immunologic: Negative  Neurological: Negative  Hematological: Negative  Psychiatric/Behavioral: Negative  All other systems reviewed and are negative  Physical Exam  Physical Exam  Constitutional:       Appearance: Normal appearance  HENT:      Head: Normocephalic and atraumatic  Nose: Nose normal       Mouth/Throat:      Mouth: Mucous membranes are moist    Eyes:      Extraocular Movements: Extraocular movements intact  Pupils: Pupils are equal, round, and reactive to light  Cardiovascular:      Rate and Rhythm: Normal rate and regular rhythm  Pulmonary:      Effort: Pulmonary effort is normal       Breath sounds: Normal breath sounds  Abdominal:      General: Abdomen is flat  Bowel sounds are normal       Palpations: Abdomen is soft  Comments: No abdominal tenderness noted on palpation  Musculoskeletal:         General: Normal range of motion  Cervical back: Normal range of motion and neck supple  Comments: Right lower leg slight edema noted positive DP and PT pulses intact range of motion at the hip and knee joint intact  Skin:     General: Skin is warm  Capillary Refill: Capillary refill takes less than 2 seconds  Neurological:      General: No focal deficit present  Mental Status: She is alert and oriented to person, place, and time  Mental status is at baseline     Psychiatric:         Mood and Affect: Mood normal          Thought Content: Thought content normal          Vital Signs  ED Triage Vitals [06/26/22 1921]   Temperature Pulse Respirations Blood Pressure SpO2   98 3 °F (36 8 °C) 95 16 109/67 97 %      Temp Source Heart Rate Source Patient Position - Orthostatic VS BP Location FiO2 (%)   Tympanic Monitor Sitting Right arm --      Pain Score       5           Vitals:    06/26/22 1921   BP: 109/67   Pulse: 95   Patient Position - Orthostatic VS: Sitting         Visual Acuity      ED Medications  Medications - No data to display    Diagnostic Studies  Results Reviewed     Procedure Component Value Units Date/Time    UA (URINE) with reflex to Scope [[id number]] Collected: 06/26/22 1959    Lab Status: Final result Specimen: Urine, Clean Catch Updated: 06/26/22 2004     Color, UA Yellow     Clarity, UA Clear     Specific Gravity, UA 1 010     pH, UA 7 0     Leukocytes, UA Negative     Nitrite, UA Negative     Protein, UA Negative mg/dl      Glucose, UA Negative mg/dl      Ketones, UA Negative mg/dl      Urobilinogen, UA 0 2 E U /dl      Bilirubin, UA Negative     Occult Blood, UA Negative                 VAS lower limb venous duplex study, unilateral/limited    (Results Pending)   VAS lower limb venous duplex study, unilateral/limited    (Results Pending)              Procedures  Procedures         ED Course                               SBIRT 20yo+    Flowsheet Row Most Recent Value   SBIRT (25 yo +)    In order to provide better care to our patients, we are screening all of our patients for alcohol and drug use  Would it be okay to ask you these screening questions? No Filed at: 06/26/2022 2006                    MDM  Number of Diagnoses or Management Options  Diagnosis management comments: Fetal heart tones at 150 beats per minute noted  I counseled the patient on watching out for any vaginal bleeding or spotting with increasing abdominal pain and as such she should come back to the ED    Urinalysis checked negative for blood or infection at this time  I gave her a vascular ultrasound prescription to be done in the morning to rule out a DVT in her right lower leg  Patient verbalized understanding and no further questions the time of discharge  I did discuss the case on the phone with Dr To Read the [facility] and she agreed with the plan probably the pain due to round ligament pain because expansion of the uterus causing nerve impingement at this time  Amount and/or Complexity of Data Reviewed  Clinical lab tests: ordered and reviewed  Tests in the medicine section of CPT®: ordered and reviewed    Patient Progress  Patient progress: stable      Disposition  Final diagnoses:   Abdominal pain   Pregnancy   Round ligament pain     Time reflects when diagnosis was documented in both MDM as applicable and the Disposition within this note     Time User Action Codes Description Comment    6/26/2022  8:34 PM Anepu, Nina Public Add [R10 9] Abdominal pain     6/26/2022  8:34 PM Anepu, Kortney Add [Z34 90] Pregnancy     6/26/2022  8:34 PM Anepu, Nina Public Add [N94 9] Round ligament pain       ED Disposition     ED Disposition   Discharge    Condition   Stable    Date/Time   Sun Jun 26, 2022  8:34 PM    Comment   Nicolas Santos discharge to home/self care                 Follow-up Information     Follow up With Specialties Details Why Contact Info Additional Information    [place],  Family Medicine Schedule an appointment as soon as possible for a visit   [address] [address] [place] [address] Emergency Department Emergency Medicine  If symptoms worsen [facility]  [address] [address] Emergency Department, [facility], [id number]          Patient's Medications   Discharge Prescriptions    No medications on file       Outpatient Discharge Orders   VAS lower limb venous duplex study, unilateral/limited   Standing Status: Future Standing Exp   Date: 06/26/26       PDMP Review     None          ED Provider  Electronically Signed by           Mariangel Perez DO  06/26/22 2031

## 2022-06-27 NOTE — DISCHARGE INSTRUCTIONS
-please get a vascular ultrasound done in the morning tomorrow   -please take Tylenol for the pain if you have worsening abdominal pain or vaginal bleeding please return to the ED  Follow-up with your OBGYN call the clinic tomorrow

## 2022-07-06 ENCOUNTER — HOSPITAL ENCOUNTER (EMERGENCY)
Facility: HOSPITAL | Age: 27
Discharge: HOME | End: 2022-07-06
Attending: EMERGENCY MEDICINE | Admitting: EMERGENCY MEDICINE
Payer: COMMERCIAL

## 2022-07-06 VITALS
WEIGHT: 180 LBS | OXYGEN SATURATION: 96 % | DIASTOLIC BLOOD PRESSURE: 74 MMHG | HEART RATE: 99 BPM | BODY MASS INDEX: 33.13 KG/M2 | SYSTOLIC BLOOD PRESSURE: 110 MMHG | HEIGHT: 62 IN | RESPIRATION RATE: 16 BRPM | TEMPERATURE: 98.4 F

## 2022-07-06 DIAGNOSIS — O26.899 ABDOMINAL PAIN IN PREGNANCY: Primary | ICD-10-CM

## 2022-07-06 DIAGNOSIS — Z34.92 SECOND TRIMESTER PREGNANCY: ICD-10-CM

## 2022-07-06 DIAGNOSIS — R10.9 ABDOMINAL PAIN IN PREGNANCY: Primary | ICD-10-CM

## 2022-07-06 LAB
ALBUMIN SERPL BCP-MCNC: 3.4 G/DL (ref 3.5–5)
ALP SERPL-CCNC: 53 U/L (ref 34–104)
ALT SERPL W P-5'-P-CCNC: 18 U/L (ref 7–52)
ANION GAP SERPL CALCULATED.3IONS-SCNC: 8 MMOL/L (ref 4–13)
AST SERPL W P-5'-P-CCNC: 16 U/L (ref 13–39)
BASOPHILS # BLD AUTO: 0.03 THOUSANDS/ΜL (ref 0–0.1)
BASOPHILS NFR BLD AUTO: 0 % (ref 0–1)
BILIRUB SERPL-MCNC: 0.2 MG/DL (ref 0.2–1)
BUN SERPL-MCNC: 7 MG/DL (ref 5–25)
CALCIUM ALBUM COR SERPL-MCNC: 9.4 MG/DL (ref 8.3–10.1)
CALCIUM SERPL-MCNC: 8.9 MG/DL (ref 8.4–10.2)
CHLORIDE SERPL-SCNC: 100 MMOL/L (ref 96–108)
CO2 SERPL-SCNC: 24 MMOL/L (ref 21–32)
CREAT SERPL-MCNC: 0.8 MG/DL (ref 0.6–1.3)
EOSINOPHIL # BLD AUTO: 0.1 THOUSAND/ΜL (ref 0–0.61)
EOSINOPHIL NFR BLD AUTO: 1 % (ref 0–6)
ERYTHROCYTE [DISTWIDTH] IN BLOOD BY AUTOMATED COUNT: 12.7 % (ref 11.6–15.1)
GFR SERPL CREATININE-BSD FRML MDRD: 100 ML/MIN/1.73SQ M
GLUCOSE SERPL-MCNC: 100 MG/DL (ref 65–140)
HCT VFR BLD AUTO: 30.4 % (ref 34.8–46.1)
HGB BLD-MCNC: 10.9 G/DL (ref 11.5–15.4)
IMM GRANULOCYTES # BLD AUTO: 0.07 THOUSAND/UL (ref 0–0.2)
IMM GRANULOCYTES NFR BLD AUTO: 1 % (ref 0–2)
LIPASE SERPL-CCNC: 24 U/L (ref 11–82)
LYMPHOCYTES # BLD AUTO: 2.21 THOUSANDS/ΜL (ref 0.6–4.47)
LYMPHOCYTES NFR BLD AUTO: 20 % (ref 14–44)
MCH RBC QN AUTO: 31.1 PG (ref 26.8–34.3)
MCHC RBC AUTO-ENTMCNC: 35.9 G/DL (ref 31.4–37.4)
MCV RBC AUTO: 87 FL (ref 82–98)
MONOCYTES # BLD AUTO: 0.67 THOUSAND/ΜL (ref 0.17–1.22)
MONOCYTES NFR BLD AUTO: 6 % (ref 4–12)
NEUTROPHILS # BLD AUTO: 8.01 THOUSANDS/ΜL (ref 1.85–7.62)
NEUTS SEG NFR BLD AUTO: 72 % (ref 43–75)
NRBC BLD AUTO-RTO: 0 /100 WBCS
PLATELET # BLD AUTO: 220 THOUSANDS/UL (ref 149–390)
PMV BLD AUTO: 10.5 FL (ref 8.9–12.7)
POTASSIUM SERPL-SCNC: 3.5 MMOL/L (ref 3.5–5.3)
PROT SERPL-MCNC: 6.4 G/DL (ref 6.4–8.4)
RBC # BLD AUTO: 3.51 MILLION/UL (ref 3.81–5.12)
SODIUM SERPL-SCNC: 132 MMOL/L (ref 135–147)
WBC # BLD AUTO: 11.09 THOUSAND/UL (ref 4.31–10.16)

## 2022-07-06 PROCEDURE — 96360 HYDRATION IV INFUSION INIT: CPT

## 2022-07-06 PROCEDURE — 99284 EMERGENCY DEPT VISIT MOD MDM: CPT | Performed by: STUDENT IN AN ORGANIZED HEALTH CARE EDUCATION/TRAINING PROGRAM

## 2022-07-06 PROCEDURE — 83690 ASSAY OF LIPASE: CPT | Performed by: STUDENT IN AN ORGANIZED HEALTH CARE EDUCATION/TRAINING PROGRAM

## 2022-07-06 PROCEDURE — 36415 COLL VENOUS BLD VENIPUNCTURE: CPT | Performed by: STUDENT IN AN ORGANIZED HEALTH CARE EDUCATION/TRAINING PROGRAM

## 2022-07-06 PROCEDURE — 99284 EMERGENCY DEPT VISIT MOD MDM: CPT

## 2022-07-06 PROCEDURE — 80053 COMPREHEN METABOLIC PANEL: CPT | Performed by: STUDENT IN AN ORGANIZED HEALTH CARE EDUCATION/TRAINING PROGRAM

## 2022-07-06 PROCEDURE — 85025 COMPLETE CBC W/AUTO DIFF WBC: CPT | Performed by: STUDENT IN AN ORGANIZED HEALTH CARE EDUCATION/TRAINING PROGRAM

## 2022-07-06 RX ORDER — ACETAMINOPHEN 325 MG/1
650 TABLET ORAL ONCE
Status: COMPLETED | OUTPATIENT
Start: 2022-07-06 | End: 2022-07-06

## 2022-07-06 RX ADMIN — SODIUM CHLORIDE 1000 ML: 0.9 INJECTION, SOLUTION INTRAVENOUS at 21:56

## 2022-07-06 RX ADMIN — ACETAMINOPHEN 650 MG: 325 TABLET, FILM COATED ORAL at 21:58

## 2022-07-07 NOTE — ED TRIAGE NOTES
Pt presents to the ED from home with complaint of "I feel like something is stretching in here (pointing to upper abdomen) since yesterday; 19 weeks pregnant; called OBGYN who informed her this was normal and they would see her at next scheduled appointment 14 Jul 2022; denies abdominal cramping; denies vaginal discharge and/or pressure;

## 2022-07-07 NOTE — ED PROVIDER NOTES
History  Chief Complaint   Patient presents with    Pregnancy Problem     Pt presents to the ED from home with complaint of "I feel like something is stretching in here (pointing to upper abdomen) since yesterday; 19 weeks pregnant; called OBGYN who informed her this was normal and they would see her at next scheduled appointment 2022; denies abdominal cramping; denies vaginal discharge and/or pressure     Patient is a 62-year-old female, , currently approximately 19 weeks pregnant presents emergency department today with concern for pain in her abdomen x1 day  She states her pain began yesterday and describes as a stretching and occasionally sharp sensation that is worse with lying on either side and when sitting upright, rated 7/10 at its worst   States she has not taken any medication for pain at this time  She also admits to 1 episode of vomiting this morning but denies other complaints of nausea vomiting  Denies any recent injuries or trauma  Denies fevers or chills, pelvic pain, vaginal bleeding or discharge, dysuria, increased frequency urgency, flank pain, constipation or diarrhea, lightheadedness or dizziness, headaches, numbness or tingling extremities, chest pain or shortness of breath  She states she contacted her OBGYN and was informed that this can often be a normal finding and that they will see her on her scheduled appointment on 2022  Prior to Admission Medications   Prescriptions Last Dose Informant Patient Reported? Taking?    ESTROGENS CONJUGATED PO Not Taking at Unknown time  Yes No   Sig: Take by mouth   Patient not taking: No sig reported   Prenatal Vit-Fe Fumarate-FA (PRENATAL VITAMIN PO) 2022 at Unknown time  Yes Yes   Sig: Take by mouth in the morning   progesterone (ENDOMETRIN) 100 MG vaginal insert Not Taking at Unknown time  Yes No   Sig: Insert 100 mg into the vagina daily   Patient not taking: No sig reported      Facility-Administered Medications: None       History reviewed  No pertinent past medical history  Past Surgical History:   Procedure Laterality Date     SECTION      TONSILLECTOMY         Family History   Problem Relation Age of Onset    Thyroid disease Mother     Diabetes type II Mother     Asthma Father     Breast cancer Maternal Grandmother     Heart disease Maternal Grandmother     Hypertension Maternal Grandmother     Heart disease Maternal Grandfather     Hypertension Maternal Grandfather     Thyroid cancer Paternal Grandmother      I have reviewed and agree with the history as documented  E-Cigarette/Vaping    E-Cigarette Use Never User      E-Cigarette/Vaping Substances    Nicotine No     THC No     CBD No     Flavoring No     Other No     Unknown No      Social History     Tobacco Use    Smoking status: Never Smoker    Smokeless tobacco: Never Used   Vaping Use    Vaping Use: Never used   Substance Use Topics    Alcohol use: No    Drug use: No       Review of Systems   Constitutional: Negative for chills and fever  HENT: Negative for congestion, rhinorrhea and sore throat  Respiratory: Negative for cough, chest tightness and shortness of breath  Cardiovascular: Negative for chest pain  Gastrointestinal: Positive for abdominal pain and vomiting  Negative for diarrhea and nausea  Genitourinary: Negative for dysuria, flank pain, frequency, hematuria, pelvic pain, urgency, vaginal bleeding, vaginal discharge and vaginal pain  Musculoskeletal: Negative for back pain and neck pain  Neurological: Negative for dizziness, weakness, light-headedness, numbness and headaches  All other systems reviewed and are negative  Physical Exam  Physical Exam  Vitals and nursing note reviewed  Constitutional:       General: She is not in acute distress  Appearance: Normal appearance  She is well-developed  She is not ill-appearing  HENT:      Head: Normocephalic and atraumatic  Mouth/Throat:      Mouth: Mucous membranes are moist       Pharynx: Oropharynx is clear  Eyes:      Extraocular Movements: Extraocular movements intact  Conjunctiva/sclera: Conjunctivae normal       Pupils: Pupils are equal, round, and reactive to light  Cardiovascular:      Rate and Rhythm: Normal rate and regular rhythm  Heart sounds: Normal heart sounds  No murmur heard  Pulmonary:      Effort: Pulmonary effort is normal  No respiratory distress  Breath sounds: Normal breath sounds  No decreased breath sounds, wheezing or rales  Abdominal:      General: Bowel sounds are normal       Palpations: Abdomen is soft  Tenderness: There is abdominal tenderness in the periumbilical area  There is no right CVA tenderness, left CVA tenderness, guarding or rebound  Hernia: No hernia is present  Musculoskeletal:      Cervical back: Neck supple  No pain with movement, spinous process tenderness or muscular tenderness  Right lower leg: No edema  Left lower leg: No edema  Lymphadenopathy:      Cervical: No cervical adenopathy  Skin:     General: Skin is warm and dry  Neurological:      General: No focal deficit present  Mental Status: She is alert and oriented to person, place, and time  Psychiatric:         Attention and Perception: Attention normal          Mood and Affect: Mood normal          Speech: Speech normal          Behavior: Behavior normal  Behavior is cooperative  Thought Content:  Thought content normal          Judgment: Judgment normal          Vital Signs  ED Triage Vitals [07/06/22 2117]   Temperature Pulse Respirations Blood Pressure SpO2   98 4 °F (36 9 °C) 99 16 110/74 96 %      Temp Source Heart Rate Source Patient Position - Orthostatic VS BP Location FiO2 (%)   Oral Monitor Sitting Left arm --      Pain Score       7           Vitals:    07/06/22 2117   BP: 110/74   Pulse: 99   Patient Position - Orthostatic VS: Sitting         Visual Acuity      ED Medications  Medications   sodium chloride 0 9 % bolus 1,000 mL (0 mL Intravenous Stopped 7/6/22 2240)   acetaminophen (TYLENOL) tablet 650 mg (650 mg Oral Given 7/6/22 2158)       Diagnostic Studies  Results Reviewed     Procedure Component Value Units Date/Time    Comprehensive metabolic panel [[id number]]  (Abnormal) Collected: 07/06/22 2154    Lab Status: Final result Specimen: Blood from Arm, Right Updated: 07/06/22 2223     Sodium 132 mmol/L      Potassium 3 5 mmol/L      Chloride 100 mmol/L      CO2 24 mmol/L      ANION GAP 8 mmol/L      BUN 7 mg/dL      Creatinine 0 80 mg/dL      Glucose 100 mg/dL      Calcium 8 9 mg/dL      Corrected Calcium 9 4 mg/dL      AST 16 U/L      ALT 18 U/L      Alkaline Phosphatase 53 U/L      Total Protein 6 4 g/dL      Albumin 3 4 g/dL      Total Bilirubin 0 20 mg/dL      eGFR 100 ml/min/1 73sq m     Narrative:      National Kidney Disease Foundation guidelines for Chronic Kidney Disease (CKD):     Stage 1 with normal or high GFR (GFR > 90 mL/min/1 73 square meters)    Stage 2 Mild CKD (GFR = 60-89 mL/min/1 73 square meters)    Stage 3A Moderate CKD (GFR = 45-59 mL/min/1 73 square meters)    Stage 3B Moderate CKD (GFR = 30-44 mL/min/1 73 square meters)    Stage 4 Severe CKD (GFR = 15-29 mL/min/1 73 square meters)    Stage 5 End Stage CKD (GFR <15 mL/min/1 73 square meters)  Note: GFR calculation is accurate only with a steady state creatinine    Lipase [[id number]]  (Normal) Collected: 07/06/22 2154    Lab Status: Final result Specimen: Blood from Arm, Right Updated: 07/06/22 2223     Lipase 24 u/L     CBC and differential [[id number]]  (Abnormal) Collected: 07/06/22 2154    Lab Status: Final result Specimen: Blood from Arm, Right Updated: 07/06/22 2211     WBC 11 09 Thousand/uL      RBC 3 51 Million/uL      Hemoglobin 10 9 g/dL      Hematocrit 30 4 %      MCV 87 fL      MCH 31 1 pg      MCHC 35 9 g/dL      RDW 12 7 %      MPV 10 5 fL      Platelets 538 Thousands/uL      nRBC 0 /100 WBCs      Neutrophils Relative 72 %      Immat GRANS % 1 %      Lymphocytes Relative 20 %      Monocytes Relative 6 %      Eosinophils Relative 1 %      Basophils Relative 0 %      Neutrophils Absolute 8 01 Thousands/µL      Immature Grans Absolute 0 07 Thousand/uL      Lymphocytes Absolute 2 21 Thousands/µL      Monocytes Absolute 0 67 Thousand/µL      Eosinophils Absolute 0 10 Thousand/µL      Basophils Absolute 0 03 Thousands/µL                  No orders to display              Procedures  Procedures         ED Course                               SBIRT 22yo+    Flowsheet Row Most Recent Value   SBIRT (23 yo +)    In order to provide better care to our patients, we are screening all of our patients for alcohol and drug use  Would it be okay to ask you these screening questions? Yes Filed at: 2022   Initial Alcohol Screen: US AUDIT-C     1  How often do you have a drink containing alcohol? 0 Filed at: 2022   2  How many drinks containing alcohol do you have on a typical day you are drinking? 0 Filed at: 2022   3b  FEMALE Any Age, or MALE 65+: How often do you have 4 or more drinks on one occassion? 0 Filed at: 2022   Audit-C Score 0 Filed at: 2022   ARI: How many times in the past year have you    Used an illegal drug or used a prescription medication for non-medical reasons? Never Filed at: 2022                    MDM  Number of Diagnoses or Management Options  Abdominal pain in pregnancy  Second trimester pregnancy  Diagnosis management comments: Patient is a 18-year-old female presents emergency department today, , currently 19 weeks pregnant with concern for abdominal pain times day  Examination showed minimal tenderness in the periumbilical region but was otherwise unremarkable, no rebound or guarding, no pelvic pain or urinary symptoms    Patient's pain is worse with sudden movements and when lying on her sides, worse on the left, but improved with lying on her back  CBC, CMP, lipase were ordered to rule out hepatobiliary or renal abnormalities  CBC findings were consistent with leukocytosis at approximately 11 and slight anemia with a hemoglobin of 10 9 which is slightly lower patient's prior labs proximally 2 months ago  CMP showed slight hyponatremia 132, patient was given 1 L of normal saline via IV  She is medicated with Tylenol for pain  On re-evaluation patient states pain improved with findings of minimal tenderness to the abdomen on re-examination  Patient complaints of pelvic pain, urinary symptoms, vaginal bleeding or discharge  Suspect patient's pain is likely due to normal physiologic processes associated with pregnancy and possibly associated with pain of the round ligament  Patient was discharged home advised to continue Tylenol every 6 hours as needed for pain relief to follow-up with her OBGYN as soon as able for re-evaluation  Patient was given prompt return precautions for the emergency department with any new or worsening symptoms as discussed  Patient verbalized understanding agreed to plan care, all patient's questions were answered, patient was stable time of discharge         Amount and/or Complexity of Data Reviewed  Clinical lab tests: ordered and reviewed    Patient Progress  Patient progress: stable      Disposition  Final diagnoses:   Abdominal pain in pregnancy   Second trimester pregnancy     Time reflects when diagnosis was documented in both MDM as applicable and the Disposition within this note     Time User Action Codes Description Comment    7/6/2022 10:40 PM Mateus Colón [O26 899,  R10 9] Abdominal pain in pregnancy     7/6/2022 10:41 PM Ian Palacios [Z34 92] Second trimester pregnancy       ED Disposition     ED Disposition   Discharge    Condition   Stable    Date/Time   Wed Jul 6, 2022 10:40 PM    Comment   Xiao Poon discharge to home/self care                Follow-up Information     Follow up With Specialties Details Why Contact Info Additional [address] [facility]etrics [facility]ne[facility]   [place]  [facility] [phone]  [address], [place], [place], [phone],     Michelle Matthews,  Family Medicine   [address] [facility]   [address] [phone]       [address] Emergency Department Emergency Medicine   [address] [facility],[address] [id number] [place] [address] Emergency Department, [address]          Discharge Medication List as of 7/6/2022 10:43 PM      CONTINUE these medications which have NOT CHANGED    Details   Prenatal Vit-Fe Fumarate-FA (PRENATAL VITAMIN PO) Take by mouth in the morning, Historical Med      ESTROGENS CONJUGATED PO Take by mouth, Historical Med      progesterone (ENDOMETRIN) 100 MG vaginal insert Insert 100 mg into the vagina daily, Historical Med             No discharge procedures on file      PDMP Review     None          ED Provider  Electronically Signed by           Bro Borrego PA-C  07/07/22 4414

## 2022-07-07 NOTE — DISCHARGE INSTRUCTIONS
May continue Tylenol every 6 hours as needed for pain  Contact your OBGYN within the next 1-2 days to schedule appointment for re-evaluation  Follow-up with primary care provider as needed  Return emergency department any new or worsening symptoms including development of fevers, severe worsening abdominal pain, severe nausea vomiting, chest pain or shortness of breath, lightheadedness or dizziness, vaginal bleeding or discharge, rapid heart rate

## 2022-07-17 ENCOUNTER — HOSPITAL ENCOUNTER (EMERGENCY)
Facility: HOSPITAL | Age: 27
Discharge: HOME | End: 2022-07-18
Attending: EMERGENCY MEDICINE
Payer: COMMERCIAL

## 2022-07-17 VITALS
TEMPERATURE: 98 F | HEART RATE: 92 BPM | OXYGEN SATURATION: 99 % | SYSTOLIC BLOOD PRESSURE: 111 MMHG | HEIGHT: 62 IN | DIASTOLIC BLOOD PRESSURE: 70 MMHG | BODY MASS INDEX: 33.31 KG/M2 | RESPIRATION RATE: 20 BRPM | WEIGHT: 181 LBS

## 2022-07-17 DIAGNOSIS — E83.42 HYPOMAGNESEMIA: ICD-10-CM

## 2022-07-17 DIAGNOSIS — R00.2 PALPITATIONS: Primary | ICD-10-CM

## 2022-07-17 LAB
ALBUMIN SERPL BCP-MCNC: 3.3 G/DL (ref 3.5–5)
ALP SERPL-CCNC: 60 U/L (ref 34–104)
ALT SERPL W P-5'-P-CCNC: 21 U/L (ref 7–52)
ANION GAP SERPL CALCULATED.3IONS-SCNC: 9 MMOL/L (ref 4–13)
AST SERPL W P-5'-P-CCNC: 17 U/L (ref 13–39)
BASOPHILS # BLD AUTO: 0.04 THOUSANDS/ΜL (ref 0–0.1)
BASOPHILS NFR BLD AUTO: 0 % (ref 0–1)
BILIRUB SERPL-MCNC: 0.18 MG/DL (ref 0.2–1)
BUN SERPL-MCNC: 9 MG/DL (ref 5–25)
CALCIUM ALBUM COR SERPL-MCNC: 9.5 MG/DL (ref 8.3–10.1)
CALCIUM SERPL-MCNC: 8.9 MG/DL (ref 8.4–10.2)
CARDIAC TROPONIN I PNL SERPL HS: <2 NG/L
CHLORIDE SERPL-SCNC: 104 MMOL/L (ref 96–108)
CO2 SERPL-SCNC: 21 MMOL/L (ref 21–32)
CREAT SERPL-MCNC: 0.41 MG/DL (ref 0.6–1.3)
EOSINOPHIL # BLD AUTO: 0.11 THOUSAND/ΜL (ref 0–0.61)
EOSINOPHIL NFR BLD AUTO: 1 % (ref 0–6)
ERYTHROCYTE [DISTWIDTH] IN BLOOD BY AUTOMATED COUNT: 13.1 % (ref 11.6–15.1)
GFR SERPL CREATININE-BSD FRML MDRD: 141 ML/MIN/1.73SQ M
GLUCOSE SERPL-MCNC: 115 MG/DL (ref 65–140)
HCT VFR BLD AUTO: 31.5 % (ref 34.8–46.1)
HGB BLD-MCNC: 11.2 G/DL (ref 11.5–15.4)
IMM GRANULOCYTES # BLD AUTO: 0.07 THOUSAND/UL (ref 0–0.2)
IMM GRANULOCYTES NFR BLD AUTO: 1 % (ref 0–2)
LYMPHOCYTES # BLD AUTO: 2.44 THOUSANDS/ΜL (ref 0.6–4.47)
LYMPHOCYTES NFR BLD AUTO: 22 % (ref 14–44)
MCH RBC QN AUTO: 30.9 PG (ref 26.8–34.3)
MCHC RBC AUTO-ENTMCNC: 35.6 G/DL (ref 31.4–37.4)
MCV RBC AUTO: 87 FL (ref 82–98)
MONOCYTES # BLD AUTO: 0.66 THOUSAND/ΜL (ref 0.17–1.22)
MONOCYTES NFR BLD AUTO: 6 % (ref 4–12)
NEUTROPHILS # BLD AUTO: 7.78 THOUSANDS/ΜL (ref 1.85–7.62)
NEUTS SEG NFR BLD AUTO: 70 % (ref 43–75)
NRBC BLD AUTO-RTO: 0 /100 WBCS
PLATELET # BLD AUTO: 220 THOUSANDS/UL (ref 149–390)
PMV BLD AUTO: 10.2 FL (ref 8.9–12.7)
POTASSIUM SERPL-SCNC: 3.5 MMOL/L (ref 3.5–5.3)
PROT SERPL-MCNC: 6.7 G/DL (ref 6.4–8.4)
RBC # BLD AUTO: 3.62 MILLION/UL (ref 3.81–5.12)
SODIUM SERPL-SCNC: 134 MMOL/L (ref 135–147)
WBC # BLD AUTO: 11.1 THOUSAND/UL (ref 4.31–10.16)

## 2022-07-17 PROCEDURE — 83735 ASSAY OF MAGNESIUM: CPT | Performed by: EMERGENCY MEDICINE

## 2022-07-17 PROCEDURE — 99285 EMERGENCY DEPT VISIT HI MDM: CPT | Performed by: EMERGENCY MEDICINE

## 2022-07-17 PROCEDURE — 84443 ASSAY THYROID STIM HORMONE: CPT | Performed by: EMERGENCY MEDICINE

## 2022-07-17 PROCEDURE — 84484 ASSAY OF TROPONIN QUANT: CPT | Performed by: EMERGENCY MEDICINE

## 2022-07-17 PROCEDURE — 85025 COMPLETE CBC W/AUTO DIFF WBC: CPT | Performed by: EMERGENCY MEDICINE

## 2022-07-17 PROCEDURE — 80053 COMPREHEN METABOLIC PANEL: CPT | Performed by: EMERGENCY MEDICINE

## 2022-07-17 PROCEDURE — 84100 ASSAY OF PHOSPHORUS: CPT | Performed by: EMERGENCY MEDICINE

## 2022-07-17 PROCEDURE — 99285 EMERGENCY DEPT VISIT HI MDM: CPT

## 2022-07-17 PROCEDURE — 36415 COLL VENOUS BLD VENIPUNCTURE: CPT

## 2022-07-17 PROCEDURE — 93005 ELECTROCARDIOGRAM TRACING: CPT

## 2022-07-18 LAB
ATRIAL RATE: 104 BPM
MAGNESIUM SERPL-MCNC: 1.7 MG/DL (ref 1.9–2.7)
P AXIS: 61 DEGREES
PHOSPHATE SERPL-MCNC: 3.5 MG/DL (ref 2.7–4.5)
PR INTERVAL: 126 MS
QRS AXIS: 93 DEGREES
QRSD INTERVAL: 82 MS
QT INTERVAL: 316 MS
QTC INTERVAL: 402 MS
T WAVE AXIS: 26 DEGREES
TSH SERPL DL<=0.05 MIU/L-ACNC: 1.58 UIU/ML (ref 0.45–4.5)
VENTRICULAR RATE: 97 BPM

## 2022-07-18 PROCEDURE — 93010 ELECTROCARDIOGRAM REPORT: CPT | Performed by: INTERNAL MEDICINE

## 2022-07-18 RX ORDER — MAGNESIUM CHLORIDE 64 MG
64 TABLET, DELAYED RELEASE (ENTERIC COATED) ORAL ONCE
Status: COMPLETED | OUTPATIENT
Start: 2022-07-18 | End: 2022-07-18

## 2022-07-18 RX ADMIN — MAGNESIUM 64 MG (MAGNESIUM CHLORIDE) TABLET,DELAYED RELEASE 64 MG: at 01:33

## 2022-07-18 NOTE — ED PROVIDER NOTES
History  Chief Complaint   Patient presents with    Palpitations     Pt C/O palpitations starting yesterday  Patient currently 20wks 3 days pregnant +N/V      Patient is a 69-year-old female  currently 20 weeks 3 days pregnant c/o episodes of her heart racing with nausea and vomiting  Patient states that she has been having episodes over the last 2 days where her heart would start racing and felt like she can't breath  Taking these episodes she would feel drowsy and nauseous and had 1 episode of vomiting this morning  The episodes typically last only a couple minutes with her last episode being about 7:00 p m  Patient denies change in diet, chest pain, shortness of breath, fever, chills, abdominal pain, vaginal bleeding or discharge  Patient states she used to take levothyroxine in the past but is dumped back in , continuing with yearly thyroid checks  Patient is currently taking prenatal vitamins and states she has an OB appointment tomorrow  Prior to Admission Medications   Prescriptions Last Dose Informant Patient Reported? Taking? ESTROGENS CONJUGATED PO Not Taking at Unknown time  Yes No   Sig: Take by mouth   Patient not taking: No sig reported   Prenatal Vit-Fe Fumarate-FA (PRENATAL VITAMIN PO)   Yes Yes   Sig: Take by mouth in the morning   progesterone (ENDOMETRIN) 100 MG vaginal insert Not Taking at Unknown time  Yes No   Sig: Insert 100 mg into the vagina daily   Patient not taking: No sig reported      Facility-Administered Medications: None       History reviewed  No pertinent past medical history      Past Surgical History:   Procedure Laterality Date     SECTION      TONSILLECTOMY         Family History   Problem Relation Age of Onset    Thyroid disease Mother     Diabetes type II Mother     Asthma Father     Breast cancer Maternal Grandmother     Heart disease Maternal Grandmother     Hypertension Maternal Grandmother     Heart disease Maternal Grandfather  Hypertension Maternal Grandfather     Thyroid cancer Paternal Grandmother      I have reviewed and agree with the history as documented  E-Cigarette/Vaping    E-Cigarette Use Never User      E-Cigarette/Vaping Substances    Nicotine No     THC No     CBD No     Flavoring No     Other No     Unknown No      Social History     Tobacco Use    Smoking status: Never Smoker    Smokeless tobacco: Never Used   Vaping Use    Vaping Use: Never used   Substance Use Topics    Alcohol use: No    Drug use: No        Review of Systems   Constitutional: Negative for chills and fever  HENT: Negative for ear pain and sore throat  Eyes: Negative for pain and visual disturbance  Respiratory: Negative for cough and shortness of breath  Cardiovascular: Positive for palpitations  Negative for chest pain  Gastrointestinal: Positive for nausea and vomiting  Negative for abdominal pain  Genitourinary: Negative for dysuria, hematuria, vaginal bleeding, vaginal discharge and vaginal pain  Musculoskeletal: Negative for neck pain and neck stiffness  Skin: Negative for rash and wound  Neurological: Positive for light-headedness (during episodes of palpitations)  Negative for seizures, syncope and headaches  All other systems reviewed and are negative        Physical Exam  ED Triage Vitals   Temperature Pulse Respirations Blood Pressure SpO2   07/17/22 2022 07/17/22 2019 07/17/22 2019 07/17/22 2019 07/17/22 2019   98 3 °F (36 8 °C) (!) 107 18 113/67 96 %      Temp Source Heart Rate Source Patient Position - Orthostatic VS BP Location FiO2 (%)   07/17/22 2019 07/17/22 2158 07/17/22 2158 07/17/22 2158 --   Oral Monitor Sitting Right arm       Pain Score       07/17/22 2158       No Pain             Orthostatic Vital Signs  Vitals:    07/17/22 2019 07/17/22 2158 07/17/22 2200 07/17/22 2215   BP: 113/67 116/69 116/69 111/70   Pulse: (!) 107 92 100 92   Patient Position - Orthostatic VS:  Sitting Sitting Sitting Physical Exam  Constitutional:       General: She is not in acute distress  Appearance: Normal appearance  HENT:      Head: Normocephalic and atraumatic  Nose: Nose normal  No congestion  Eyes:      Extraocular Movements: Extraocular movements intact  Conjunctiva/sclera: Conjunctivae normal    Cardiovascular:      Rate and Rhythm: Normal rate and regular rhythm  Pulses: Normal pulses  Heart sounds: Normal heart sounds  Pulmonary:      Effort: Pulmonary effort is normal  No respiratory distress  Breath sounds: Normal breath sounds  Chest:      Chest wall: No tenderness  Abdominal:      General: Bowel sounds are normal       Tenderness: There is no abdominal tenderness  Comments: Gravid abdomen   Musculoskeletal:         General: No swelling, tenderness or signs of injury  Cervical back: Neck supple  No rigidity or tenderness  Skin:     General: Skin is warm and dry  Neurological:      General: No focal deficit present  Mental Status: She is alert  Cranial Nerves: No cranial nerve deficit  Motor: No weakness  ED Medications  Medications   magnesium oxide (MAG-OX) tablet 800 mg (has no administration in time range)       Diagnostic Studies  Results Reviewed     Procedure Component Value Units Date/Time    TSH [[id number]]  (Normal) Collected: 07/17/22 2026    Lab Status: Final result Specimen: Blood from Arm, Right Updated: 07/18/22 0038     TSH 3RD GENERATON 1 585 uIU/mL     Narrative:      Patients undergoing fluorescein dye angiography may retain small amounts of fluorescein in the body for 48-72 hours post procedure  Samples containing fluorescein can produce falsely depressed TSH values  If the patient had this procedure,a specimen should be resubmitted post fluorescein clearance        Magnesium [[id number]]  (Abnormal) Collected: 07/17/22 2026    Lab Status: Final result Specimen: Blood from Arm, Right Updated: 07/18/22 0011 Magnesium 1 7 mg/dL     Phosphorus [[id number]]  (Normal) Collected: 07/17/22 2026    Lab Status: Final result Specimen: Blood from Arm, Right Updated: 07/18/22 0011     Phosphorus 3 5 mg/dL     HS Troponin 0hr (reflex protocol) [[id number]]  (Normal) Collected: 07/17/22 2026    Lab Status: Final result Specimen: Blood from Arm, Right Updated: 07/17/22 2059     hs TnI 0hr <2 ng/L     Comprehensive metabolic panel [[id number]]  (Abnormal) Collected: 07/17/22 2026    Lab Status: Final result Specimen: Blood from Arm, Right Updated: 07/17/22 2053     Sodium 134 mmol/L      Potassium 3 5 mmol/L      Chloride 104 mmol/L      CO2 21 mmol/L      ANION GAP 9 mmol/L      BUN 9 mg/dL      Creatinine 0 41 mg/dL      Glucose 115 mg/dL      Calcium 8 9 mg/dL      Corrected Calcium 9 5 mg/dL      AST 17 U/L      ALT 21 U/L      Alkaline Phosphatase 60 U/L      Total Protein 6 7 g/dL      Albumin 3 3 g/dL      Total Bilirubin 0 18 mg/dL      eGFR 141 ml/min/1 73sq m     Narrative:      [place] guidelines for Chronic Kidney Disease (CKD):     Stage 1 with normal or high GFR (GFR > 90 mL/min/1 73 square meters)    Stage 2 Mild CKD (GFR = 60-89 mL/min/1 73 square meters)    Stage 3A Moderate CKD (GFR = 45-59 mL/min/1 73 square meters)    Stage 3B Moderate CKD (GFR = 30-44 mL/min/1 73 square meters)    Stage 4 Severe CKD (GFR = 15-29 mL/min/1 73 square meters)    Stage 5 End Stage CKD (GFR <15 mL/min/1 73 square meters)  Note: GFR calculation is accurate only with a steady state creatinine    CBC and differential [[id number]]  (Abnormal) Collected: 07/17/22 2026    Lab Status: Final result Specimen: Blood from Arm, Right Updated: 07/17/22 2033     WBC 11 10 Thousand/uL      RBC 3 62 Million/uL      Hemoglobin 11 2 g/dL      Hematocrit 31 5 %      MCV 87 fL      MCH 30 9 pg      MCHC 35 6 g/dL      RDW 13 1 %      MPV 10 2 fL      Platelets 855 Thousands/uL      nRBC 0 /100 WBCs      Neutrophils Relative 70 %      Immat GRANS % 1 %      Lymphocytes Relative 22 %      Monocytes Relative 6 %      Eosinophils Relative 1 %      Basophils Relative 0 %      Neutrophils Absolute 7 78 Thousands/µL      Immature Grans Absolute 0 07 Thousand/uL      Lymphocytes Absolute 2 44 Thousands/µL      Monocytes Absolute 0 66 Thousand/µL      Eosinophils Absolute 0 11 Thousand/µL      Basophils Absolute 0 04 Thousands/µL                  No orders to display         Procedures  ECG 12 Lead Documentation Only    Date/Time: 2022 11:01 PM  Performed by: Tamara Canas MD  Authorized by: Tamara Canas MD     ECG reviewed by me, the ED Provider: yes    Patient location:  ED  Interpretation:     Interpretation: normal    Rate:     ECG rate:  97    ECG rate assessment: normal    Rhythm:     Rhythm: sinus rhythm    Ectopy:     Ectopy: none    QRS:     QRS axis:  Normal  ST segments:     ST segments:  Normal  T waves:     T waves: normal            ED Course  ED Course as of 22 0102      0015 Magnesium(!): 1 7   0015 hs TnI 0hr: <2                             SBIRT 22yo+    Flowsheet Row Most Recent Value   SBIRT (23 yo +)    In order to provide better care to our patients, we are screening all of our patients for alcohol and drug use  Would it be okay to ask you these screening questions? Unable to answer at this time Filed at: 2022 2152                MDM  Number of Diagnoses or Management Options  Diagnosis management comments: Pt is a 29 y/o  female presenting for episodes of palpitations, nausea and vomiting  Ddx- PE, electrolyte abnormality, hyperthyroidism, elevated glucose, infection, ACS    Labs- CBC, CMP, trop, Mg, Phos, TSH    Pt does not want to proceed with any imaging due to pregnancy and due to pt presentation, PE less likely due to lack of tachycardia, SOB, chest pain      EKG unremarkable    Mg- 1 7- Pt given 800 mg PO Magnesium citrate    D/c to home with instruction to f/u with cardiology  Disposition  Final diagnoses:   Palpitations   Hypomagnesemia     Time reflects when diagnosis was documented in both MDM as applicable and the Disposition within this note     Time User Action Codes Description Comment    7/18/2022 12:49 AM Oralia Son Add [R00 2] Palpitations     7/18/2022 12:49 AM Oralia Flanagan Add [E83 42] Hypomagnesemia       ED Disposition     ED Disposition   Discharge    Condition   Stable    Date/Time   Mon Jul 18, 2022 12:49 AM    Comment   Kwadwo Nugent discharge to home/self care  Follow-up Information     Follow up With Specialties Details Why Contact Info Additional Information    [facility] Call in 1 week As needed, If symptoms worsen Ab Guerin [address] [facility] [phone] [facility] [address] [place] [address], [address] [place] [address] 59          Patient's Medications   Discharge Prescriptions    No medications on file         PDMP Review     None           ED Provider  Attending physically available and evaluated Kwadwo Nugent  I managed the patient along with the ED Attending      Electronically Signed by         Junior Cristian MD  07/18/22 0975

## 2022-07-18 NOTE — ED ATTENDING ATTESTATION
2022  IIno MD, saw and evaluated the patient  I have discussed the patient with the resident/non-physician practitioner and agree with the resident's/non-physician practitioner's findings, Plan of Care, and MDM as documented in the resident's/non-physician practitioner's note, except where noted  All available labs and Radiology studies were reviewed  I was present for key portions of any procedure(s) performed by the resident/non-physician practitioner and I was immediately available to provide assistance  At this point I agree with the current assessment done in the Emergency Department  I have conducted an independent evaluation of this patient a history and physical is as follows:    ED Course  ED Course as of 22 0054   Sun 2022   2239 27F presenting to ED with palpitations x 3 episodes + lightheadedness, intermittent nausea + vomiting, lasting < 5 minutes each, self resolving  , currently 20 weeks, 2 days pregnant  Denies fever, chest pain, shortness of breath, abdominal pain  VS on arrival notable for tachycardia  2242 Pulse(!): 107   2242 Blood Pressure: 113/67   2242 Physical exam grossly unremarkable  Patient is resting comfortably in bed  No respiratory distress  Vital signs within normal limits  Differential diagnosis includes:  Irregular heart rhythm, leg truly imbalance, thyroid problem, DVT/PE, infection, hypotension  Given patient's clinical appearance (well appearing, no respiratory distress, normal vital signs this time, no evidence of DVT on physical exam), doubt DVT/PE  Triage lab work has already been obtained  Grossly unremarkable  Magnesium, phosphorus, TSH were added  Risks versus benefits of obtaining a chest x-ray for further investigation were discussed with the patient  The patient has declined at this time  2250 ECG 12 lead  NSR 97, QRS 82, Qtc 402, no ST elevation or depression, no ectopy, no terminal R   Overall impression:  No STEMI  No abnormal rhythm noted  Mon Jul 18, 2022   0924 Magnesium(!): 1 7  Repleted with oral magnesium per patient request    1076 This time, patient remains without shortness of breath, without chest pain, and with normal vital signs  Patient is well-appearing  Patient has remained hemodynamically and clinically stable in the emergency department for 4 hours and 46 minutes without evidence of impending cardiopulmonary instability  Recommended that patient follow-up with her primary care doctor and/or cardiology for further evaluation and potential Holter monitoring  Patient was agreeable to this  Strict return precautions were given  Upon discharge, patient was fully alert, oriented, GCS 15, ambulatory, without any further complaints           Critical Care Time  Procedures

## 2022-07-18 NOTE — DISCHARGE INSTRUCTIONS
Please make sure to follow-up with your primary care doctor and/or cardiology for further evaluation of her palpitations if you continue to have them  You may require Holter monitoring, which is when you have a monitor placed on you for 24-72 hours  Your physician will then review the results of this heart monitoring to see if there were abnormal heart rhythms at times when you were having palpitations  Otherwise, make sure to continue taking multivitamins including magnesium  Please return to the emergency department if you develop worsening chest pain, shortness of breath, nausea, vomiting, numbness or tingling in the extremities, or any other concerning symptoms

## 2022-08-04 ENCOUNTER — HOSPITAL ENCOUNTER (OUTPATIENT)
Dept: MRI IMAGING | Facility: HOSPITAL | Age: 27
Discharge: HOME | End: 2022-08-04
Payer: COMMERCIAL

## 2022-08-04 DIAGNOSIS — M54.12 CERVICAL RADICULITIS: ICD-10-CM

## 2022-08-04 PROCEDURE — G1004 CDSM NDSC: HCPCS

## 2022-08-04 PROCEDURE — 72141 MRI NECK SPINE W/O DYE: CPT

## 2022-09-17 ENCOUNTER — OFFICE VISIT (OUTPATIENT)
Dept: FAMILY MEDICINE CLINIC | Facility: CLINIC | Age: 27
End: 2022-09-17
Payer: COMMERCIAL

## 2022-09-17 VITALS
WEIGHT: 184 LBS | SYSTOLIC BLOOD PRESSURE: 100 MMHG | RESPIRATION RATE: 16 BRPM | OXYGEN SATURATION: 99 % | BODY MASS INDEX: 33.86 KG/M2 | TEMPERATURE: 98 F | HEIGHT: 62 IN | DIASTOLIC BLOOD PRESSURE: 60 MMHG | HEART RATE: 124 BPM

## 2022-09-17 DIAGNOSIS — Z3A.29 29 WEEKS GESTATION OF PREGNANCY: ICD-10-CM

## 2022-09-17 DIAGNOSIS — U07.1 COVID-19: Primary | ICD-10-CM

## 2022-09-17 LAB
SARS-COV-2 AG UPPER RESP QL IA: POSITIVE
VALID CONTROL: ABNORMAL

## 2022-09-17 PROCEDURE — 99213 OFFICE O/P EST LOW 20 MIN: CPT | Performed by: FAMILY MEDICINE

## 2022-09-17 PROCEDURE — 87636 SARSCOV2 & INF A&B AMP PRB: CPT | Performed by: FAMILY MEDICINE

## 2022-09-17 PROCEDURE — 87811 SARS-COV-2 COVID19 W/OPTIC: CPT | Performed by: FAMILY MEDICINE

## 2022-09-17 NOTE — PROGRESS NOTES
COVID-19 Outpatient Progress Note    Assessment/Plan:    Problem List Items Addressed This Visit        Respiratory    Acute URI - Primary     Faint positive on rapid COVID-19 test in office today  Counseled on rest, hydration, tylenol for fevers/aches, salt water gargles, throat lozenges  Will get PCR for confirmation  I did discuss paxlovid and monoclonal antibody infusion with patient, but for now she will hold off  Other Visit Diagnoses     29 weeks gestation of pregnancy             Disposition:     Rapid antigen testing was performed and the result is POSITIVE for COVID-19  I have spent 15 minutes directly with the patient  Encounter provider: Kaelyn Amaya MD     Provider located at:  [address]  [address] [address]     Recent Visits  No visits were found meeting these conditions  Showing recent visits within past 7 days and meeting all other requirements  Today's Visits  Date Type Provider Dept   09/17/22 Office Visit Kaelyn Amaya MD [facility] today's visits and meeting all other requirements  Future Appointments  No visits were found meeting these conditions  Showing future appointments within next 150 days and meeting all other requirements     Subjective:   Henok Myles is a 28 y o  female who is concerned about COVID-19  Patient's symptoms include fever, chills, fatigue, malaise, nasal congestion, sore throat, vomiting, myalgias and headache  Patient denies rhinorrhea, anosmia, loss of taste, cough, shortness of breath, chest tightness, abdominal pain, nausea and diarrhea       - Date of symptom onset: 9/17/2022      COVID-19 vaccination status: Fully vaccinated (primary series)    Exposure:   Contact with a person who is under investigation (PUI) for or who is positive for COVID-19 within the last 14 days?: No    Hospitalized recently for fever and/or lower respiratory symptoms?: No      Currently a healthcare worker that is involved in direct patient care?: No      Works in a special setting where the risk of COVID-19 transmission may be high? (this may include long-term care, correctional and penitentiary facilities; homeless shelters; assisted-living facilities and group homes ): No      Resident in a special setting where the risk of COVID-19 transmission may be high? (this may include long-term care, correctional and penitentiary facilities; homeless shelters; assisted-living facilities and group homes ): No      Lab Results   Component Value Date    SARSCOV2 NOT DETECTED 07/16/2021    SARSCOVAG Positive (A) 09/17/2022       Review of Systems   Constitutional: Positive for chills, fatigue and fever  HENT: Positive for congestion and sore throat  Negative for rhinorrhea  Eyes: Negative  Respiratory: Negative for cough, chest tightness and shortness of breath  Cardiovascular: Negative  Gastrointestinal: Positive for vomiting  Negative for abdominal pain, diarrhea and nausea  Endocrine: Negative  Genitourinary: Negative  Musculoskeletal: Positive for myalgias  Skin: Negative  Allergic/Immunologic: Negative  Neurological: Positive for headaches  Hematological: Negative  Psychiatric/Behavioral: Negative  Current Outpatient Medications on File Prior to Visit   Medication Sig    Prenatal Vit-Fe Fumarate-FA (PRENATAL VITAMIN PO) Take by mouth in the morning    ESTROGENS CONJUGATED PO Take by mouth (Patient not taking: No sig reported)    progesterone (ENDOMETRIN) 100 MG vaginal insert Insert 100 mg into the vagina daily (Patient not taking: No sig reported)       Objective:    /60   Pulse (!) 124   Temp 98 °F (36 7 °C)   Resp 16   Ht 5' 2" (1 575 m)   Wt 83 5 kg (184 lb) Comment: wheelchair  LMP 12/11/2021 (Exact Date)   SpO2 99%   BMI 33 65 kg/m²      Physical Exam  Constitutional:       General: She is not in acute distress  Appearance: Normal appearance  She is normal weight  She is not ill-appearing, toxic-appearing or diaphoretic  HENT:      Head: Normocephalic and atraumatic  Right Ear: Tympanic membrane, ear canal and external ear normal  There is no impacted cerumen  Left Ear: Tympanic membrane, ear canal and external ear normal  There is no impacted cerumen  Nose: Nose normal       Mouth/Throat:      Mouth: Mucous membranes are moist       Pharynx: Oropharynx is clear  No oropharyngeal exudate or posterior oropharyngeal erythema  Eyes:      General: No scleral icterus  Right eye: No discharge  Left eye: No discharge  Extraocular Movements: Extraocular movements intact  Conjunctiva/sclera: Conjunctivae normal       Pupils: Pupils are equal, round, and reactive to light  Cardiovascular:      Rate and Rhythm: Normal rate and regular rhythm  Pulses: Normal pulses  Heart sounds: Normal heart sounds  No murmur heard  No friction rub  No gallop  Pulmonary:      Effort: Pulmonary effort is normal  No respiratory distress  Breath sounds: Normal breath sounds  No stridor  No wheezing, rhonchi or rales  Chest:      Chest wall: No tenderness  Musculoskeletal:         General: Normal range of motion  Skin:     General: Skin is warm and dry  Neurological:      General: No focal deficit present  Mental Status: She is alert and oriented to person, place, and time         Kenzie Crane MD

## 2022-09-17 NOTE — ASSESSMENT & PLAN NOTE
Faint positive on rapid COVID-19 test in office today  Counseled on rest, hydration, tylenol for fevers/aches, salt water gargles, throat lozenges  Will get PCR for confirmation  I did discuss paxlovid and monoclonal antibody infusion with patient, but for now she will hold off

## 2022-09-17 NOTE — LETTER
September 17, 2022     Patient: Nevin Clinton  YOB: 1995  Date of Visit: 9/17/2022      To Whom it May Concern:    Nevin Clinton is under my professional care  Rand Seals was seen in my office on 9/17/2022  Rand Seals has tested positive for COVID-19  I recommend that she remain out of work until 9/22/22  If you have any questions or concerns, please don't hesitate to call           Sincerely,          Marylee Shuck, MD

## 2022-09-18 LAB
FLUAV RNA RESP QL NAA+PROBE: NEGATIVE
FLUBV RNA RESP QL NAA+PROBE: NEGATIVE
SARS-COV-2 RNA RESP QL NAA+PROBE: POSITIVE

## 2022-09-23 ENCOUNTER — OFFICE VISIT (OUTPATIENT)
Dept: FAMILY MEDICINE CLINIC | Facility: CLINIC | Age: 27
End: 2022-09-23
Payer: COMMERCIAL

## 2022-09-23 VITALS
WEIGHT: 190 LBS | HEART RATE: 95 BPM | OXYGEN SATURATION: 100 % | BODY MASS INDEX: 34.96 KG/M2 | SYSTOLIC BLOOD PRESSURE: 102 MMHG | TEMPERATURE: 97.2 F | DIASTOLIC BLOOD PRESSURE: 60 MMHG | RESPIRATION RATE: 20 BRPM | HEIGHT: 62 IN

## 2022-09-23 DIAGNOSIS — U07.1 COVID-19: Primary | ICD-10-CM

## 2022-09-23 PROCEDURE — 99213 OFFICE O/P EST LOW 20 MIN: CPT | Performed by: FAMILY MEDICINE

## 2022-09-23 NOTE — PROGRESS NOTES
COVID-19 Outpatient Progress Note    Assessment/Plan:    Problem List Items Addressed This Visit    None     Visit Diagnoses     COVID-19    -  Primary         Disposition:     Patient with moderate or severe illness or has a weakened immune system  They should isolate from others through at least day 10  Isolation can be ended if symptoms are improving and they are fever free for the past 24 hours  If they still have fever or other symptoms have not improved, continue to isolate until they improve  Regardless of when you isolation is ended, avoid being around people who are more likely to get very sick from COVID-19 until at least day 11  I have spent 15 minutes directly with the patient  Encounter provider: Randlal Alfaro MD     Provider located at: [place] [address]  [address] [address]     Recent Visits  Date Type Provider Dept   09/17/22 Office Visit MD Florian Mcdonald recent visits within past 7 days and meeting all other requirements  Today's Visits  Date Type Provider Dept   09/23/22 Office Visit MD Florian Mcdonald today's visits and meeting all other requirements  Future Appointments  No visits were found meeting these conditions  Showing future appointments within next 150 days and meeting all other requirements     Subjective:   Arlyn Koyanagi is a 28 y o  female who has been screened for COVID-19  Symptom change since last report: improving  Patient's symptoms include fatigue, nasal congestion, sore throat, cough and vomiting  Patient denies fever, chills, malaise, rhinorrhea, anosmia, loss of taste, shortness of breath, chest tightness, abdominal pain, nausea, diarrhea, myalgias and headaches  - Date of symptom onset: 9/17/2022  - Date of positive COVID-19 test: 9/17/2022  Type of test: PCR       COVID-19 vaccination status: Fully vaccinated (primary series)    Latosha Jimenez has been staying home and has isolated themselves in her home  She is taking care to not share personal items and is cleaning all surfaces that are touched often, like counters, tabletops, and doorknobs using household cleaning sprays or wipes  She is wearing a mask when she leaves her room  Lab Results   Component Value Date    SARSCOV2 Positive (A) 09/17/2022    SARSCOV2 NOT DETECTED 07/16/2021    SARSCOVAG Positive (A) 09/17/2022       Review of Systems   Constitutional: Positive for fatigue  Negative for chills and fever  HENT: Positive for congestion and sore throat  Negative for rhinorrhea  Eyes: Negative  Respiratory: Positive for cough  Negative for chest tightness and shortness of breath  Cardiovascular: Negative  Gastrointestinal: Positive for vomiting  Negative for abdominal pain, diarrhea and nausea  Endocrine: Negative  Genitourinary: Negative  Musculoskeletal: Negative  Negative for myalgias  Skin: Negative  Allergic/Immunologic: Negative  Neurological: Negative  Negative for headaches  Hematological: Negative  Psychiatric/Behavioral: Negative  Current Outpatient Medications on File Prior to Visit   Medication Sig    Prenatal Vit-Fe Fumarate-FA (PRENATAL VITAMIN PO) Take by mouth in the morning    ESTROGENS CONJUGATED PO Take by mouth (Patient not taking: No sig reported)    progesterone (ENDOMETRIN) 100 MG vaginal insert Insert 100 mg into the vagina daily (Patient not taking: No sig reported)       Objective:    /60   Pulse 95   Temp (!) 97 2 °F (36 2 °C)   Resp 20   Ht 5' 2" (1 575 m)   Wt 86 2 kg (190 lb)   LMP 12/11/2021 (Exact Date)   SpO2 100%   BMI 34 75 kg/m²      Physical Exam  Constitutional:       General: She is not in acute distress  Appearance: Normal appearance  She is normal weight  She is not ill-appearing, toxic-appearing or diaphoretic  HENT:      Head: Normocephalic and atraumatic        Right Ear: Tympanic membrane, ear canal and external ear normal  There is no impacted cerumen  Left Ear: Tympanic membrane, ear canal and external ear normal  There is no impacted cerumen  Nose: Nose normal       Mouth/Throat:      Mouth: Mucous membranes are moist       Pharynx: Oropharynx is clear  No oropharyngeal exudate or posterior oropharyngeal erythema  Eyes:      General: No scleral icterus  Right eye: No discharge  Left eye: No discharge  Extraocular Movements: Extraocular movements intact  Conjunctiva/sclera: Conjunctivae normal       Pupils: Pupils are equal, round, and reactive to light  Cardiovascular:      Rate and Rhythm: Normal rate and regular rhythm  Pulses: Normal pulses  Heart sounds: Normal heart sounds  No murmur heard  No friction rub  No gallop  Pulmonary:      Effort: Pulmonary effort is normal  No respiratory distress  Breath sounds: Normal breath sounds  No stridor  No wheezing, rhonchi or rales  Chest:      Chest wall: No tenderness  Abdominal:      Comments: gravid   Musculoskeletal:         General: Normal range of motion  Skin:     General: Skin is warm and dry  Neurological:      General: No focal deficit present  Mental Status: She is alert and oriented to person, place, and time         Randall Alfaro MD

## 2022-10-28 ENCOUNTER — ROUTINE PRENATAL (OUTPATIENT)
Dept: OBGYN CLINIC | Facility: CLINIC | Age: 27
End: 2022-10-28

## 2022-10-28 ENCOUNTER — INITIAL PRENATAL (OUTPATIENT)
Dept: OBGYN CLINIC | Facility: CLINIC | Age: 27
End: 2022-10-28

## 2022-10-28 VITALS
SYSTOLIC BLOOD PRESSURE: 118 MMHG | BODY MASS INDEX: 35.66 KG/M2 | WEIGHT: 193.8 LBS | DIASTOLIC BLOOD PRESSURE: 74 MMHG | HEIGHT: 62 IN

## 2022-10-28 VITALS — BODY MASS INDEX: 35.3 KG/M2 | WEIGHT: 193 LBS | DIASTOLIC BLOOD PRESSURE: 74 MMHG | SYSTOLIC BLOOD PRESSURE: 118 MMHG

## 2022-10-28 DIAGNOSIS — O34.219 PREVIOUS CESAREAN DELIVERY, ANTEPARTUM: ICD-10-CM

## 2022-10-28 DIAGNOSIS — Z3A.35 35 WEEKS GESTATION OF PREGNANCY: ICD-10-CM

## 2022-10-28 DIAGNOSIS — O09.813 PREGNANCY RESULTING FROM ASSISTED REPRODUCTIVE TECHNOLOGY IN THIRD TRIMESTER: Primary | ICD-10-CM

## 2022-10-28 DIAGNOSIS — Z3A.35 35 WEEKS GESTATION OF PREGNANCY: Primary | ICD-10-CM

## 2022-10-28 PROBLEM — O09.819 PREGNANCY RESULTING FROM ASSISTED REPRODUCTIVE TECHNOLOGY: Status: ACTIVE | Noted: 2022-05-31

## 2022-10-28 PROBLEM — O98.513 COVID-19 AFFECTING PREGNANCY IN THIRD TRIMESTER: Status: ACTIVE | Noted: 2022-09-19

## 2022-10-28 PROBLEM — O99.012 ANEMIA DURING PREGNANCY IN SECOND TRIMESTER: Status: ACTIVE | Noted: 2022-07-18

## 2022-10-28 PROBLEM — O09.90 HRP (HIGH RISK PREGNANCY): Status: ACTIVE | Noted: 2022-05-31

## 2022-10-28 PROBLEM — U07.1 COVID-19 AFFECTING PREGNANCY IN THIRD TRIMESTER: Status: ACTIVE | Noted: 2022-09-19

## 2022-10-28 NOTE — PATIENT INSTRUCTIONS
Please refer to Tejinder Bolivar Pregnancy Essentials Guide  [facility] ([facility])  to access our pregnancy essentials reference guide  Here you will find a lot of information for all trimesters of pregnancy as well as postpartum  You will be able to access information about medications that are safe to use during pregnancy, warning signs in third trimester, what to pack for your hospital stay and many other useful guides  There is also information on class available through our [address] and Pregnancy Classes  [facility] ([facility] org)  Please do not hesitate to contact the office through [address] or by calling for [phone] with any questions or concerns  We look forward to seeing you at your next scheduled visit!

## 2022-10-31 ENCOUNTER — TELEPHONE (OUTPATIENT)
Dept: OBGYN CLINIC | Facility: CLINIC | Age: 27
End: 2022-10-31

## 2022-10-31 DIAGNOSIS — R73.09 ELEVATED GLUCOSE: Primary | ICD-10-CM

## 2022-10-31 LAB
ABO GROUP BLD: NORMAL
BASOPHILS # BLD AUTO: 44 CELLS/UL (ref 0–200)
BASOPHILS NFR BLD AUTO: 0.5 %
BLD GP AB SCN SERPL QL: NORMAL
EOSINOPHIL # BLD AUTO: 104 CELLS/UL (ref 15–500)
EOSINOPHIL NFR BLD AUTO: 1.2 %
ERYTHROCYTE [DISTWIDTH] IN BLOOD BY AUTOMATED COUNT: 13.7 % (ref 11–15)
GLUCOSE 1H P 50 G GLC PO SERPL-MCNC: 159 MG/DL
HBV SURFACE AG SERPL QL IA: NORMAL
HCT VFR BLD AUTO: 29.8 % (ref 35–45)
HCV AB S/CO SERPL IA: 0.11
HCV AB SERPL QL IA: NORMAL
HGB BLD-MCNC: 9.7 G/DL (ref 11.7–15.5)
HIV 1+2 AB+HIV1 P24 AG SERPL QL IA: NORMAL
LYMPHOCYTES # BLD AUTO: 1844 CELLS/UL (ref 850–3900)
LYMPHOCYTES NFR BLD AUTO: 21.2 %
MCH RBC QN AUTO: 26.9 PG (ref 27–33)
MCHC RBC AUTO-ENTMCNC: 32.6 G/DL (ref 32–36)
MCV RBC AUTO: 82.8 FL (ref 80–100)
MONOCYTES # BLD AUTO: 513 CELLS/UL (ref 200–950)
MONOCYTES NFR BLD AUTO: 5.9 %
NEUTROPHILS # BLD AUTO: 6194 CELLS/UL (ref 1500–7800)
NEUTROPHILS NFR BLD AUTO: 71.2 %
PLATELET # BLD AUTO: 163 THOUSAND/UL (ref 140–400)
PMV BLD REES-ECKER: 11.1 FL (ref 7.5–12.5)
RBC # BLD AUTO: 3.6 MILLION/UL (ref 3.8–5.1)
RH BLD: NORMAL
RPR SER QL: NORMAL
RUBV IGG SERPL IA-ACNC: 11.9 INDEX
VZV IGG SER IA-ACNC: 309.9 INDEX
WBC # BLD AUTO: 8.7 THOUSAND/UL (ref 3.8–10.8)

## 2022-10-31 NOTE — TELEPHONE ENCOUNTER
Pt  C/o pain since last night  Describes pain as contractions  Pain comes and goes  Endorses FM, declines LOF, and declines vaginal pain  Advised to monitor call with regular ctxns, LOF, vaginal bleeding or decreased FM  Keep scheduled appt   For 11/2

## 2022-11-02 ENCOUNTER — ROUTINE PRENATAL (OUTPATIENT)
Dept: PERINATAL CARE | Facility: OTHER | Age: 27
End: 2022-11-02

## 2022-11-02 VITALS
BODY MASS INDEX: 35.51 KG/M2 | SYSTOLIC BLOOD PRESSURE: 122 MMHG | HEIGHT: 62 IN | HEART RATE: 97 BPM | WEIGHT: 193 LBS | DIASTOLIC BLOOD PRESSURE: 60 MMHG

## 2022-11-02 DIAGNOSIS — O34.219 PREVIOUS CESAREAN DELIVERY, ANTEPARTUM: ICD-10-CM

## 2022-11-02 DIAGNOSIS — Z3A.35 35 WEEKS GESTATION OF PREGNANCY: ICD-10-CM

## 2022-11-02 DIAGNOSIS — O09.813 PREGNANCY RESULTING FROM ASSISTED REPRODUCTIVE TECHNOLOGY IN THIRD TRIMESTER: Primary | ICD-10-CM

## 2022-11-02 DIAGNOSIS — Z3A.36 36 WEEKS GESTATION OF PREGNANCY: ICD-10-CM

## 2022-11-02 NOTE — LETTER
November 4, 2022     MD Maxine K[address]  [facility]    Patient: Kathie Rodriguez   YOB: 1995   Date of Visit: 11/2/2022       Dear Dr Hager Ply: Thank you for referring Kathie Rodriguez to me for evaluation  Below are my notes for this consultation  If you have questions, please do not hesitate to call me  I look forward to following your patient along with you  Sincerely,        Lenny Block MD        CC: No Recipients  Lenny Block MD  11/4/2022  8:57 AM  Sign when Signing Visit  A fetal ultrasound was completed  See Ob procedures in Epic for an interpretation and recommendations  Do not hesitate to contact us in [facility] if you have questions  Von Cloud MD, [address] [facility]  Maternal Fetal Medicine

## 2022-11-03 ENCOUNTER — ROUTINE PRENATAL (OUTPATIENT)
Dept: OBGYN CLINIC | Facility: CLINIC | Age: 27
End: 2022-11-03

## 2022-11-03 VITALS — WEIGHT: 194.2 LBS | DIASTOLIC BLOOD PRESSURE: 72 MMHG | BODY MASS INDEX: 35.52 KG/M2 | SYSTOLIC BLOOD PRESSURE: 110 MMHG

## 2022-11-03 DIAGNOSIS — Z3A.36 36 WEEKS GESTATION OF PREGNANCY: ICD-10-CM

## 2022-11-03 DIAGNOSIS — O09.813 PREGNANCY RESULTING FROM ASSISTED REPRODUCTIVE TECHNOLOGY IN THIRD TRIMESTER: ICD-10-CM

## 2022-11-03 DIAGNOSIS — O34.219 PREVIOUS CESAREAN DELIVERY, ANTEPARTUM: Primary | ICD-10-CM

## 2022-11-03 NOTE — PROGRESS NOTES
Patient states she is feeling well, states she is experiencing some mild contractions       Urine protein trace/ glucose neg

## 2022-11-03 NOTE — PROGRESS NOTES
This is a 28 y o   at 36w1d who presents for return OB visit  No complaints  Irregular contractions  Denies leakage, bleeding  Endorses fetal movement  BP: 110/72 TWlb    GBS bacteruria - treat as GBS positive   GC/CT done: Yes  PCN allergy: No  SVE: poorly tolerated, at least 2cm dilated, posterior  Failed 1 hour, encouraged to do 3 hour GTT in the next week before her next appt  Is considering repeat c/s vs TOLAC  Discussed risks/benefits of both  C/s scheduled for   If she comes in labor may want to [facility], undecided  Labor precautions given  [address] reviewed     F/up 1 wk

## 2022-11-04 LAB
C TRACH DNA SPEC QL NAA+PROBE: NEGATIVE
N GONORRHOEA DNA SPEC QL NAA+PROBE: NEGATIVE

## 2022-11-04 NOTE — PROGRESS NOTES
A fetal ultrasound was completed  See Ob procedures in Epic for an interpretation and recommendations  Do not hesitate to contact us in [facility] if you have questions  Maricruz Tabor MD, [address] [facility]  Maternal Fetal Medicine

## 2022-11-06 LAB
GLUCOSE 1H P CHAL SERPL-MCNC: 159 MG/DL
GLUCOSE 2H P CHAL SERPL-MCNC: 130 MG/DL
GLUCOSE 3H P 100 G GLC PO SERPL-MCNC: 111 MG/DL
GLUCOSE P FAST SERPL-MCNC: 80 MG/DL (ref 65–94)

## 2022-11-08 ENCOUNTER — ROUTINE PRENATAL (OUTPATIENT)
Dept: OBGYN CLINIC | Facility: CLINIC | Age: 27
End: 2022-11-08

## 2022-11-08 VITALS — WEIGHT: 193 LBS | SYSTOLIC BLOOD PRESSURE: 118 MMHG | DIASTOLIC BLOOD PRESSURE: 64 MMHG | BODY MASS INDEX: 35.3 KG/M2

## 2022-11-08 DIAGNOSIS — O34.219 PREVIOUS CESAREAN DELIVERY, ANTEPARTUM: ICD-10-CM

## 2022-11-08 DIAGNOSIS — Z3A.36 36 WEEKS GESTATION OF PREGNANCY: ICD-10-CM

## 2022-11-08 DIAGNOSIS — O09.813 PREGNANCY RESULTING FROM ASSISTED REPRODUCTIVE TECHNOLOGY IN THIRD TRIMESTER: Primary | ICD-10-CM

## 2022-11-16 PROBLEM — J06.9 ACUTE URI: Status: RESOLVED | Noted: 2018-05-23 | Resolved: 2022-11-16

## 2022-11-17 ENCOUNTER — ROUTINE PRENATAL (OUTPATIENT)
Dept: OBGYN CLINIC | Facility: CLINIC | Age: 27
End: 2022-11-17

## 2022-11-17 VITALS — BODY MASS INDEX: 35.67 KG/M2 | SYSTOLIC BLOOD PRESSURE: 120 MMHG | WEIGHT: 195 LBS | DIASTOLIC BLOOD PRESSURE: 70 MMHG

## 2022-11-17 DIAGNOSIS — O34.219 PREVIOUS CESAREAN DELIVERY, ANTEPARTUM: Primary | ICD-10-CM

## 2022-11-17 DIAGNOSIS — Z3A.38 38 WEEKS GESTATION OF PREGNANCY: ICD-10-CM

## 2022-11-17 NOTE — PROGRESS NOTES
28 y o    female at 43 2 wga for PNV  BP : 120/70  TWG: 15  Feeling irregular contractions  Baby is moving well  Prior section is scheduled for repeat section    If she is in labor she would like to have a trial of labor  SVE 3/50/-3, very narrow suprapubic arch  Reactive NST  Reviewed labor precautions and fetal kick counts  Will call with worsening contractions

## 2022-11-20 ENCOUNTER — HOSPITAL ENCOUNTER (OUTPATIENT)
Facility: HOSPITAL | Age: 27
Discharge: HOME | End: 2022-11-21
Attending: OBSTETRICS & GYNECOLOGY | Admitting: OBSTETRICS & GYNECOLOGY

## 2022-11-20 ENCOUNTER — NURSE TRIAGE (OUTPATIENT)
Dept: OTHER | Facility: OTHER | Age: 27
End: 2022-11-20

## 2022-11-20 VITALS
SYSTOLIC BLOOD PRESSURE: 110 MMHG | BODY MASS INDEX: 34.55 KG/M2 | WEIGHT: 195 LBS | DIASTOLIC BLOOD PRESSURE: 66 MMHG | HEART RATE: 86 BPM | HEIGHT: 63 IN

## 2022-11-20 LAB — EXTERNAL GROUP B STREP ANTIGEN: POSITIVE

## 2022-11-21 ENCOUNTER — ROUTINE PRENATAL (OUTPATIENT)
Dept: OBGYN CLINIC | Facility: CLINIC | Age: 27
End: 2022-11-21

## 2022-11-21 VITALS — DIASTOLIC BLOOD PRESSURE: 68 MMHG | SYSTOLIC BLOOD PRESSURE: 118 MMHG

## 2022-11-21 DIAGNOSIS — O34.219 PREVIOUS CESAREAN DELIVERY, ANTEPARTUM: Primary | ICD-10-CM

## 2022-11-21 DIAGNOSIS — Z3A.38 38 WEEKS GESTATION OF PREGNANCY: ICD-10-CM

## 2022-11-21 DIAGNOSIS — O09.813 PREGNANCY RESULTING FROM ASSISTED REPRODUCTIVE TECHNOLOGY IN THIRD TRIMESTER: ICD-10-CM

## 2022-11-21 NOTE — PROGRESS NOTES
L&D Triage Note - OB/GYN  [age]  y o  female MRN: [id number]  Unit/Bed#:  [facility]  Encounter: [id number]      Assessment:  28 y o   at 38w4d who presents with contractions and pink discharge, with cervical exam unchanged from prior at 3/50/-3  Plan:  Stable for discharge home at this time  Return precautions provided - return to hospital for scheduled c/s on  or if she starts experiencing large gush of fluid, significant bleeding, passage of blood clots or tissue, contractions that regularly occur <10 minutes apart, or decreased fetal movement     ______________________________________________________________________      Chief Complaint: Contractions with pink vaginal discharge    Subjective:  28 y o  Ana Parks at 38w4d presenting with scant pink vaginal discharge that started today associated with intermittent and irregular abdominal cramping approximately every 20 minutes  Patient denies any large gush of fluids and reports good fetal movement without any notable change  She denies any large clots or consistent vaginal bleeding, notes blood-tinged mucus only when she wipes  She is a patient of [address]    This pregnancy is complicated by history of  section for arrest of descent in , IVF pregnancy, BMI 34, GBS carrier status  Patient is schedule for repeat c/s on 22 but would prefer to try TOLAC if she goes into labor spontaneously  She has been previously counseled on TOLAC vs RLTCS  Objective:  Vitals:    22 2316   BP: 110/66   Pulse: 86     Physical Exam  Constitutional:       General: She is not in acute distress  Appearance: She is normal weight  She is not ill-appearing or toxic-appearing  Genitourinary:      Vulva normal       Genitourinary Comments: No evidence of bleeding or abnormal discharge on SVE   HENT:      Head: Normocephalic and atraumatic     Eyes:      Conjunctiva/sclera: Conjunctivae normal    Cardiovascular: Pulses: Normal pulses  Pulmonary:      Effort: Pulmonary effort is normal  No respiratory distress  Abdominal:      General: There is no distension  Palpations: Abdomen is soft  Tenderness: There is no abdominal tenderness  There is no guarding or rebound  Musculoskeletal:         General: Normal range of motion  Cervical back: Normal range of motion  Right lower leg: No edema  Left lower leg: No edema  Neurological:      General: No focal deficit present  Mental Status: She is alert  Mental status is at baseline  Skin:     General: Skin is warm and dry  Psychiatric:         Mood and Affect: Mood normal          Behavior: Behavior normal    Exam conducted with a chaperone present             SVE: 3 / 50% / -3  FHT:  120 / Moderate 6 - 25 bpm / Accelerations present, no decelerations, intermittent tracing at times due to excessive fetal movement  [place]: none    TAUS  [facility]     Discussed with Dr Katrin Walker MD  11/20/2022 11:35 PM

## 2022-11-21 NOTE — PROGRESS NOTES
Pt is here for a routine prenatal    Pt is well  Pt has increased urination with pelvic pressure and pain  Pt was bleeding light red     Urine neg dip test trace/neg

## 2022-11-21 NOTE — PROGRESS NOTES
Patient is a 29 YO  female presenting to the office at 38 5 weeks for routine OB care  BP: 118/68  TWG: 15lb  Fetal Movement: yes good movement   Patient was seen on L&D last night for CTX and pink spotting (SVE 3/50/-3)  Patient reports still having inconsistent CTX and some pink tinged mucus, denies heavy bleeding   SVE unchanged  Scheduled for RLTCS on Monday  Considering TOLAC if spontaneous labor, but pt has low threshold for RCS  Reviewed precautions  Call for concerns  Report to L&D 6am on Monday

## 2022-11-21 NOTE — TELEPHONE ENCOUNTER
Reason for Disposition  • Baby moving less today (e g , kick count < 5 in 1 hour or < 10 in 2 hours)    Answer Assessment - Initial Assessment Questions  1  ONSET: "When did the symptoms begin?"       Ctxs and bloody discharge and decreased fetal movement   38 weeks and 4 days     2  CONTRACTIONS: "Describe the contractions that you are having " (e g , duration, frequency, regularity, severity)        Yes    3  KOLTON: "What date are you expecting to deliver?"     11/30 /22    4  PARITY: "Have you had a baby before?" If Yes, ask: "How long did the labor last?"     2nd baby       5  FETAL MOVEMENT: "Has the baby's movement decreased or changed significantly from normal?"       Decreased fetal movement     6   OTHER SYMPTOMS: "Do you have any other symptoms?" (e g , leaking fluid from vagina, vaginal bleeding, fever, hand/facial swelling)     Bleeding    Protocols used: PREGNANCY - LABOR-ADULT-

## 2022-11-21 NOTE — TELEPHONE ENCOUNTER
Regarding: contractions/mucus discharge with blood  ----- Message from Hilario Walton sent at 11/20/2022  9:44 PM EST -----  Pt called " I am having contractions but not sure how far apart they are as I have not kept track  When I did go to the bathroom I had mucus discharge with blood   I also am having slight cramping "

## 2022-11-21 NOTE — TELEPHONE ENCOUNTER
Patient called 38 weeks and 4 days with ctxs, bleeding and decreased fetal movement  Sent to [facility] for evaluation  TT sent to on call  And charge RN

## 2022-11-24 ENCOUNTER — HOSPITAL ENCOUNTER (INPATIENT)
Facility: HOSPITAL | Age: 27
LOS: 3 days | Discharge: HOME | End: 2022-11-27
Attending: OBSTETRICS & GYNECOLOGY | Admitting: OBSTETRICS & GYNECOLOGY

## 2022-11-24 ENCOUNTER — NURSE TRIAGE (OUTPATIENT)
Dept: OTHER | Facility: OTHER | Age: 27
End: 2022-11-24

## 2022-11-24 ENCOUNTER — ANESTHESIA (INPATIENT)
Dept: LABOR AND DELIVERY | Facility: HOSPITAL | Age: 27
End: 2022-11-24

## 2022-11-24 ENCOUNTER — ANESTHESIA EVENT (INPATIENT)
Dept: LABOR AND DELIVERY | Facility: HOSPITAL | Age: 27
End: 2022-11-24

## 2022-11-24 DIAGNOSIS — O47.03 PREMATURE UTERINE CONTRACTIONS CAUSING THREATENED PREMATURE LABOR, ANTEPARTUM, THIRD TRIMESTER: ICD-10-CM

## 2022-11-24 DIAGNOSIS — Z98.891 S/P CESAREAN SECTION: Primary | ICD-10-CM

## 2022-11-24 DIAGNOSIS — O34.219 PREVIOUS CESAREAN DELIVERY, ANTEPARTUM: ICD-10-CM

## 2022-11-24 DIAGNOSIS — O34.219 PREVIOUS CESAREAN SECTION COMPLICATING PREGNANCY: ICD-10-CM

## 2022-11-24 PROBLEM — Z3A.39 39 WEEKS GESTATION OF PREGNANCY: Status: ACTIVE | Noted: 2022-10-28

## 2022-11-24 LAB
ABO GROUP BLD: NORMAL
BASE EXCESS BLDCOA CALC-SCNC: -6.7 MMOL/L (ref 3–11)
BASE EXCESS BLDCOV CALC-SCNC: -7.1 MMOL/L (ref 1–9)
BASOPHILS # BLD AUTO: 0.04 THOUSANDS/ÂΜL (ref 0–0.1)
BASOPHILS NFR BLD AUTO: 0 % (ref 0–1)
BLD GP AB SCN SERPL QL: NEGATIVE
EOSINOPHIL # BLD AUTO: 0.05 THOUSAND/ÂΜL (ref 0–0.61)
EOSINOPHIL NFR BLD AUTO: 0 % (ref 0–6)
ERYTHROCYTE [DISTWIDTH] IN BLOOD BY AUTOMATED COUNT: 15 % (ref 11.6–15.1)
HCO3 BLDCOA-SCNC: 23.6 MMOL/L (ref 17.3–27.3)
HCO3 BLDCOV-SCNC: 22.2 MMOL/L (ref 12.2–28.6)
HCT VFR BLD AUTO: 32 % (ref 34.8–46.1)
HGB BLD-MCNC: 10.3 G/DL (ref 11.5–15.4)
HOLD SPECIMEN: NORMAL
IMM GRANULOCYTES # BLD AUTO: 0.07 THOUSAND/UL (ref 0–0.2)
IMM GRANULOCYTES NFR BLD AUTO: 1 % (ref 0–2)
LYMPHOCYTES # BLD AUTO: 1.99 THOUSANDS/ÂΜL (ref 0.6–4.47)
LYMPHOCYTES NFR BLD AUTO: 18 % (ref 14–44)
MCH RBC QN AUTO: 26.4 PG (ref 26.8–34.3)
MCHC RBC AUTO-ENTMCNC: 32.2 G/DL (ref 31.4–37.4)
MCV RBC AUTO: 82 FL (ref 82–98)
MONOCYTES # BLD AUTO: 0.6 THOUSAND/ÂΜL (ref 0.17–1.22)
MONOCYTES NFR BLD AUTO: 5 % (ref 4–12)
NEUTROPHILS # BLD AUTO: 8.41 THOUSANDS/ÂΜL (ref 1.85–7.62)
NEUTS SEG NFR BLD AUTO: 76 % (ref 43–75)
NRBC BLD AUTO-RTO: 0 /100 WBCS
O2 CT VFR BLDCOA CALC: 5.4 ML/DL
OXYHGB MFR BLDCOA: 25.2 %
OXYHGB MFR BLDCOV: 42.6 %
PCO2 BLDCOA: 69.6 MM[HG] (ref 30–60)
PCO2 BLDCOV: 60.2 MM HG (ref 27–43)
PH BLDCOA: 7.15 [PH] (ref 7.23–7.43)
PH BLDCOV: 7.18 [PH] (ref 7.19–7.49)
PLATELET # BLD AUTO: 183 THOUSANDS/UL (ref 149–390)
PMV BLD AUTO: 12.2 FL (ref 8.9–12.7)
PO2 BLDCOA: 16.1 MM HG (ref 5–25)
PO2 BLDCOV: 22 MM HG (ref 15–45)
RBC # BLD AUTO: 3.9 MILLION/UL (ref 3.81–5.12)
RH BLD: POSITIVE
RPR SER QL: NORMAL
SAO2 % BLDCOV: 9.1 ML/DL
SPECIMEN EXPIRATION DATE: NORMAL
WBC # BLD AUTO: 11.16 THOUSAND/UL (ref 4.31–10.16)

## 2022-11-24 PROCEDURE — 4A1HXCZ MONITORING OF PRODUCTS OF CONCEPTION, CARDIAC RATE, EXTERNAL APPROACH: ICD-10-PCS | Performed by: OBSTETRICS & GYNECOLOGY

## 2022-11-24 RX ORDER — DEXAMETHASONE SODIUM PHOSPHATE 10 MG/ML
INJECTION, SOLUTION INTRAMUSCULAR; INTRAVENOUS AS NEEDED
Status: DISCONTINUED | OUTPATIENT
Start: 2022-11-24 | End: 2022-11-24

## 2022-11-24 RX ORDER — DIPHENHYDRAMINE HYDROCHLORIDE 50 MG/ML
25 INJECTION INTRAMUSCULAR; INTRAVENOUS EVERY 6 HOURS PRN
Status: ACTIVE | OUTPATIENT
Start: 2022-11-24 | End: 2022-11-25

## 2022-11-24 RX ORDER — ONDANSETRON 2 MG/ML
4 INJECTION INTRAMUSCULAR; INTRAVENOUS EVERY 6 HOURS PRN
Status: ACTIVE | OUTPATIENT
Start: 2022-11-24 | End: 2022-11-25

## 2022-11-24 RX ORDER — ONDANSETRON 2 MG/ML
4 INJECTION INTRAMUSCULAR; INTRAVENOUS EVERY 8 HOURS PRN
Status: DISCONTINUED | OUTPATIENT
Start: 2022-11-25 | End: 2022-11-27 | Stop reason: HOSPADM

## 2022-11-24 RX ORDER — ACETAMINOPHEN 325 MG/1
650 TABLET ORAL EVERY 6 HOURS SCHEDULED
Status: DISCONTINUED | OUTPATIENT
Start: 2022-11-24 | End: 2022-11-24

## 2022-11-24 RX ORDER — OXYTOCIN/RINGER'S LACTATE 30/500 ML
PLASTIC BAG, INJECTION (ML) INTRAVENOUS CONTINUOUS PRN
Status: DISCONTINUED | OUTPATIENT
Start: 2022-11-24 | End: 2022-11-24

## 2022-11-24 RX ORDER — ONDANSETRON 2 MG/ML
INJECTION INTRAMUSCULAR; INTRAVENOUS AS NEEDED
Status: DISCONTINUED | OUTPATIENT
Start: 2022-11-24 | End: 2022-11-24

## 2022-11-24 RX ORDER — CALCIUM CARBONATE 200(500)MG
1000 TABLET,CHEWABLE ORAL DAILY PRN
Status: DISCONTINUED | OUTPATIENT
Start: 2022-11-24 | End: 2022-11-27 | Stop reason: HOSPADM

## 2022-11-24 RX ORDER — BUPIVACAINE HYDROCHLORIDE 7.5 MG/ML
INJECTION, SOLUTION EPIDURAL; RETROBULBAR AS NEEDED
Status: DISCONTINUED | OUTPATIENT
Start: 2022-11-24 | End: 2022-11-24

## 2022-11-24 RX ORDER — KETOROLAC TROMETHAMINE 30 MG/ML
30 INJECTION, SOLUTION INTRAMUSCULAR; INTRAVENOUS EVERY 6 HOURS
Status: COMPLETED | OUTPATIENT
Start: 2022-11-24 | End: 2022-11-25

## 2022-11-24 RX ORDER — OXYCODONE HYDROCHLORIDE 5 MG/1
5 TABLET ORAL EVERY 4 HOURS PRN
Status: CANCELLED | OUTPATIENT
Start: 2022-11-24

## 2022-11-24 RX ORDER — MORPHINE SULFATE 1 MG/ML
INJECTION, SOLUTION EPIDURAL; INTRATHECAL; INTRAVENOUS AS NEEDED
Status: DISCONTINUED | OUTPATIENT
Start: 2022-11-24 | End: 2022-11-24

## 2022-11-24 RX ORDER — OXYTOCIN/RINGER'S LACTATE 30/500 ML
62.5 PLASTIC BAG, INJECTION (ML) INTRAVENOUS ONCE
Status: COMPLETED | OUTPATIENT
Start: 2022-11-24 | End: 2022-11-24

## 2022-11-24 RX ORDER — FENTANYL CITRATE/PF 50 MCG/ML
50 SYRINGE (ML) INJECTION
Status: DISCONTINUED | OUTPATIENT
Start: 2022-11-24 | End: 2022-11-27 | Stop reason: HOSPADM

## 2022-11-24 RX ORDER — SODIUM CHLORIDE, SODIUM LACTATE, POTASSIUM CHLORIDE, CALCIUM CHLORIDE 600; 310; 30; 20 MG/100ML; MG/100ML; MG/100ML; MG/100ML
125 INJECTION, SOLUTION INTRAVENOUS CONTINUOUS
Status: DISCONTINUED | OUTPATIENT
Start: 2022-11-24 | End: 2022-11-27 | Stop reason: HOSPADM

## 2022-11-24 RX ORDER — ONDANSETRON 2 MG/ML
4 INJECTION INTRAMUSCULAR; INTRAVENOUS EVERY 8 HOURS PRN
Status: DISCONTINUED | OUTPATIENT
Start: 2022-11-24 | End: 2022-11-24

## 2022-11-24 RX ORDER — DIAPER,BRIEF,INFANT-TODD,DISP
1 EACH MISCELLANEOUS DAILY PRN
Status: DISCONTINUED | OUTPATIENT
Start: 2022-11-24 | End: 2022-11-27 | Stop reason: HOSPADM

## 2022-11-24 RX ORDER — DIPHENHYDRAMINE HCL 25 MG
25 TABLET ORAL EVERY 6 HOURS PRN
Status: DISCONTINUED | OUTPATIENT
Start: 2022-11-24 | End: 2022-11-27 | Stop reason: HOSPADM

## 2022-11-24 RX ORDER — CEFAZOLIN SODIUM 2 G/50ML
2000 SOLUTION INTRAVENOUS ONCE
Status: DISCONTINUED | OUTPATIENT
Start: 2022-11-24 | End: 2022-11-24

## 2022-11-24 RX ORDER — SODIUM CHLORIDE, SODIUM LACTATE, POTASSIUM CHLORIDE, CALCIUM CHLORIDE 600; 310; 30; 20 MG/100ML; MG/100ML; MG/100ML; MG/100ML
INJECTION, SOLUTION INTRAVENOUS CONTINUOUS PRN
Status: DISCONTINUED | OUTPATIENT
Start: 2022-11-24 | End: 2022-11-24

## 2022-11-24 RX ORDER — CEFAZOLIN SODIUM 1 G/3ML
INJECTION, POWDER, FOR SOLUTION INTRAMUSCULAR; INTRAVENOUS AS NEEDED
Status: DISCONTINUED | OUTPATIENT
Start: 2022-11-24 | End: 2022-11-24

## 2022-11-24 RX ORDER — SODIUM CHLORIDE, SODIUM LACTATE, POTASSIUM CHLORIDE, CALCIUM CHLORIDE 600; 310; 30; 20 MG/100ML; MG/100ML; MG/100ML; MG/100ML
125 INJECTION, SOLUTION INTRAVENOUS CONTINUOUS
Status: DISCONTINUED | OUTPATIENT
Start: 2022-11-24 | End: 2022-11-24

## 2022-11-24 RX ORDER — KETOROLAC TROMETHAMINE 30 MG/ML
30 INJECTION, SOLUTION INTRAMUSCULAR; INTRAVENOUS EVERY 6 HOURS
Status: DISCONTINUED | OUTPATIENT
Start: 2022-11-25 | End: 2022-11-25

## 2022-11-24 RX ORDER — METOCLOPRAMIDE HYDROCHLORIDE 5 MG/ML
10 INJECTION INTRAMUSCULAR; INTRAVENOUS ONCE AS NEEDED
Status: DISCONTINUED | OUTPATIENT
Start: 2022-11-24 | End: 2022-11-27 | Stop reason: HOSPADM

## 2022-11-24 RX ORDER — ACETAMINOPHEN 325 MG/1
650 TABLET ORAL EVERY 6 HOURS SCHEDULED
Status: DISCONTINUED | OUTPATIENT
Start: 2022-11-24 | End: 2022-11-27 | Stop reason: HOSPADM

## 2022-11-24 RX ORDER — HYDROMORPHONE HCL/PF 1 MG/ML
1 SYRINGE (ML) INJECTION EVERY 2 HOUR PRN
Status: DISCONTINUED | OUTPATIENT
Start: 2022-11-25 | End: 2022-11-27 | Stop reason: HOSPADM

## 2022-11-24 RX ORDER — NALBUPHINE HCL 10 MG/ML
10 AMPUL (ML) INJECTION
Status: COMPLETED | OUTPATIENT
Start: 2022-11-24 | End: 2022-11-25

## 2022-11-24 RX ORDER — SIMETHICONE 80 MG
80 TABLET,CHEWABLE ORAL 4 TIMES DAILY PRN
Status: DISCONTINUED | OUTPATIENT
Start: 2022-11-24 | End: 2022-11-27 | Stop reason: HOSPADM

## 2022-11-24 RX ORDER — NALOXONE HYDROCHLORIDE 0.4 MG/ML
0.1 INJECTION, SOLUTION INTRAMUSCULAR; INTRAVENOUS; SUBCUTANEOUS
Status: ACTIVE | OUTPATIENT
Start: 2022-11-24 | End: 2022-11-25

## 2022-11-24 RX ORDER — HYDROMORPHONE HCL/PF 1 MG/ML
0.5 SYRINGE (ML) INJECTION
Status: DISCONTINUED | OUTPATIENT
Start: 2022-11-24 | End: 2022-11-27 | Stop reason: HOSPADM

## 2022-11-24 RX ORDER — IBUPROFEN 600 MG/1
600 TABLET ORAL EVERY 6 HOURS
Status: DISCONTINUED | OUTPATIENT
Start: 2022-11-26 | End: 2022-11-25

## 2022-11-24 RX ORDER — DOCUSATE SODIUM 100 MG/1
100 CAPSULE, LIQUID FILLED ORAL 2 TIMES DAILY
Status: DISCONTINUED | OUTPATIENT
Start: 2022-11-24 | End: 2022-11-27 | Stop reason: HOSPADM

## 2022-11-24 RX ADMIN — DOCUSATE SODIUM 100 MG: 100 CAPSULE, LIQUID FILLED ORAL at 17:46

## 2022-11-24 RX ADMIN — SODIUM CHLORIDE, SODIUM LACTATE, POTASSIUM CHLORIDE, AND CALCIUM CHLORIDE: .6; .31; .03; .02 INJECTION, SOLUTION INTRAVENOUS at 11:12

## 2022-11-24 RX ADMIN — CEFAZOLIN 2000 MG: 1 INJECTION, POWDER, FOR SOLUTION INTRAMUSCULAR; INTRAVENOUS at 11:28

## 2022-11-24 RX ADMIN — ONDANSETRON 4 MG: 2 INJECTION INTRAMUSCULAR; INTRAVENOUS at 11:50

## 2022-11-24 RX ADMIN — SODIUM CHLORIDE, POTASSIUM CHLORIDE, SODIUM LACTATE AND CALCIUM CHLORIDE 125 ML/HR: 600; 310; 30; 20 INJECTION, SOLUTION INTRAVENOUS at 10:50

## 2022-11-24 RX ADMIN — KETOROLAC TROMETHAMINE 30 MG: 30 INJECTION, SOLUTION INTRAMUSCULAR at 21:20

## 2022-11-24 RX ADMIN — AZITHROMYCIN 500 MG: 500 INJECTION, POWDER, LYOPHILIZED, FOR SOLUTION INTRAVENOUS at 11:29

## 2022-11-24 RX ADMIN — Medication 62.5 MILLI-UNITS/MIN: at 12:59

## 2022-11-24 RX ADMIN — SODIUM CHLORIDE, SODIUM LACTATE, POTASSIUM CHLORIDE, AND CALCIUM CHLORIDE 125 ML/HR: .6; .31; .03; .02 INJECTION, SOLUTION INTRAVENOUS at 13:56

## 2022-11-24 RX ADMIN — ONDANSETRON 4 MG: 2 INJECTION INTRAMUSCULAR; INTRAVENOUS at 11:28

## 2022-11-24 RX ADMIN — SODIUM CHLORIDE 5 MILLION UNITS: 0.9 INJECTION, SOLUTION INTRAVENOUS at 09:33

## 2022-11-24 RX ADMIN — MORPHINE SULFATE 0.2 MG: 1 INJECTION, SOLUTION EPIDURAL; INTRATHECAL; INTRAVENOUS at 11:20

## 2022-11-24 RX ADMIN — PHENYLEPHRINE HYDROCHLORIDE 40 MCG/MIN: 50 INJECTION INTRAVENOUS at 11:21

## 2022-11-24 RX ADMIN — SODIUM CHLORIDE, POTASSIUM CHLORIDE, SODIUM LACTATE AND CALCIUM CHLORIDE 125 ML/HR: 600; 310; 30; 20 INJECTION, SOLUTION INTRAVENOUS at 09:31

## 2022-11-24 RX ADMIN — NALBUPHINE HYDROCHLORIDE 10 MG: 10 INJECTION, SOLUTION INTRAMUSCULAR; INTRAVENOUS; SUBCUTANEOUS at 13:45

## 2022-11-24 RX ADMIN — DEXAMETHASONE SODIUM PHOSPHATE 10 MG: 10 INJECTION, SOLUTION INTRAMUSCULAR; INTRAVENOUS at 11:28

## 2022-11-24 RX ADMIN — NALBUPHINE HYDROCHLORIDE 10 MG: 10 INJECTION, SOLUTION INTRAMUSCULAR; INTRAVENOUS; SUBCUTANEOUS at 21:20

## 2022-11-24 RX ADMIN — ACETAMINOPHEN 650 MG: 325 TABLET, FILM COATED ORAL at 23:39

## 2022-11-24 RX ADMIN — BUPIVACAINE HYDROCHLORIDE 1.6 ML: 7.5 INJECTION, SOLUTION EPIDURAL; RETROBULBAR at 11:20

## 2022-11-24 RX ADMIN — KETOROLAC TROMETHAMINE 30 MG: 30 INJECTION, SOLUTION INTRAMUSCULAR at 14:45

## 2022-11-24 RX ADMIN — Medication 250 MILLI-UNITS/MIN: at 11:40

## 2022-11-24 RX ADMIN — ACETAMINOPHEN 650 MG: 325 TABLET, FILM COATED ORAL at 17:46

## 2022-11-24 NOTE — ANESTHESIA POSTPROCEDURE EVALUATION
Post-Op Assessment Note    CV Status:  Stable  Pain Score: 0    Pain management: adequate  Multimodal analgesia used between 6 hours prior to anesthesia start to PACU discharge    Mental Status:  Alert   Hydration Status:  Euvolemic   PONV Controlled:  None   Airway Patency:  Patent      Post Op Vitals Reviewed: Yes      Staff: CRNA         No notable events documented      BP   103/66   Temp   97 5F   Pulse  86   Resp  18   SpO2   97%

## 2022-11-24 NOTE — DISCHARGE INSTRUCTIONS
WHAT YOU NEED TO KNOW:   A , or  section, is abdominal surgery to deliver your baby  DISCHARGE INSTRUCTIONS:   Call 911 for any of the following: You feel lightheaded, short of breath, and have chest pain  You cough up blood  Seek care immediately if:   Blood soaks through your bandage  Your stitches come apart  Your arm or leg feels warm, tender, and painful  It may look swollen and red  Contact your OB if:   You have heavy vaginal bleeding that fills 1 or more sanitary pads in 1 hour  You have a fever  Your incision is swollen, red, or draining pus  You have questions or concerns about yourself or your baby  Medicines: You may  need any of the following:  Prescription pain medicine  may be given  Ask how to take this medicine safely  Acetaminophen  decreases pain and fever  It is available without a doctor's order  Ask how much to take and how often to take it  Follow directions  Acetaminophen can cause liver damage if not taken correctly  NSAIDs , such as ibuprofen, help decrease swelling, pain, and fever  NSAIDs can cause stomach bleeding or kidney problems in certain people  If you take blood thinner medicine, always ask your healthcare provider if NSAIDs are safe for you  Always read the medicine label and follow directions  Take your medicine as directed  Contact your healthcare provider if you think your medicine is not helping or if you have side effects  Tell him or her if you are allergic to any medicine  Keep a list of the medicines, vitamins, and herbs you take  Include the amounts, and when and why you take them  Bring the list or the pill bottles to follow-up visits  Carry your medicine list with you in case of an emergency  Wound care:  Carefully wash your wound with soap and water every day  Keep your wound clean and dry  Wear loose, comfortable clothes that do not rub against your wound   Ask your OB about bathing and showering  Limit activity as directed: Ask when it is safe for you to drive, walk up stairs, lift heavy objects, and have sex  Ask when it is okay to exercise, and what types of exercise to do  Start slowly and do more as you get stronger  Drink liquids as directed:  Liquids help keep you hydrated after your procedure and decrease your risk for a blood clot  Ask how much liquid to drink each day and which liquids are best for you  Follow up with your OB as directed: You may need to return to have your stitches or staples removed  Write down your questions so you remember to ask them during your visits  © 2017 [employer]  Information is for End User's use only and may not be sold, redistributed or otherwise used for commercial purposes  All illustrations and images included in CareNotes® are the copyrighted property of [employer]  or [employer]one  The above information is an  only  It is not intended as medical advice for individual conditions or treatments  Talk to your doctor, nurse or pharmacist before following any medical regimen to see if it is safe and effective for you  Postpartum Perineal Care   WHAT YOU NEED TO KNOW:   Postpartum perineal care is care for your perineum after you have a baby  The perineum is your vagina and anus  DISCHARGE INSTRUCTIONS:   Care for your perineum:  Healthcare providers will give you a small squirt bottle and show you how to use it  Do the following after you use the toilet and before you put on a new pad:  Remove the soiled pad    Use the squirt bottle to rinse your perineum from front to back while you sit on the toilet     Pat the area dry from front to back with toilet paper or a cotton cloth     Put on a fresh pad    Wash your hands  Decrease pain:  Ask your healthcare provider about these and other ways to decrease perineal pain:  Sitz baths:  Healthcare providers may give you a portable sitz bath   This is a small tub that fits in the toilet  Fill the sitz bath or bathtub with 4 to 6 inches of warm water  Sit in the warm water for 20 minutes 2 to 3 times a day  Ice:  Ice helps decrease swelling and pain  Ice may also help prevent tissue damage  Use an ice pack, or put crushed ice in a plastic bag  Cover it with a towel and place it on your perineum for 15 to 20 minutes every hour, or as directed  Medicine spray, wipes, or pads:  Healthcare providers may give you a medicine spray or wipes soaked with numbing medicine to decrease the pain  Pads that contain an herb called witch hazel may also help reduce pain  Use these after perineal care or a sitz bath  Follow up with your healthcare provider as directed:  Write down your questions so you remember to ask them during your visits  Contact your healthcare provider if:   You have heavy vaginal bleeding that fills 1 or more sanitary pads in 1 hour  You have foul-smelling vaginal discharge  You feel weak or lightheaded  You have questions or concerns about your condition or care  Seek care immediately or call 911 if:   You have large blood clots or bright red blood coming from your vagina  You have abdominal pain, vomiting, and a fever  © 2017 [employer]  Information is for End User's use only and may not be sold, redistributed or otherwise used for commercial purposes  All illustrations and images included in CareNotes® are the copyrighted property of [employer]  or [employer]inaldo Boone  The above information is an  only  It is not intended as medical advice for individual conditions or treatments  Talk to your doctor, nurse or pharmacist before following any medical regimen to see if it is safe and effective for you  Postpartum Depression   WHAT YOU NEED TO KNOW:   What is postpartum depression? Postpartum depression is a mood disorder that occurs after giving birth  A mood is an emotion or a feeling   Moods affect your behavior and how you feel about yourself and life in general  Depression is a sad mood that you cannot control  Women often feel sad, afraid, or nervous after their baby is born  These feelings are called postpartum blues or baby blues, and they usually go away in 1 to 2 weeks  With postpartum depression, these symptoms get worse and continue for more than 2 weeks  Postpartum depression is a serious condition that affects your daily activities and relationships  What causes postpartum depression? Healthcare providers do not know exactly what causes postpartum depression  It may be caused by a sudden drop in hormone levels after childbirth  A previous episode of postpartum depression or a family history of depression may increase your risk  Several things may trigger postpartum depression:  Lack of support from the baby's father or other family members    Feeling more tired than usual    Stress, a poor diet, or lack of sleep    Pain after childbirth or pain during breastfeeding    Sudden change in lifestyle  How is postpartum depression diagnosed? Postpartum depression affects your daily activities and your relationships with other people  Healthcare providers will ask you questions about your signs and symptoms and how they are affecting your life  The symptoms of postpartum depression usually begin within 1 month after childbirth  You feel depressed or lose interest in activities you enjoy nearly every day for at least 2 weeks  You also have 4 or more of the following symptoms: You feel tired or have less energy than usual      You feel unimportant or guilty most of the time  You think about hurting or killing yourself  Your appetite changes  You may lose your appetite and lose weight without trying  Your appetite may also increase and you may gain weight  You are restless, irritable, or withdrawn  You have trouble concentrating and remembering things   You have trouble doing daily tasks or making decisions  You have trouble sleeping, even after the baby is asleep  How is postpartum depression treated? Psychotherapy:  During therapy, you will talk with healthcare providers about how to cope with your feelings and moods  This can be done alone or in a group  It may also be done with family members or your partner  Antidepressants: This medicine is given to decrease or stop the symptoms of depression  You usually need to take antidepressants for several weeks before you begin to feel better  Do not stop taking antidepressants unless your healthcare provider tells you to  Healthcare providers may try a different antidepressant if one type does not work  What can I do to feel better? Rest:  Do not try to do everything all at the same time  Do only what is needed and let other things wait until later  Ask your family or friends for help, especially if you have other children  Ask your partner to help with night feedings or other baby care  Try to sleep when the baby naps  Get emotional support:  Share your feelings with your partner, a friend, or another mother  Take care of yourself:  Shower and dress each day  Do not skip meals  Try to get out of the house a little each day  Get regular exercise  Eat a healthy diet  Avoid alcohol because it can make your depression worse  Do not isolate yourself  Go for a walk or meet with a friend  It is also important that you have some time by yourself each day  How do I find support and more information? [address] [place] [address], Public Information & Communication Branch  [address], [address], [address]  [place] [address]   Phone: [phone]  Phone: [phone]  Web Address: [facility]  When should I contact my healthcare provider? You cannot make it to your next visit  Your depression does not get better with treatment or it gets worse       You have questions or concerns about your condition or care  When should I seek immediate care or call 911? You think about hurting or killing yourself, your baby, or someone else  You feel like other people want to hurt you  You hear voices telling you to hurt yourself or your baby  CARE AGREEMENT:   You have the right to help plan your care  Learn about your health condition and how it may be treated  Discuss treatment options with your caregivers to decide what care you want to receive  You always have the right to refuse treatment  The above information is an  only  It is not intended as medical advice for individual conditions or treatments  Talk to your doctor, nurse or pharmacist before following any medical regimen to see if it is safe and effective for you  ©  2600 [facility] Information is for End User's use only and may not be sold, redistributed or otherwise used for commercial purposes  All illustrations and images included in CareNotes® are the copyrighted property of [employer]  or [employer]one  Postpartum Bleeding   WHAT YOU NEED TO KNOW:   Postpartum bleeding is vaginal bleeding after childbirth  This bleeding is normal, whether your baby was born vaginally or by   It contains blood and the tissue that lined the inside of your uterus when you were pregnant  DISCHARGE INSTRUCTIONS:   What to expect with postpartum bleeding:  Postpartum bleeding usually lasts at least 10 days, and may last longer than 6 weeks  Your bleeding may range from light (barely staining a pad) to heavy (soaking a pad in 1 hour)  Usually, you have heavier bleeding right after childbirth, which slows over the next few weeks until it stops  The bleeding is red or dark brown with clots for the first 1 to 3 days  It then turns pink for several days, and then becomes a white or yellow discharge until it ends    Follow up with your obstetrician as directed:  Do not have sex until your obstetrician says it is okay  Write down your questions so you remember to ask them during your visits  Contact your healthcare provider or obstetrician if:   Your bleeding increases, or you have heavy bleeding that soaks a pad in 1 hour for 2 hours in a row  You pass large blood clots  You are breathing faster than normal, or your heart is beating faster than normal     You are urinating less than usual, or not at all  You feel dizzy  You have questions or concerns about your condition or care  Seek immediate care or call 911 if:   You are suddenly short of breath and feel lightheaded  You have sudden chest pain  © 2017 [employer]  Information is for End User's use only and may not be sold, redistributed or otherwise used for commercial purposes  All illustrations and images included in CareNotes® are the copyrighted property of [employer]  or [employer]one  The above information is an  only  It is not intended as medical advice for individual conditions or treatments  Talk to your doctor, nurse or pharmacist before following any medical regimen to see if it is safe and effective for you  Breast Care for the Breast Feeding Mother   WHAT YOU SHOULD KNOW:   Your breasts will go through normal changes while you are breastfeeding  Sometimes breast and nipple problems can develop while you are breastfeeding  Learn about changes that are normal and those that may be a problem  Breast care can help you prevent and manage problems so you and your baby can enjoy the benefits of breastfeeding  AFTER YOU LEAVE:   Breast changes while you are breastfeeding: For the first few days after your baby is born, your body makes a small amount of breast milk (colostrum)  Within about 2 to 5 days, your body will begin making mature milk  It may take up to 10 days or longer for mature milk to come in   When your mature milk comes in, your breasts will become full and firm  They may feel tender  Breastfeeding your baby will decrease the full feeling in your breasts  You may feel a tingly sensation during feedings as milk is released from your breasts  This is called the milk let-down reflex  After 7 or more days, the fullness may feel like it has decreased  Your nipples should look the same as they did before you started breastfeeding  Breasts that feel full before and empty after breastfeeding are signs that breastfeeding is going well  Breast problems that can occur while you are breastfeeding:   Nipple soreness  may occur when you begin to breastfeed your baby  You may also have nipple soreness if your baby is not latched on to your breast correctly  Correct positioning and latch-on may decrease or stop the pain in your nipples  Work with your caregivers to help your baby latch on correctly  It may also be helpful to place warm, wet compresses on your nipples to help decrease pain  Plugged milk ducts  may cause painful breast lumps  Plugged ducts may be caused by not emptying your breasts completely during feedings  When your baby pauses during breastfeeding, massage and gently squeeze your breast  Gentle massage may unplug a blocked milk duct  Pump out any milk left in your breasts after your baby is done breastfeeding  Avoid wearing tight tops, tight bras, or under-wire bras, because they may put pressure on your breasts  Engorgement  may occur as your milk comes in soon after you begin breastfeeding  Engorgement may cause your breasts to become swollen and painful  Your breasts may also become engorged if you miss a feeding or you do not breastfeed on demand  The best way to decrease engorgement symptoms is to empty your breasts by feeding your baby often  Engorgement can make it hard for your baby to latch on to your breast  If this happens, express a small amount of milk and then have your baby latch on   Cold compresses, gel packs, or ice packs on your breasts can help decrease pain and swelling  Ask your caregiver how often and how long you should use cold, or ice packs  A breast infection called mastitis  can develop if you have plugged milk ducts or engorgement  Mastitis causes your breasts to become red, swollen, and painful  You may also have flu-like symptoms, such as chills and a fever  Place heat on your breasts to help decrease the pain  You may want to place a moist, warm cloth on the painful breast or both of your breasts  Ask how often to do this  Your primary healthcare provider [facility]) may suggest that you take an NSAID, such as ibuprofen, to decrease pain and swelling  He may also order antibiotics to treat mastitis  Ask about feeding your baby when you have a breast infection  How to help prevent or manage breast problems while you are breastfeeding:   Learn how to position your baby and latch him on correctly  To latch your baby correctly to your breast, make sure that his mouth covers most of your areola (dark area around your nipple)  He should not be attached only to the nipple  Your baby is latched on well if you feel comfortable and do not feel pain  A correct latch helps him get enough milk and can help to prevent sore nipples and other breast problems  There are several breastfeeding positions that you can try  Find the position that works best for you and your baby  Ask your caregiver for more information about how to hold and breastfeed your baby  Prevent biting  Your baby may get teeth at about 1to 3months of age  To help prevent biting, break his suction once he is finished or if he has fallen asleep  To break his suction, slip a finger into the side of his mouth  If your baby bites you, respond with surprise or unhappiness  Offer praise when he does not bite you  Breastfeed your baby regularly  Feed your baby 8 to 12 times a day  You may need to wake up your baby at night to feed him   It is okay to feed from 1 or both breasts at each feeding  Your baby should breastfeed from both breasts equally over the course of a day  If your baby only feeds from 1 side during a feeding, offer your other breast to him first for the next feeding  Schedule and keep follow-up visits  Talk to your baby's pediatrician or your PHP during follow-up visits if you have breast problems  Caregivers may suggest that you, or you and your partner, attend classes on breastfeeding  You also may want to join a breastfeeding support group  Caregivers may suggest that you see a lactation consultant  This is a caregiver who can help you with breastfeeding  Contact your PHP if:   You have a fever and chills  You have body aches and you feel like you do not have any energy  One or both of your breasts is red, swollen or hard, painful, and feels warm or hot  You have breast engorgement that does not get better within 24 hours  You see or feel a lump in your breast that hurts when you touch it  You have nipple pain during breastfeeding or between feedings  Your nipples are red, dry, cracked, or bleeding, or they have scabs on them  You have questions or concerns about your condition or care  © 2014 [address] is for End User's use only and may not be sold, redistributed or otherwise used for commercial purposes  All illustrations and images included in CareNotes® are the copyrighted property of [employer]  or [employer]inal[employer] Boone  The above information is an  only  It is not intended as medical advice for individual conditions or treatments  Talk to your doctor, nurse or pharmacist before following any medical regimen to see if it is safe and effective for you

## 2022-11-24 NOTE — PLAN OF CARE
Problem: POSTPARTUM  Goal: Experiences normal postpartum course  Description: INTERVENTIONS:  - Monitor maternal vital signs  - Assess uterine involution and lochia  Outcome: Progressing  Goal: Appropriate maternal -  bonding  Description: INTERVENTIONS:  - Identify family support  - Assess for appropriate maternal/infant bonding   -Encourage maternal/infant bonding opportunities  - Referral to  or  as needed  Outcome: Progressing  Goal: Establishment of infant feeding pattern  Description: INTERVENTIONS:  - Assess breast/bottle feeding  - Refer to lactation as needed  Outcome: Progressing  Goal: Incision(s), wounds(s) or drain site(s) healing without S/S of infection  Description: INTERVENTIONS  - Assess and document dressing, incision, wound bed, drain sites and surrounding tissue  - Provide patient and family education  Outcome: Progressing     Problem: PAIN - ADULT  Goal: Verbalizes/displays adequate comfort level or baseline comfort level  Description: Interventions:  - Encourage patient to monitor pain and request assistance  - Assess pain using appropriate pain scale  - Administer analgesics based on type and severity of pain and evaluate response  - Implement non-pharmacological measures as appropriate and evaluate response  - Consider cultural and social influences on pain and pain management  - Notify physician/advanced practitioner if interventions unsuccessful or patient reports new pain  Outcome: Progressing     Problem: INFECTION - ADULT  Goal: Absence or prevention of progression during hospitalization  Description: INTERVENTIONS:  - Assess and monitor for signs and symptoms of infection  - Monitor lab/diagnostic results  - Monitor all insertion sites, i e  indwelling lines, tubes, and drains  - Monitor endotracheal if appropriate and nasal secretions for changes in amount and color  - [place] appropriate cooling/warming therapies per order  - Administer medications as ordered  - Instruct and encourage patient and family to use good hand hygiene technique  - Identify and instruct in appropriate isolation precautions for identified infection/condition  Outcome: Progressing  Goal: Absence of fever/infection during neutropenic period  Description: INTERVENTIONS:  - Monitor WBC    Outcome: Progressing     Problem: SAFETY ADULT  Goal: Patient will remain free of falls  Description: INTERVENTIONS:  - Educate patient/family on patient safety including physical limitations  - Instruct patient to call for assistance with activity   - Consult OT/PT to assist with strengthening/mobility   - Keep Call bell within reach  - Keep bed low and locked with side rails adjusted as appropriate  - Keep care items and personal belongings within reach  - Initiate and maintain comfort rounds  - Make Fall Risk Sign visible to staff  - Apply yellow socks and bracelet for high fall risk patients  - Consider moving patient to room near nurses station  Outcome: Progressing  Goal: Maintain or return to baseline ADL function  Description: INTERVENTIONS:  -  Assess patient's ability to carry out ADLs; assess patient's baseline for ADL function and identify physical deficits which impact ability to perform ADLs (bathing, care of mouth/teeth, toileting, grooming, dressing, etc )  - Assess/evaluate cause of self-care deficits   - Assess range of motion  - Assess patient's mobility; develop plan if impaired  - Assess patient's need for assistive devices and provide as appropriate  - Encourage maximum independence but intervene and supervise when necessary  - Involve family in performance of ADLs  - Assess for home care needs following discharge   - Consider OT consult to assist with ADL evaluation and planning for discharge  - Provide patient education as appropriate  Outcome: Progressing  Goal: Maintains/Returns to pre admission functional level  Description: INTERVENTIONS:  - Perform BMAT or MOVE assessment daily    - Set and communicate daily mobility goal to care team and patient/family/caregiver  - Collaborate with rehabilitation services on mobility goals if consulted  - Out of bed for toileting  - Record patient progress and toleration of activity level   Outcome: Progressing     Problem: Knowledge Deficit  Goal: Patient/family/caregiver demonstrates understanding of disease process, treatment plan, medications, and discharge instructions  Description: Complete learning assessment and assess knowledge base    Interventions:  - Provide teaching at level of understanding  - Provide teaching via preferred learning methods  Outcome: Progressing     Problem: DISCHARGE PLANNING  Goal: Discharge to home or other facility with appropriate resources  Description: INTERVENTIONS:  - Identify barriers to discharge w/patient and caregiver  - Arrange for needed discharge resources and transportation as appropriate  - Identify discharge learning needs (meds, wound care, etc )  - Arrange for interpretive services to assist at discharge as needed  - Refer to Case Management Department for coordinating discharge planning if the patient needs post-hospital services based on physician/advanced practitioner order or complex needs related to functional status, cognitive ability, or social support system  Outcome: Progressing     Problem: ALTERATION IN THE BREAST  Goal: Optimize infant feeding at the breast  Description: INTERVENTIONS:  - Latch, breast and nipple assessment  - Assess prior breast feeding history  - Hand expression of breast milk  - For cracked, bleeding and or sore nipples reassess latch, treat damaged nipple  -Educate mother on feeding cues  -Positioning/latch techniques  Outcome: Progressing     Problem: INADEQUATE LATCH, SUCK OR SWALLOW  Goal: Demonstrate ability to latch and sustain latch, audible swallowing and satiety  Description: INTERVENTIONS:  - Assess oral anatomy, notify Physician/AP for abnormal findings  - Establish milk expression  - Maximize feeding opportunity (skin to skin, behavioral state)  - Position/latch techniques  - Discourage use of pacifier-artificial nipple  - Mechanical pumping  - Nipple Shield  - Supplemental formula feeding (Physician/AP order)  - Alternative feeding method  Outcome: Progressing

## 2022-11-24 NOTE — H&P
[address] [place] [address] 28 y o  female MRN: [id number]  Unit/Bed#: LD TRIAGE 2- Encounter: [id number]    Assessment: 28 y o  Nadja Jackman at 36w3d admitted for RLTCS    Plan:   Previous  delivery, antepartum  Assessment & Plan  Prior CS in  due to arrest of descent  Desires RLTCS  Discussed risks of  section, including risk of injury to bowels, bladder, vasculature, and other surrounding structures  Also discussed risks of TOLAC, including <1% risk of uterine rupture  EFW 83%, AC>95%, reviewed risks of shoulder dystocia      Pregnancy resulting from assisted reproductive technology  Assessment & Plan  IVF pregnancy ([place])    * 39 weeks gestation of pregnancy  Assessment & Plan  Type and screen, CBC, RPR  Two IV's  Penicillin for GBS prophylaxis until CS  Ancef for surgical prophylaxis          Dr Cruz Dk aware      Chief Complaint: contractions    HPI: Eleni Guy is a 28 y o  Nadja Jackman with an KOLTON of 2022, Date entered prior to episode creation at 36w3d who is being admitted for a RLTCS in the setting of labor  She reports strong  Contractions at home, as well as a small amount of vaginal bleeding  She denies any loss of fluid  Patient Active Problem List   Diagnosis   • Allergic rhinitis   • Tinnitus   • Thyroid disorder   • Thyroid nodule   • History of BCG vaccination   • Anemia during pregnancy in second trimester   • COVID-19 affecting pregnancy in third trimester   • HRP (high risk pregnancy)   • Pregnancy resulting from assisted reproductive technology   • Previous  delivery, antepartum   • 39 weeks gestation of pregnancy       Baby complications/comments: none    Review of Systems   Constitutional: Negative for chills and fever  HENT: Negative for ear pain and sore throat  Eyes: Negative for pain and visual disturbance  Respiratory: Negative for cough and shortness of breath  Cardiovascular: Negative for chest pain and palpitations     Gastrointestinal: Negative for abdominal pain and vomiting  Genitourinary: Negative for dysuria and hematuria  Musculoskeletal: Negative for arthralgias and back pain  Skin: Negative for color change and rash  Neurological: Negative for seizures and syncope  All other systems reviewed and are negative  OB History    Para Term  AB Living   2 1 1     1   SAB IAB Ectopic Multiple Live Births           1      # Outcome Date GA Lbr Sebastian/2nd Weight Sex Delivery Anes PTL Lv   2 Current            1 Term 14 39w0d  3515 g (7 lb 12 oz) M CS-Unspec EPI, Spinal  EVELYNE       Past Medical History:   Diagnosis Date   • Varicella     had chicken pox       Past Surgical History:   Procedure Laterality Date   •  SECTION  2014    x1   • TONSILLECTOMY         Social History     Tobacco Use   • Smoking status: Never   • Smokeless tobacco: Never   Substance Use Topics   • Alcohol use: No       No Known Allergies    Medications Prior to Admission   Medication   • Prenatal Vit-Fe Fumarate-FA (PRENATAL VITAMIN PO)       Objective:  Temp:  [97 6 °F (36 4 °C)] 97 6 °F (36 4 °C)  HR:  [91] 91  Resp:  [18] 18  BP: (116)/(71) 116/71  There is no height or weight on file to calculate BMI  Physical Exam:  Physical Exam  Constitutional:       Appearance: Normal appearance  HENT:      Head: Normocephalic and atraumatic  Cardiovascular:      Rate and Rhythm: Normal rate  Pulmonary:      Effort: Pulmonary effort is normal  No respiratory distress  Abdominal:      Palpations: Abdomen is soft  Tenderness: There is no abdominal tenderness  Musculoskeletal:         General: Normal range of motion  Neurological:      Mental Status: She is alert  Skin:     General: Skin is warm and dry              FHT:    baseline 130, moderate variability, 15x15 accels, no decels    TOCO:    contractions every 3-5 minutes    Lab Results   Component Value Date    WBC 8 7 10/29/2022    HGB 9 7 (L) 10/29/2022    HCT 29 8 (L) 10/29/2022     10/29/2022     Lab Results   Component Value Date    K 3 5 07/17/2022     07/17/2022    CO2 21 07/17/2022    BUN 9 07/17/2022    CREATININE 0 41 (L) 07/17/2022    AST 17 07/17/2022    ALT 21 07/17/2022     Prenatal Labs:     Blood Type:   Lab Results   Component Value Date/Time    ABO Grouping A 10/29/2022 08:23 AM    ABO Grouping A 04/28/2022 03:43 PM     , D (Rh type):   Lab Results   Component Value Date/Time    Rh Type RH(D) POSITIVE 10/29/2022 08:23 AM     HCT/HGB:   Lab Results   Component Value Date/Time    HCT 29 8 (L) 10/29/2022 08:23 AM    Hematocrit 31 5 (L) 07/17/2022 08:26 PM    Hemoglobin 9 7 (L) 10/29/2022 08:23 AM    Hemoglobin 11 2 (L) 07/17/2022 08:26 PM      , MCV:   Lab Results   Component Value Date/Time    MCV 82 8 10/29/2022 08:23 AM    MCV 87 07/17/2022 08:26 PM      , Platelets:   Lab Results   Component Value Date/Time    Platelet Count 659 31/70/2347 08:23 AM    Platelets 388 44/40/6612 08:26 PM      , 1 hour Glucola:   Lab Results   Component Value Date/Time    Glucose 159 (H) 10/29/2022 08:23 AM   ,Varicella:   Lab Results   Component Value Date/Time    Varicella Zoster, IgG 309 90 10/29/2022 08:23 AM      VDRL/RPR:   Lab Results   Component Value Date/Time    RPR NON-REACTIVE 10/29/2022 08:23 AM      , Urine Culture/Screen:   Lab Results   Component Value Date/Time    Urine Culture (A) 04/28/2022 03:45 PM     10,000-19,000 cfu/ml Beta Hemolytic Streptococcus Group B       ,, HIV:   Lab Results   Component Value Date/Time    HIV AG/AB, 4th Gen NON-REACTIVE 10/29/2022 08:23 AM     , Chlamydia: No results found for: EXTCHLAMYDIA  , Gonorrhea:   Lab Results   Component Value Date/Time    N gonorrhoeae, DNA Probe Negative 11/03/2022 09:35 AM              >2 Midnights  INPATIENT     Signature/Title: Elvira Huertas MD  Date: 11/24/2022  Time: 9:26 AM

## 2022-11-24 NOTE — ASSESSMENT & PLAN NOTE
, Hgb 10  --> post op Hgb 8 6, Venofer ordered on POD#1  Simons removed on POD#1,s/p vt  Pain: Tylenol and toradol scheduled, jerman 5/10 PRN    FEN: Tolerating regular diet  DVT ppx: SCDs and Lovenox 40mg qD  Passing flatus   Incision C/D/I

## 2022-11-24 NOTE — TELEPHONE ENCOUNTER
Reason for Disposition  • Vaginal bleeding or spotting (Exception: brief spotting after intercourse or pelvic exam)    Answer Assessment - Initial Assessment Questions  1  ONSET: "When did the symptoms begin?"         Yesterday   2  CONTRACTIONS: "Describe the contractions that you are having " (e g , duration, frequency, regularity, severity)       Regular, 3-5 minutes apart   3  KOLTON: "What date are you expecting to deliver?"      11/30/22   4  PARITY: "Have you had a baby before?" If Yes, ask: "How long did the labor last?"      Second baby   5  FETAL MOVEMENT: "Has the baby's movement decreased or changed significantly from normal?"      Cannot   6   OTHER SYMPTOMS: "Do you have any other symptoms?" (e g , leaking fluid from vagina, vaginal bleeding, fever, hand/facial swelling)      Vaginal bleeding    Protocols used: PREGNANCY - LABOR-ADULT-

## 2022-11-24 NOTE — TELEPHONE ENCOUNTER
Regardin wks pregnant/ sharp pains/ bleeding  ----- Message from Elmira Sinha sent at 2022  7:04 AM EST -----  " My wife is due on  and she called me and said that she is having sharp pains and bleeding "

## 2022-11-24 NOTE — DISCHARGE SUMMARY
Discharge Summary - Vanda Granda 28 y o  female MRN: [id number]    Unit/Bed#Helen Patel Encounter: [id number]    Admission Date: 2022     Discharge Date:  2022    Admitting Attending: Dr Cindy Morales  Delivering Attending: Dr Cindy Morales  Discharge Attending:  Dr Cindy Morales    Diagnosis:  1  Pregnancy 39 weeks gestation  2  IVF pregnancy  3  Elevated 1 hour GTT, normal 3 hour GTT  4  History of prior  section    Procedures: repeat low transverse  section     Complications: none apparent     Vanda Granda is a 27 yo  who was admitted for a RLTCS at 39w1d  She presented in labor and declined a TOLAC  She underwent an uncomplicated  delivery  She delivered a viable male  at 46 on 22  Weight was 8lbs 9oz (3885g) with APGARs of 8 and 9 at 1 and 5 minutes  Her preoperative Hb was 10 3 and her postoperative Hb was 8 6; the patient received Venofer  Her postoperative course was uncomplicated  Condition at discharge: good     On day of discharge pain was well controlled  The patient was tolerating PO, passing flatus, and demonstrating appropriate bowel function  She was discharged with standard post partum/ post operative instructions to follow up with her physician in 1 week for an incision check and in 3-6 weeks for a postpartum appointment  Discharge instructions/Information to patient and family:   -Do not place anything (no partner, tampons or douche) in your vagina for 6 weeks    -You may walk for exercise for the first 6 weeks then gradually return to your usual activities    -Please do not drive for 1 week if you have no stitches and for 2 weeks if you have stitches or underwent a  delivery     -You may take baths or shower per your preference    -Please look at your bust (breasts) in the mirror daily and call for redness or tenderness or increased warmth    -Please call us for temperature > 100 4*F or 38* C, worsening pain or a foul discharge       Discharge Medications:   Prenatal vitamin daily for 6 months or the duration of nursing whichever is longer  Motrin 600 mg orally every 6 hours as needed for pain  Tylenol (over the counter) per bottle directions as needed for pain: do NOT use with percocet  Hydrocortisone cream 1% (over the counter) applied 1-2x daily to hemorrhoids as needed  Percocet as needed    Provisions for Follow-Up Care: Follow up with your doctor in 1 week for incision site check      Planned Readmission:  No

## 2022-11-24 NOTE — OP NOTE
OPERATIVE REPORT  PATIENT NAME: Ronni Anthony    :  1995  MRN: [id number]  Pt Location: [facility] L&D OR ROOM 02    SURGERY DATE: 2022    Surgeon(s) and Role:     * Kedar Ravi MD - Primary     * Jessica Gonzalez MD - Assisting    Preop Diagnosis:  Premature uterine contractions causing threatened premature labor, antepartum, third trimester [O47 03]  Previous  section complicating pregnancy [A67 602]    Post-Op Diagnosis Codes:     * Premature uterine contractions causing threatened premature labor, antepartum, third trimester [O47 03]     * Previous  section complicating pregnancy [F83 166]    Procedure(s) (LRB):   SECTION () REPEAT (N/A)    Specimen(s):  ID Type Source Tests Collected by Time Destination   A : cord gases Cord Blood Cord BLOOD GAS, VENOUS, CORD, BLOOD GAS, ARTERIAL, CORD Carmel Harvey MD 2022 1141    B : placenta for storage Tissue (Placenta on Hold) OB Only Placenta PLACENTA IN STORAGE Carmel Harvey MD 2022 1143        QBL:     Drains:  Urethral Catheter Non-latex 16 Fr  (Active)   Reasons to continue Urinary Catheter  Post-operative urological requirements 22 1125   Goal for Removal Remove POD#1 22 1125   Site Assessment Clean;Skin intact 22 1125   Collection Container Standard drainage bag 22 1125   Securement Method Securing device (Describe) 22 1125   Number of days: 0       Anesthesia Type:   Spinal    Operative Indications:  Premature uterine contractions causing threatened premature labor, antepartum, third trimester [O47 03]  Previous  section complicating pregnancy [X78 217]    Complications:   None    Procedure and Technique:  Operative Findings:  1  Viable male  at 45 with APGARs of 8 and 9 at 1 and 5 minutes  Fetus weighted 8lb 9oz    2  Normal intact placenta with centrally inserted 3VC expressed at 1143    3  Omentum adhesed to anterior abdominal wall; normal uterus; normal bilateral tubes and ovaries  4  Blood gases:   Arterial pH: 7 149   Arterial base excess: -6 7   Venous pH: 7 184   Venous base excess: -7 1    The patient was taken to the operating room  Spinal anesthesia was adequately established and Ancef and Azithromycin were given for preoperative prophylaxis  The patient was then placed in the dorsal supine position with a left tilt of the hips  The patient was then prepped with chlorhexidine for vaginal prep and chloraprep for abdominal prep and draped in the usual sterile fashion for a Pfannenstiel skin incision  A time out was performed to confirm correct patient and correct procedure  An incision was made in the skin with a surgical scalpel and sharp dissection was carried out over subsequent layers of tissue including the fascia, followed by the Bovie electrocautery for hemostasis  The fascia was incised at the midline and the fascial incision was extended bilaterally using the curved Phillips scissors  The superior edge of the fascial incision was grasped with Kocher clamps, tented up and the underlying rectus muscles were dissected off bluntly and sharply using the scalpel  The rectus muscles were then divided at midline and the peritoneum was identified, tented up at its upper margin taking care to avoid the bladder, and then entered  The peritoneal incision was extended superiorly and inferiorly  The bladder blade was inserted and a transverse incision was made in the lower uterine segment using a new surgical blade  The uterine incision was extended cephalad and caudal using blunt dissection  The amniotic sac was entered and the amniotic fluid was noted to be clear  The surgeon's hand was placed into the uterine cavity  The fetal head was identified and elevated through the uterine incision with the assistance of fundal pressure  With gentle traction, the shoulder was delivered, followed by the rest of the fetal body   There was no nuchal cord noted  On delivery the cord was doubly clamped and cut after delayed cord clamping  The infant was then passed off the table to the awaiting  staff  The  was noted to cry spontaneously and moved all extremities  Venous and arterial blood gas, cord blood, and portion of cord was obtained for analysis and routine blood testing  The placenta delivery was then sent to storage  Placenta was noted to be intact with a centrally inserted three-vessel cord  Oxytocin was administered by IV infusion to enhance uterine contraction  The uterus was exteriorized and cleared of all clots and remaining products of conception  The uterine incision was re approximated using a 0 Monocryl in a running non-locked fashion  A second vertical imbricating stitch with 0 Monocryl was applied  The uterine incision was examined and noted to be hemostatic  The posterior cul-de-sac was cleared of all clots and products of conception  The uterus was replaced into the abdomen and the pericolic gutters were cleared of all clots  The uterine incision was once again reexamined and noted to be hemostatic  The fascia was re approximated using 0 Vicryl in a running nonlocked fashion  The subcutaneous tissue was irrigated and cleared of all clots and debris  Good hemostasis was noted with Bovie electrocautery  The subcutaneous  tissue was reapproximated with running 2-0 Plain suture  The skin incision was closed using 3-0 Stratafix with Exofin placed over top  Good hemostasis was noted  Patient tolerated the procedure well  All needle, sponge, and instrument counts were noted to be correct x 2 at the end of the procedure  Patient was transferred to the recovery room in stable condition  Dr Vale Lorenzana was present for the procedure        Patient Disposition:  PACU         SIGNATURE: Josefina Dyer MD  DATE: 2022  TIME: 12:31 PM

## 2022-11-24 NOTE — ANESTHESIA PROCEDURE NOTES
Spinal Block    Patient location during procedure: OR  Start time: 11/24/2022 11:20 AM  Reason for block: procedure for pain and at surgeon's request  Staffing  Performed: CRNA   Anesthesiologist: Jose D Agudelo MD  Resident/CRNA: Paz Smith CRNA  Preanesthetic Checklist  Completed: patient identified, IV checked, site marked, risks and benefits discussed, surgical consent, monitors and equipment checked, pre-op evaluation and timeout performed  Spinal Block  Patient position: sitting  Prep: ChloraPrep  Patient monitoring: cardiac monitor and frequent blood pressure checks  Approach: midline  Location: L3-4  Injection technique: single-shot  Needle  Needle type: pencil-tip   Needle gauge: 25 G  Needle length: 10 cm  Assessment  Sensory level: T4  Injection Assessment:  negative aspiration for heme, no paresthesia on injection and positive aspiration for clear CSF    Post-procedure:  adhesive bandage applied, pressure dressing applied, secured with tape, site cleaned and sterile dressing applied

## 2022-11-24 NOTE — ANESTHESIA PREPROCEDURE EVALUATION
Procedure:   SECTION () REPEAT (Uterus)    Relevant Problems   GYN   (+) 39 weeks gestation of pregnancy   (+) HRP (high risk pregnancy)      HEMATOLOGY   (+) Anemia during pregnancy in second trimester        Physical Exam    Airway    Mallampati score: I  TM Distance: >3 FB  Neck ROM: full     Dental   No notable dental hx     Cardiovascular      Pulmonary      Other Findings        Anesthesia Plan  ASA Score- 2     Anesthesia Type- spinal with ASA Monitors  Additional Monitors:   Airway Plan:           Plan Factors-Exercise tolerance (METS): >4 METS  Chart reviewed  Existing labs reviewed  Patient summary reviewed  Patient is not a current smoker  There is medical exclusion for perioperative obstructive sleep apnea risk education  Induction-     Postoperative Plan- Plan for postoperative opioid use  Informed Consent- Anesthetic plan and risks discussed with patient  I personally reviewed this patient with the CRNA  Discussed and agreed on the Anesthesia Plan with the CRNA  Ben Landa

## 2022-11-24 NOTE — TELEPHONE ENCOUNTER
Patient stated that she is calling her friend now to take her to the hospital  Patient will call 911 if unable to arrange the ride

## 2022-11-25 LAB
BASOPHILS # BLD AUTO: 0.02 THOUSANDS/ÂΜL (ref 0–0.1)
BASOPHILS NFR BLD AUTO: 0 % (ref 0–1)
EOSINOPHIL # BLD AUTO: 0.01 THOUSAND/ÂΜL (ref 0–0.61)
EOSINOPHIL NFR BLD AUTO: 0 % (ref 0–6)
ERYTHROCYTE [DISTWIDTH] IN BLOOD BY AUTOMATED COUNT: 15 % (ref 11.6–15.1)
HCT VFR BLD AUTO: 26.3 % (ref 34.8–46.1)
HGB BLD-MCNC: 8.6 G/DL (ref 11.5–15.4)
IMM GRANULOCYTES # BLD AUTO: 0.15 THOUSAND/UL (ref 0–0.2)
IMM GRANULOCYTES NFR BLD AUTO: 1 % (ref 0–2)
LYMPHOCYTES # BLD AUTO: 2.13 THOUSANDS/ÂΜL (ref 0.6–4.47)
LYMPHOCYTES NFR BLD AUTO: 15 % (ref 14–44)
MCH RBC QN AUTO: 26.8 PG (ref 26.8–34.3)
MCHC RBC AUTO-ENTMCNC: 32.7 G/DL (ref 31.4–37.4)
MCV RBC AUTO: 82 FL (ref 82–98)
MONOCYTES # BLD AUTO: 1.21 THOUSAND/ÂΜL (ref 0.17–1.22)
MONOCYTES NFR BLD AUTO: 9 % (ref 4–12)
NEUTROPHILS # BLD AUTO: 10.43 THOUSANDS/ÂΜL (ref 1.85–7.62)
NEUTS SEG NFR BLD AUTO: 75 % (ref 43–75)
NRBC BLD AUTO-RTO: 0 /100 WBCS
PLATELET # BLD AUTO: 158 THOUSANDS/UL (ref 149–390)
PMV BLD AUTO: 11.3 FL (ref 8.9–12.7)
RBC # BLD AUTO: 3.21 MILLION/UL (ref 3.81–5.12)
WBC # BLD AUTO: 13.95 THOUSAND/UL (ref 4.31–10.16)

## 2022-11-25 RX ORDER — OXYCODONE HYDROCHLORIDE 10 MG/1
10 TABLET ORAL EVERY 4 HOURS PRN
Status: DISCONTINUED | OUTPATIENT
Start: 2022-11-25 | End: 2022-11-27 | Stop reason: HOSPADM

## 2022-11-25 RX ORDER — ENOXAPARIN SODIUM 100 MG/ML
40 INJECTION SUBCUTANEOUS
Status: DISCONTINUED | OUTPATIENT
Start: 2022-11-25 | End: 2022-11-27 | Stop reason: HOSPADM

## 2022-11-25 RX ORDER — OXYCODONE HYDROCHLORIDE 5 MG/1
5 TABLET ORAL EVERY 4 HOURS PRN
Status: DISCONTINUED | OUTPATIENT
Start: 2022-11-25 | End: 2022-11-27 | Stop reason: HOSPADM

## 2022-11-25 RX ORDER — IBUPROFEN 600 MG/1
600 TABLET ORAL EVERY 6 HOURS SCHEDULED
Status: DISCONTINUED | OUTPATIENT
Start: 2022-11-25 | End: 2022-11-27 | Stop reason: HOSPADM

## 2022-11-25 RX ADMIN — KETOROLAC TROMETHAMINE 30 MG: 30 INJECTION, SOLUTION INTRAMUSCULAR at 09:31

## 2022-11-25 RX ADMIN — ACETAMINOPHEN 650 MG: 325 TABLET, FILM COATED ORAL at 17:27

## 2022-11-25 RX ADMIN — DOCUSATE SODIUM 100 MG: 100 CAPSULE, LIQUID FILLED ORAL at 09:31

## 2022-11-25 RX ADMIN — ACETAMINOPHEN 650 MG: 325 TABLET, FILM COATED ORAL at 06:13

## 2022-11-25 RX ADMIN — DIPHENHYDRAMINE HYDROCHLORIDE 25 MG: 25 TABLET ORAL at 21:54

## 2022-11-25 RX ADMIN — KETOROLAC TROMETHAMINE 30 MG: 30 INJECTION, SOLUTION INTRAMUSCULAR at 02:54

## 2022-11-25 RX ADMIN — SIMETHICONE 80 MG: 80 TABLET, CHEWABLE ORAL at 09:32

## 2022-11-25 RX ADMIN — SODIUM CHLORIDE 200 MG: 900 INJECTION, SOLUTION INTRAVENOUS at 10:51

## 2022-11-25 RX ADMIN — ACETAMINOPHEN 650 MG: 325 TABLET, FILM COATED ORAL at 11:50

## 2022-11-25 RX ADMIN — ACETAMINOPHEN 650 MG: 325 TABLET, FILM COATED ORAL at 23:57

## 2022-11-25 RX ADMIN — NALBUPHINE HYDROCHLORIDE 10 MG: 10 INJECTION, SOLUTION INTRAMUSCULAR; INTRAVENOUS; SUBCUTANEOUS at 04:25

## 2022-11-25 RX ADMIN — DOCUSATE SODIUM 100 MG: 100 CAPSULE, LIQUID FILLED ORAL at 17:26

## 2022-11-25 RX ADMIN — ENOXAPARIN SODIUM 40 MG: 40 INJECTION SUBCUTANEOUS at 09:31

## 2022-11-25 NOTE — PROGRESS NOTES
Progress Note - OB/GYN  Collette Hire 28 y o  female MRN: [id number]  Unit/Bed#:  318-01 Encounter: [id number]    Assessment and Plan     Collette Hire is a patient of: [facility]  She is PPD# 1 s/p  repeat  section, low transverse incision  Recovering well and is stable       Pregnancy resulting from assisted reproductive technology  Assessment & Plan  IVF pregnancy ([place])    * S/P  section  Assessment & Plan  , Hgb 10  --> post op Hgb 8 6, Venofer ordered on POD#1  Simons removed on POD#1, pending VT today  Pain: Tylenol and toradol scheduled, jerman 5/10 PRN    FEN: Tolerating regular diet  DVT ppx: SCDs and Lovenox 40mg qD  Passing flatus   Incision C/D/I         Disposition    - Anticipate discharge home on PPD#2-3       Subjective/Objective     Chief Complaint: Postpartum State     Subjective:    Collette Hire is PPD/POD#1 s/p  repeat  section, low transverse incision  She has no current complaints  Pain is well controlled  Patient is currently voiding  She is ambulating  Patient is not currently passing flatus and has had no bowel movement  She is tolerating PO, and denies nausea or vomitting  Patient denies fever, chills, chest pain, shortness of breath, or calf tenderness  Lochia is normal  She is  Breastfeeding  She is recovering well and is stable  Vitals:   BP 92/53 (BP Location: Left arm)   Pulse 80   Temp 97 6 °F (36 4 °C) (Oral)   Resp 18   Ht 5' 3" (1 6 m)   Wt 88 5 kg (195 lb)   LMP 2021 (Exact Date)   SpO2 96%   Breastfeeding Yes   BMI 34 54 kg/m²       Intake/Output Summary (Last 24 hours) at 2022 9819  Last data filed at 2022 0301  Gross per 24 hour   Intake 2900 ml   Output 3400 ml   Net -500 ml       Invasive Devices     Peripheral Intravenous Line  Duration           Peripheral IV 22 Dorsal (posterior); Right Hand <1 day                Physical Exam:   GEN: Collette Hire appears well, alert and oriented x 3, pleasant and cooperative   CARDIO: RRR, no murmurs or rubs  RESP:  CTAB, no wheezes or rales  ABDOMEN: soft, no tenderness, no distention, fundus @ umbilicus, Incision C/D/I  EXTREMITIES: SCDs on, non tender, no erythema      Labs:     Hemoglobin   Date Value Ref Range Status   11/25/2022 8 6 (L) 11 5 - 15 4 g/dL Final   11/24/2022 10 3 (L) 11 5 - 15 4 g/dL Final     WBC   Date Value Ref Range Status   11/25/2022 13 95 (H) 4 31 - 10 16 Thousand/uL Final   11/24/2022 11 16 (H) 4 31 - 10 16 Thousand/uL Final     Platelets   Date Value Ref Range Status   11/25/2022 158 149 - 390 Thousands/uL Final   11/24/2022 183 149 - 390 Thousands/uL Final     Creatinine   Date Value Ref Range Status   07/17/2022 0 41 (L) 0 60 - 1 30 mg/dL Final     Comment:     Standardized to IDMS reference method   07/06/2022 0 80 0 60 - 1 30 mg/dL Final     Comment:     Standardized to IDMS reference method     AST   Date Value Ref Range Status   07/17/2022 17 13 - 39 U/L Final     Comment:     Specimen collection should occur prior to Sulfasalazine administration due to the potential for falsely depressed results  07/06/2022 16 13 - 39 U/L Final     Comment:     Specimen collection should occur prior to Sulfasalazine administration due to the potential for falsely depressed results  01/06/2022 17 10 - 30 U/L Final     ALT   Date Value Ref Range Status   07/17/2022 21 7 - 52 U/L Final     Comment:     Specimen collection should occur prior to Sulfasalazine administration due to the potential for falsely depressed results  07/06/2022 18 7 - 52 U/L Final     Comment:     Specimen collection should occur prior to Sulfasalazine administration due to the potential for falsely depressed results      01/06/2022 16 6 - 29 U/L Final          Vladimir Tidwell DO  11/25/2022  6:21 AM

## 2022-11-25 NOTE — LACTATION NOTE
This note was copied from a baby's chart  CONSULT - LACTATION  Baby Boy Lurlean Bowels) Dkhissi 1 days male MRN: [id number]    [facility] NURSERY Room / Bed: (N)/(N) Encounter: [id number]    Maternal Information     MOTHER:  Reagan Montenegro  Maternal Age: 28 y o    OB History: # 1 - Date: 14, Sex: Male, Weight: 3515 g (7 lb 12 oz), GA: 39w0d, Delivery: , Unspecified, Apgar1: None, Apgar5: None, Living: Living, Birth Comments: None    # 2 - Date: 22, Sex: Male, Weight: 3885 g (8 lb 9 oz), GA: 39w1d, Delivery: , Low Transverse, Apgar1: 8, Apgar5: 9, Living: Living, Birth Comments: None   Previouse breast reduction surgery? No  Lactation history:   Has patient previously breast fed: Yes   How long had patient previously breast fed: 3 mo   Previous breast feeding complications: Low milk supply (early supplementation, baby "preferred" formula)     Past Surgical History:   Procedure Laterality Date   •  SECTION  2014    x1   • TONSILLECTOMY          Birth information:  YOB: 2023   Time of birth: 11:40 AM   Sex: male   Delivery type: , Low Transverse   Birth Weight: 3885 g (8 lb 9 oz)   Percent of Weight Change: -2%     Gestational Age: 36w3d   [unfilled]      Feeding recommendations:  breast feed on demand     Met with mother  Provided mother with Ready, Set, Baby booklet  Discussed Skin to Skin contact an benefits to mom and baby  Talked about the delay of the first bath until baby has adjusted  Spoke about the benefits of rooming in  Feeding on cue and what that means for recognizing infant's hunger  Avoidance of pacifiers for the first month discussed  Talked about exclusive breastfeeding for the first 6 months  Positioning and latch reviewed as well as showing images of other feeding positions  Discussed the properties of a good latch in any position  Reviewed hand/manual expression    Discussed s/s that baby is getting enough milk and some s/s that breastfeeding dyad may need further help  Gave information on common concerns, what to expect the first few weeks after delivery, preparing for other caregivers, and how partners can help  Resources for support also provided  Information on hand expression given  Discussed benefits of knowing how to manually express breast including stimulating milk supply, softening nipple for latch and evacuating breast in the event of engorgement  Discussed 2nd night syndrome and ways to calm infant  Hand out given  Provided DC booklet at this time, enc family to review and prepare questions for day of DC  Consult place for SS2       Leydi Jones RN 11/25/2022 3:26 PM

## 2022-11-25 NOTE — PLAN OF CARE
Problem: POSTPARTUM  Goal: Experiences normal postpartum course  Description: INTERVENTIONS:  - Monitor maternal vital signs  - Assess uterine involution and lochia  Outcome: Progressing  Goal: Appropriate maternal -  bonding  Description: INTERVENTIONS:  - Identify family support  - Assess for appropriate maternal/infant bonding   -Encourage maternal/infant bonding opportunities  - Referral to  or  as needed  Outcome: Progressing  Goal: Establishment of infant feeding pattern  Description: INTERVENTIONS:  - Assess breast/bottle feeding  - Refer to lactation as needed  Outcome: Progressing  Goal: Incision(s), wounds(s) or drain site(s) healing without S/S of infection  Description: INTERVENTIONS  - Assess and document dressing, incision, wound bed, drain sites and surrounding tissue  - Provide patient and family education    Outcome: Progressing     Problem: PAIN - ADULT  Goal: Verbalizes/displays adequate comfort level or baseline comfort level  Description: Interventions:  - Encourage patient to monitor pain and request assistance  - Assess pain using appropriate pain scale  - Administer analgesics based on type and severity of pain and evaluate response  - Implement non-pharmacological measures as appropriate and evaluate response  - Consider cultural and social influences on pain and pain management  - Notify physician/advanced practitioner if interventions unsuccessful or patient reports new pain  Outcome: Progressing     Problem: INFECTION - ADULT  Goal: Absence or prevention of progression during hospitalization  Description: INTERVENTIONS:  - Assess and monitor for signs and symptoms of infection  - Monitor lab/diagnostic results  - Monitor all insertion sites, i e  indwelling lines, tubes, and drains  - Monitor endotracheal if appropriate and nasal secretions for changes in amount and color  - [place] appropriate cooling/warming therapies per order  - Administer medications as ordered  - Instruct and encourage patient and family to use good hand hygiene technique  - Identify and instruct in appropriate isolation precautions for identified infection/condition  Outcome: Progressing  Goal: Absence of fever/infection during neutropenic period  Description: INTERVENTIONS:  - Monitor WBC    Outcome: Progressing     Problem: SAFETY ADULT  Goal: Patient will remain free of falls  Description: INTERVENTIONS:  - Educate patient/family on patient safety including physical limitations  - Instruct patient to call for assistance with activity   - Consult OT/PT to assist with strengthening/mobility   - Keep Call bell within reach  - Keep bed low and locked with side rails adjusted as appropriate  - Keep care items and personal belongings within reach  - Initiate and maintain comfort rounds  - Make Fall Risk Sign visible to staff  - Apply yellow socks and bracelet for high fall risk patients  - Consider moving patient to room near nurses station  Outcome: Progressing  Goal: Maintain or return to baseline ADL function  Description: INTERVENTIONS:  -  Assess patient's ability to carry out ADLs; assess patient's baseline for ADL function and identify physical deficits which impact ability to perform ADLs (bathing, care of mouth/teeth, toileting, grooming, dressing, etc )  - Assess/evaluate cause of self-care deficits   - Assess range of motion  - Assess patient's mobility; develop plan if impaired  - Assess patient's need for assistive devices and provide as appropriate  - Encourage maximum independence but intervene and supervise when necessary  - Involve family in performance of ADLs  - Assess for home care needs following discharge   - Consider OT consult to assist with ADL evaluation and planning for discharge  - Provide patient education as appropriate  Outcome: Progressing  Goal: Maintains/Returns to pre admission functional level  Description: INTERVENTIONS:  - Perform BMAT or MOVE assessment daily    - Set and communicate daily mobility goal to care team and patient/family/caregiver  - Collaborate with rehabilitation services on mobility goals if consulted  - Out of bed for toileting  - Record patient progress and toleration of activity level   Outcome: Progressing     Problem: Knowledge Deficit  Goal: Patient/family/caregiver demonstrates understanding of disease process, treatment plan, medications, and discharge instructions  Description: Complete learning assessment and assess knowledge base    Interventions:  - Provide teaching at level of understanding  - Provide teaching via preferred learning methods  Outcome: Progressing     Problem: DISCHARGE PLANNING  Goal: Discharge to home or other facility with appropriate resources  Description: INTERVENTIONS:  - Identify barriers to discharge w/patient and caregiver  - Arrange for needed discharge resources and transportation as appropriate  - Identify discharge learning needs (meds, wound care, etc )  - Arrange for interpretive services to assist at discharge as needed  - Refer to Case Management Department for coordinating discharge planning if the patient needs post-hospital services based on physician/advanced practitioner order or complex needs related to functional status, cognitive ability, or social support system  Outcome: Progressing     Problem: ALTERATION IN THE BREAST  Goal: Optimize infant feeding at the breast  Description: INTERVENTIONS:  - Latch, breast and nipple assessment  - Assess prior breast feeding history  - Hand expression of breast milk  - For cracked, bleeding and or sore nipples reassess latch, treat damaged nipple  -Educate mother on feeding cues  -Positioning/latch techniques  Outcome: Progressing     Problem: INADEQUATE LATCH, SUCK OR SWALLOW  Goal: Demonstrate ability to latch and sustain latch, audible swallowing and satiety  Description: INTERVENTIONS:  - Assess oral anatomy, notify Physician/AP for abnormal findings  - Establish milk expression  - Maximize feeding opportunity (skin to skin, behavioral state)  - Position/latch techniques  - Discourage use of pacifier-artificial nipple  - Mechanical pumping  - Nipple Shield  - Supplemental formula feeding (Physician/AP order)  - Alternative feeding method  Outcome: Progressing

## 2022-11-25 NOTE — PLAN OF CARE
Problem: POSTPARTUM  Goal: Experiences normal postpartum course  Description: INTERVENTIONS:  - Monitor maternal vital signs  - Assess uterine involution and lochia  Outcome: Progressing  Goal: Appropriate maternal -  bonding  Description: INTERVENTIONS:  - Identify family support  - Assess for appropriate maternal/infant bonding   -Encourage maternal/infant bonding opportunities  - Referral to  or  as needed  Outcome: Progressing  Goal: Establishment of infant feeding pattern  Description: INTERVENTIONS:  - Assess breast/bottle feeding  - Refer to lactation as needed  Outcome: Progressing  Goal: Incision(s), wounds(s) or drain site(s) healing without S/S of infection  Description: INTERVENTIONS  - Assess and document dressing, incision, wound bed, drain sites and surrounding tissue  - Provide patient and family education  Outcome: Progressing     Problem: PAIN - ADULT  Goal: Verbalizes/displays adequate comfort level or baseline comfort level  Description: Interventions:  - Encourage patient to monitor pain and request assistance  - Assess pain using appropriate pain scale  - Administer analgesics based on type and severity of pain and evaluate response  - Implement non-pharmacological measures as appropriate and evaluate response  - Consider cultural and social influences on pain and pain management  - Notify physician/advanced practitioner if interventions unsuccessful or patient reports new pain  Outcome: Progressing     Problem: INFECTION - ADULT  Goal: Absence or prevention of progression during hospitalization  Description: INTERVENTIONS:  - Assess and monitor for signs and symptoms of infection  - Monitor lab/diagnostic results  - Monitor all insertion sites, i e  indwelling lines, tubes, and drains  - Monitor endotracheal if appropriate and nasal secretions for changes in amount and color  - [place] appropriate cooling/warming therapies per order  - Administer medications as ordered  - Instruct and encourage patient and family to use good hand hygiene technique  - Identify and instruct in appropriate isolation precautions for identified infection/condition  Outcome: Progressing  Goal: Absence of fever/infection during neutropenic period  Description: INTERVENTIONS:  - Monitor WBC    Outcome: Progressing     Problem: SAFETY ADULT  Goal: Patient will remain free of falls  Description: INTERVENTIONS:  - Educate patient/family on patient safety including physical limitations  - Instruct patient to call for assistance with activity   - Consult OT/PT to assist with strengthening/mobility   - Keep Call bell within reach  - Keep bed low and locked with side rails adjusted as appropriate  - Keep care items and personal belongings within reach  - Initiate and maintain comfort rounds  - Make Fall Risk Sign visible to staff  - Apply yellow socks and bracelet for high fall risk patients  - Consider moving patient to room near nurses station  Outcome: Progressing  Goal: Maintain or return to baseline ADL function  Description: INTERVENTIONS:  -  Assess patient's ability to carry out ADLs; assess patient's baseline for ADL function and identify physical deficits which impact ability to perform ADLs (bathing, care of mouth/teeth, toileting, grooming, dressing, etc )  - Assess/evaluate cause of self-care deficits   - Assess range of motion  - Assess patient's mobility; develop plan if impaired  - Assess patient's need for assistive devices and provide as appropriate  - Encourage maximum independence but intervene and supervise when necessary  - Involve family in performance of ADLs  - Assess for home care needs following discharge   - Consider OT consult to assist with ADL evaluation and planning for discharge  - Provide patient education as appropriate  Outcome: Progressing  Goal: Maintains/Returns to pre admission functional level  Description: INTERVENTIONS:  - Perform BMAT or MOVE assessment daily    - Set and communicate daily mobility goal to care team and patient/family/caregiver  - Out of bed for toileting  - Record patient progress and toleration of activity level   Outcome: Progressing     Problem: Knowledge Deficit  Goal: Patient/family/caregiver demonstrates understanding of disease process, treatment plan, medications, and discharge instructions  Description: Complete learning assessment and assess knowledge base    Interventions:  - Provide teaching at level of understanding  - Provide teaching via preferred learning methods  Outcome: Progressing     Problem: DISCHARGE PLANNING  Goal: Discharge to home or other facility with appropriate resources  Description: INTERVENTIONS:  - Identify barriers to discharge w/patient and caregiver  - Arrange for needed discharge resources and transportation as appropriate  - Identify discharge learning needs (meds, wound care, etc )  - Arrange for interpretive services to assist at discharge as needed  - Refer to Case Management Department for coordinating discharge planning if the patient needs post-hospital services based on physician/advanced practitioner order or complex needs related to functional status, cognitive ability, or social support system  Outcome: Progressing     Problem: ALTERATION IN THE BREAST  Goal: Optimize infant feeding at the breast  Description: INTERVENTIONS:  - Latch, breast and nipple assessment  - Assess prior breast feeding history  - Hand expression of breast milk  - For cracked, bleeding and or sore nipples reassess latch, treat damaged nipple  -Educate mother on feeding cues  -Positioning/latch techniques  Outcome: Progressing     Problem: INADEQUATE LATCH, SUCK OR SWALLOW  Goal: Demonstrate ability to latch and sustain latch, audible swallowing and satiety  Description: INTERVENTIONS:  - Assess oral anatomy, notify Physician/AP for abnormal findings  - Establish milk expression  - Maximize feeding opportunity (skin to skin, behavioral state)  - Position/latch techniques  - Discourage use of pacifier-artificial nipple  - Mechanical pumping  - Nipple Shield  - Supplemental formula feeding (Physician/AP order)  - Alternative feeding method  Outcome: Progressing

## 2022-11-26 LAB
DME PARACHUTE DELIVERY DATE REQUESTED: NORMAL
DME PARACHUTE DELIVERY NOTE: NORMAL
DME PARACHUTE ITEM DESCRIPTION: NORMAL
DME PARACHUTE ORDER STATUS: NORMAL
DME PARACHUTE SUPPLIER NAME: NORMAL
DME PARACHUTE SUPPLIER PHONE: NORMAL

## 2022-11-26 RX ADMIN — IBUPROFEN 600 MG: 600 TABLET, FILM COATED ORAL at 02:12

## 2022-11-26 RX ADMIN — DOCUSATE SODIUM 100 MG: 100 CAPSULE, LIQUID FILLED ORAL at 17:44

## 2022-11-26 RX ADMIN — ACETAMINOPHEN 650 MG: 325 TABLET, FILM COATED ORAL at 17:44

## 2022-11-26 RX ADMIN — SIMETHICONE 80 MG: 80 TABLET, CHEWABLE ORAL at 21:28

## 2022-11-26 RX ADMIN — OXYCODONE HYDROCHLORIDE 5 MG: 5 TABLET ORAL at 21:28

## 2022-11-26 RX ADMIN — ENOXAPARIN SODIUM 40 MG: 40 INJECTION SUBCUTANEOUS at 10:09

## 2022-11-26 RX ADMIN — IBUPROFEN 600 MG: 600 TABLET, FILM COATED ORAL at 07:45

## 2022-11-26 RX ADMIN — DOCUSATE SODIUM 100 MG: 100 CAPSULE, LIQUID FILLED ORAL at 10:09

## 2022-11-26 RX ADMIN — ACETAMINOPHEN 650 MG: 325 TABLET, FILM COATED ORAL at 06:45

## 2022-11-26 RX ADMIN — OXYCODONE HYDROCHLORIDE 5 MG: 5 TABLET ORAL at 11:57

## 2022-11-26 RX ADMIN — OXYCODONE HYDROCHLORIDE 10 MG: 10 TABLET ORAL at 00:04

## 2022-11-26 RX ADMIN — IBUPROFEN 600 MG: 600 TABLET, FILM COATED ORAL at 20:41

## 2022-11-26 RX ADMIN — IBUPROFEN 600 MG: 600 TABLET, FILM COATED ORAL at 14:28

## 2022-11-26 NOTE — PLAN OF CARE
Problem: POSTPARTUM  Goal: Experiences normal postpartum course  Description: INTERVENTIONS:  - Monitor maternal vital signs  - Assess uterine involution and lochia  Outcome: Progressing  Goal: Appropriate maternal -  bonding  Description: INTERVENTIONS:  - Identify family support  - Assess for appropriate maternal/infant bonding   -Encourage maternal/infant bonding opportunities  - Referral to  or  as needed  Outcome: Progressing  Goal: Establishment of infant feeding pattern  Description: INTERVENTIONS:  - Assess breast/bottle feeding  - Refer to lactation as needed  Outcome: Progressing  Goal: Incision(s), wounds(s) or drain site(s) healing without S/S of infection  Description: INTERVENTIONS  - Assess and document dressing, incision, wound bed, drain sites and surrounding tissue  - Provide patient and family education  Outcome: Progressing     Problem: PAIN - ADULT  Goal: Verbalizes/displays adequate comfort level or baseline comfort level  Description: Interventions:  - Encourage patient to monitor pain and request assistance  - Assess pain using appropriate pain scale  - Administer analgesics based on type and severity of pain and evaluate response  - Implement non-pharmacological measures as appropriate and evaluate response  - Consider cultural and social influences on pain and pain management  - Notify physician/advanced practitioner if interventions unsuccessful or patient reports new pain  Outcome: Progressing     Problem: INFECTION - ADULT  Goal: Absence or prevention of progression during hospitalization  Description: INTERVENTIONS:  - Assess and monitor for signs and symptoms of infection  - Monitor lab/diagnostic results  - Monitor all insertion sites, i e  indwelling lines, tubes, and drains  - [place] appropriate cooling/warming therapies per order  - Administer medications as ordered  - Instruct and encourage patient and family to use good hand hygiene technique  Outcome: Progressing  Goal: Absence of fever/infection during neutropenic period  Description: INTERVENTIONS:  - Monitor WBC    Outcome: Progressing     Problem: SAFETY ADULT  Goal: Patient will remain free of falls  Description: INTERVENTIONS:  - Educate patient/family on patient safety including physical limitations  - Instruct patient to call for assistance with activity   - Consult OT/PT to assist with strengthening/mobility   - Keep Call bell within reach  - Keep bed low and locked with side rails adjusted as appropriate  - Keep care items and personal belongings within reach  - Initiate and maintain comfort rounds  Outcome: Progressing  Goal: Maintain or return to baseline ADL function  Description: INTERVENTIONS:  -  Assess patient's ability to carry out ADLs; assess patient's baseline for ADL function and identify physical deficits which impact ability to perform ADLs (bathing, care of mouth/teeth, toileting, grooming, dressing, etc )  - Assess/evaluate cause of self-care deficits   - Assess range of motion  - Assess patient's mobility; develop plan if impaired  - Assess patient's need for assistive devices and provide as appropriate  - Encourage maximum independence but intervene and supervise when necessary  - Involve family in performance of ADLs  - Assess for home care needs following discharge   - Consider OT consult to assist with ADL evaluation and planning for discharge  - Provide patient education as appropriate  Outcome: Progressing  Goal: Maintains/Returns to pre admission functional level  Description: INTERVENTIONS:  - Perform BMAT or MOVE assessment daily    - Set and communicate daily mobility goal to care team and patient/family/caregiver     - Out of bed for toileting  - Record patient progress and toleration of activity level   Outcome: Progressing     Problem: Knowledge Deficit  Goal: Patient/family/caregiver demonstrates understanding of disease process, treatment plan, medications, and discharge instructions  Description: Complete learning assessment and assess knowledge base    Interventions:  - Provide teaching at level of understanding  - Provide teaching via preferred learning methods  Outcome: Progressing     Problem: DISCHARGE PLANNING  Goal: Discharge to home or other facility with appropriate resources  Description: INTERVENTIONS:  - Identify barriers to discharge w/patient and caregiver  - Arrange for needed discharge resources and transportation as appropriate  - Identify discharge learning needs (meds, wound care, etc )  - Refer to Case Management Department for coordinating discharge planning if the patient needs post-hospital services based on physician/advanced practitioner order or complex needs related to functional status, cognitive ability, or social support system  Outcome: Progressing     Problem: ALTERATION IN THE BREAST  Goal: Optimize infant feeding at the breast  Description: INTERVENTIONS:  - Latch, breast and nipple assessment  - Assess prior breast feeding history  - Hand expression of breast milk  - For cracked, bleeding and or sore nipples reassess latch, treat damaged nipple  -Educate mother on feeding cues  -Positioning/latch techniques  Outcome: Progressing     Problem: INADEQUATE LATCH, SUCK OR SWALLOW  Goal: Demonstrate ability to latch and sustain latch, audible swallowing and satiety  Description: INTERVENTIONS:  - Assess oral anatomy, notify Physician/AP for abnormal findings  - Establish milk expression  - Maximize feeding opportunity (skin to skin, behavioral state)  - Position/latch techniques  - Discourage use of pacifier-artificial nipple  - Mechanical pumping  - Nipple Shield  - Supplemental formula feeding (Physician/AP order)  - Alternative feeding method  Outcome: Progressing

## 2022-11-26 NOTE — CASE MANAGEMENT
Case Management Discharge Planning Note    Patient name Sherry Crowley  Location /-93 MRN [id number]  : 1995 Date 2022       Current Admission Date: 2022  Current Admission Diagnosis:S/P  section   Patient Active Problem List    Diagnosis Date Noted   • S/P  section 10/28/2022   • COVID-19 affecting pregnancy in third trimester 2022   • Anemia during pregnancy in second trimester 2022   • Previous  delivery, antepartum 2022   • HRP (high risk pregnancy) 2022   • Pregnancy resulting from assisted reproductive technology 2022   • History of BCG vaccination 2022   • Thyroid nodule 2020   • Thyroid disorder 2018   • Allergic rhinitis 2017   • Tinnitus 10/09/2017      LOS (days): 2  Geometric Mean LOS (GMLOS) (days):   Days to GMLOS:     OBJECTIVE:  Risk of Unplanned Readmission Score: 8 37         Current admission status: Inpatient   Preferred Pharmacy:   [facility]/pharmacy #[id number]- [place] [address], [address] [address]  Phone: [phone] Fax: [phone]    Primary Care Provider: Brittani Urias DO    Primary Insurance: BLUE CROSS  Secondary Insurance:     DISCHARGE DETAILS:  CM sent referral via parachute to [address] for Spectra S2 breast pump  CM delivered breast pump to bedside  FOB signed delivery ticket and provided a copy at bedside  No other CM needs noted at this time

## 2022-11-26 NOTE — PROGRESS NOTES
Progress Note - OB/GYN  Lj Neri 28 y o  female MRN: [id number]  Unit/Bed#: -01 Encounter: [id number]    Assessment and Plan     Lj Neri is a patient of: [facility]  She is PPD# 2 s/p  repeat  section, low transverse incision  Recovering well and is stable       Pregnancy resulting from assisted reproductive technology  Assessment & Plan  IVF pregnancy ([place])    * S/P  section  Assessment & Plan  , Hgb 10  --> post op Hgb 8 6, Venofer ordered on POD#1  Simons removed on POD#1,s/p vt  Pain: Tylenol and toradol scheduled, jerman 5/10 PRN    FEN: Tolerating regular diet  DVT ppx: SCDs and Lovenox 40mg qD  Passing flatus   Incision C/D/I           Disposition    - Anticipate discharge home on PPD# 3      Subjective/Objective     Chief Complaint: Postpartum State     Subjective:    Lj Neri is POD#2 s/p  repeat  section, low transverse incision  She she currently complains of left-sided upper thigh pain which she attributes to not walking around much as she usually does  Pain is well controlled  Patient is currently voiding  She is ambulating  Patient is currently passing flatus and has had bowel movement  She is tolerating PO, and denies nausea or vomitting  Patient denies fever, chills, chest pain, shortness of breath, or calf tenderness  Lochia is normal  She is  Breastfeeding  She is recovering well and is stable         Vitals:   BP 99/52 (BP Location: Left arm)   Pulse 97   Temp 98 1 °F (36 7 °C) (Oral)   Resp 18   Ht 5' 3" (1 6 m)   Wt 88 5 kg (195 lb)   LMP 2021 (Exact Date)   SpO2 97%   Breastfeeding Yes   BMI 34 54 kg/m²       Intake/Output Summary (Last 24 hours) at 2022 0844  Last data filed at 2022 1410  Gross per 24 hour   Intake --   Output 2000 ml   Net -2000 ml       Invasive Devices     None                 Physical Exam:   GEN: Lj Neri appears well, alert and oriented x 3, pleasant and cooperative CARDIO: RRR, no murmurs or rubs  RESP:  CTAB, no wheezes or rales  ABDOMEN: soft, no tenderness, no distention, fundus @ U-1, Incision C/D/I  EXTREMITIES:  non tender, no erythema      Labs:     Hemoglobin   Date Value Ref Range Status   11/25/2022 8 6 (L) 11 5 - 15 4 g/dL Final   11/24/2022 10 3 (L) 11 5 - 15 4 g/dL Final     WBC   Date Value Ref Range Status   11/25/2022 13 95 (H) 4 31 - 10 16 Thousand/uL Final   11/24/2022 11 16 (H) 4 31 - 10 16 Thousand/uL Final     Platelets   Date Value Ref Range Status   11/25/2022 158 149 - 390 Thousands/uL Final   11/24/2022 183 149 - 390 Thousands/uL Final     Creatinine   Date Value Ref Range Status   07/17/2022 0 41 (L) 0 60 - 1 30 mg/dL Final     Comment:     Standardized to IDMS reference method   07/06/2022 0 80 0 60 - 1 30 mg/dL Final     Comment:     Standardized to IDMS reference method     AST   Date Value Ref Range Status   07/17/2022 17 13 - 39 U/L Final     Comment:     Specimen collection should occur prior to Sulfasalazine administration due to the potential for falsely depressed results  07/06/2022 16 13 - 39 U/L Final     Comment:     Specimen collection should occur prior to Sulfasalazine administration due to the potential for falsely depressed results  01/06/2022 17 10 - 30 U/L Final     ALT   Date Value Ref Range Status   07/17/2022 21 7 - 52 U/L Final     Comment:     Specimen collection should occur prior to Sulfasalazine administration due to the potential for falsely depressed results  07/06/2022 18 7 - 52 U/L Final     Comment:     Specimen collection should occur prior to Sulfasalazine administration due to the potential for falsely depressed results      01/06/2022 16 6 - 29 U/L Final          Starla Saldana MD  11/26/2022  8:44 AM

## 2022-11-27 VITALS
WEIGHT: 195 LBS | HEART RATE: 85 BPM | RESPIRATION RATE: 20 BRPM | HEIGHT: 63 IN | DIASTOLIC BLOOD PRESSURE: 58 MMHG | TEMPERATURE: 98.5 F | BODY MASS INDEX: 34.55 KG/M2 | SYSTOLIC BLOOD PRESSURE: 114 MMHG | OXYGEN SATURATION: 97 %

## 2022-11-27 RX ORDER — DOCUSATE SODIUM 100 MG/1
100 CAPSULE, LIQUID FILLED ORAL 2 TIMES DAILY
Qty: 30 CAPSULE | Refills: 0 | Status: SHIPPED | OUTPATIENT
Start: 2022-11-27 | End: 2022-12-05

## 2022-11-27 RX ORDER — ACETAMINOPHEN 325 MG/1
650 TABLET ORAL EVERY 6 HOURS SCHEDULED
Qty: 4 TABLET | Refills: 0 | Status: SHIPPED | OUTPATIENT
Start: 2022-11-27 | End: 2022-11-28

## 2022-11-27 RX ORDER — IBUPROFEN 600 MG/1
600 TABLET ORAL EVERY 6 HOURS SCHEDULED
Qty: 30 TABLET | Refills: 0 | Status: SHIPPED | OUTPATIENT
Start: 2022-11-27 | End: 2022-12-05

## 2022-11-27 RX ORDER — OXYCODONE HYDROCHLORIDE 5 MG/1
5 TABLET ORAL EVERY 4 HOURS PRN
Qty: 10 TABLET | Refills: 0 | Status: SHIPPED | OUTPATIENT
Start: 2022-11-27 | End: 2022-12-05

## 2022-11-27 RX ADMIN — IBUPROFEN 600 MG: 600 TABLET, FILM COATED ORAL at 02:36

## 2022-11-27 RX ADMIN — ENOXAPARIN SODIUM 40 MG: 40 INJECTION SUBCUTANEOUS at 08:34

## 2022-11-27 RX ADMIN — ACETAMINOPHEN 650 MG: 325 TABLET, FILM COATED ORAL at 00:09

## 2022-11-27 RX ADMIN — DOCUSATE SODIUM 100 MG: 100 CAPSULE, LIQUID FILLED ORAL at 08:34

## 2022-11-27 RX ADMIN — IBUPROFEN 600 MG: 600 TABLET, FILM COATED ORAL at 08:34

## 2022-11-27 RX ADMIN — ACETAMINOPHEN 650 MG: 325 TABLET, FILM COATED ORAL at 06:06

## 2022-11-27 NOTE — PLAN OF CARE
Problem: POSTPARTUM  Goal: Experiences normal postpartum course  Description: INTERVENTIONS:  - Monitor maternal vital signs  - Assess uterine involution and lochia  Outcome: Completed  Goal: Appropriate maternal -  bonding  Description: INTERVENTIONS:  - Identify family support  - Assess for appropriate maternal/infant bonding   -Encourage maternal/infant bonding opportunities  - Referral to  or  as needed  Outcome: Completed  Goal: Establishment of infant feeding pattern  Description: INTERVENTIONS:  - Assess breast/bottle feeding  - Refer to lactation as needed  Outcome: Completed  Goal: Incision(s), wounds(s) or drain site(s) healing without S/S of infection  Description: INTERVENTIONS  - Assess and document dressing, incision, wound bed, drain sites and surrounding tissue  - Provide patient and family education     Problem: PAIN - ADULT  Goal: Verbalizes/displays adequate comfort level or baseline comfort level  Description: Interventions:  - Encourage patient to monitor pain and request assistance  - Assess pain using appropriate pain scale  - Administer analgesics based on type and severity of pain and evaluate response  - Implement non-pharmacological measures as appropriate and evaluate response  - Consider cultural and social influences on pain and pain management  - Notify physician/advanced practitioner if interventions unsuccessful or patient reports new pain  Outcome: Completed     Problem: INFECTION - ADULT  Goal: Absence or prevention of progression during hospitalization  Description: INTERVENTIONS:  - Assess and monitor for signs and symptoms of infection  - Monitor lab/diagnostic results  - Monitor all insertion sites, i e  indwelling lines, tubes, and drains  - [place] appropriate cooling/warming therapies per order  - Administer medications as ordered  - Instruct and encourage patient and family to use good hand hygiene technique  - Identify and instruct in appropriate isolation precautions for identified infection/condition  Outcome: Completed  Goal: Absence of fever/infection during neutropenic period  Description: INTERVENTIONS:  - Monitor WBC    Outcome: Completed     Problem: SAFETY ADULT  Goal: Patient will remain free of falls  Description: INTERVENTIONS:  - Educate patient/family on patient safety including physical limitations  - Instruct patient to call for assistance with activity   - Consult OT/PT to assist with strengthening/mobility   - Keep Call bell within reach  - Keep bed low and locked with side rails adjusted as appropriate  - Keep care items and personal belongings within reach  - Initiate and maintain comfort rounds  -Outcome: Completed  Goal: Maintain or return to baseline ADL function  Description: INTERVENTIONS:  -  Assess patient's ability to carry out ADLs; assess patient's baseline for ADL function and identify physical deficits which impact ability to perform ADLs (bathing, care of mouth/teeth, toileting, grooming, dressing, etc )  - Assess/evaluate cause of self-care deficits   - Assess range of motion  - Assess patient's mobility; develop plan if impaired  - Assess patient's need for assistive devices and provide as appropriate  - Encourage maximum independence but intervene and supervise when necessary  - Involve family in performance of ADLs  - Assess for home care needs following discharge   - Consider OT consult to assist with ADL evaluation and planning for discharge  - Provide patient education as appropriate  Outcome: Completed  Goal: Maintains/Returns to pre admission functional level  Description: INTERVENTIONS:  - Perform BMAT or MOVE assessment daily    - Set and communicate daily mobility goal to care team and patient/family/caregiver     - Out of bed for toileting  - Record patient progress and toleration of activity level   Outcome: Completed     Problem: Knowledge Deficit  Goal: Patient/family/caregiver demonstrates understanding of disease process, treatment plan, medications, and discharge instructions  Description: Complete learning assessment and assess knowledge base    Interventions:  - Provide teaching at level of understanding  - Provide teaching via preferred learning methods  Outcome: Completed     Problem: DISCHARGE PLANNING  Goal: Discharge to home or other facility with appropriate resources  Description: INTERVENTIONS:  - Identify barriers to discharge w/patient and caregiver  - Arrange for needed discharge resources and transportation as appropriate  - Identify discharge learning needs (meds, wound care, etc )  - Arrange for interpretive services to assist at discharge as needed  - Refer to Case Management Department for coordinating discharge planning if the patient needs post-hospital services based on physician/advanced practitioner order or complex needs related to functional status, cognitive ability, or social support system  Outcome: Completed     Problem: ALTERATION IN THE BREAST  Goal: Optimize infant feeding at the breast  Description: INTERVENTIONS:  - Latch, breast and nipple assessment  - Assess prior breast feeding history  - Hand expression of breast milk  - For cracked, bleeding and or sore nipples reassess latch, treat damaged nipple  -Educate mother on feeding cues  -Positioning/latch techniques  Outcome: Completed     Problem: INADEQUATE LATCH, SUCK OR SWALLOW  Goal: Demonstrate ability to latch and sustain latch, audible swallowing and satiety  Description: INTERVENTIONS:  - Assess oral anatomy, notify Physician/AP for abnormal findings  - Establish milk expression  - Maximize feeding opportunity (skin to skin, behavioral state)  - Position/latch techniques  - Discourage use of pacifier-artificial nipple  - Mechanical pumping  - Nipple Shield  - Supplemental formula feeding (Physician/AP order)  - Alternative feeding method  Outcome: Completed

## 2022-11-27 NOTE — PLAN OF CARE
Problem: POSTPARTUM  Goal: Experiences normal postpartum course  Description: INTERVENTIONS:  - Monitor maternal vital signs  - Assess uterine involution and lochia  Outcome: Progressing  Goal: Appropriate maternal -  bonding  Description: INTERVENTIONS:  - Identify family support  - Assess for appropriate maternal/infant bonding   -Encourage maternal/infant bonding opportunities  - Referral to  or  as needed  Outcome: Progressing  Goal: Establishment of infant feeding pattern  Description: INTERVENTIONS:  - Assess breast/bottle feeding  - Refer to lactation as needed  Outcome: Progressing  Goal: Incision(s), wounds(s) or drain site(s) healing without S/S of infection  Description: INTERVENTIONS  - Assess and document dressing, incision, wound bed, drain sites and surrounding tissue  - Provide patient and family education  - Outcome: Progressing     Problem: PAIN - ADULT  Goal: Verbalizes/displays adequate comfort level or baseline comfort level  Description: Interventions:  - Encourage patient to monitor pain and request assistance  - Assess pain using appropriate pain scale  - Administer analgesics based on type and severity of pain and evaluate response  - Implement non-pharmacological measures as appropriate and evaluate response  - Consider cultural and social influences on pain and pain management  - Notify physician/advanced practitioner if interventions unsuccessful or patient reports new pain  Outcome: Progressing     Problem: INFECTION - ADULT  Goal: Absence or prevention of progression during hospitalization  Description: INTERVENTIONS:  - Assess and monitor for signs and symptoms of infection  - Monitor lab/diagnostic results  - Monitor all insertion sites, i e  indwelling lines, tubes, and drains  - Monitor endotracheal if appropriate and nasal secretions for changes in amount and color  - [place] appropriate cooling/warming therapies per order  - Administer medications as ordered  - Instruct and encourage patient and family to use good hand hygiene technique  - Identify and instruct in appropriate isolation precautions for identified infection/condition  Outcome: Progressing  Goal: Absence of fever/infection during neutropenic period  Description: INTERVENTIONS:  - Monitor WBC    Outcome: Progressing     Problem: SAFETY ADULT  Goal: Patient will remain free of falls  Description: INTERVENTIONS:  - Educate patient/family on patient safety including physical limitations  - Instruct patient to call for assistance with activity   - Consult OT/PT to assist with strengthening/mobility   - Keep Call bell within reach  - Keep bed low and locked with side rails adjusted as appropriate  - Keep care items and personal belongings within reach  - Initiate and maintain comfort rounds  - Make Fall Risk Sign visible to staff  - - Apply yellow socks and bracelet for high fall risk patients  - Consider moving patient to room near nurses station  Outcome: Progressing  Goal: Maintain or return to baseline ADL function  Description: INTERVENTIONS:  -  Assess patient's ability to carry out ADLs; assess patient's baseline for ADL function and identify physical deficits which impact ability to perform ADLs (bathing, care of mouth/teeth, toileting, grooming, dressing, etc )  - Assess/evaluate cause of self-care deficits   - Assess range of motion  - Assess patient's mobility; develop plan if impaired  - Assess patient's need for assistive devices and provide as appropriate  - Encourage maximum independence but intervene and supervise when necessary  - Involve family in performance of ADLs  - Assess for home care needs following discharge   - Consider OT consult to assist with ADL evaluation and planning for discharge  - Provide patient education as appropriate  Outcome: Progressing  Goal: Maintains/Returns to pre admission functional level  Description: INTERVENTIONS:  - Perform BMAT or MOVE assessment daily    - Set and communicate daily mobility goal to care team and patient/family/caregiver  - Collaborate with rehabilitation services on mobility goals if consulted  - - Out of bed for toileting  - Record patient progress and toleration of activity level   Outcome: Progressing     Problem: Knowledge Deficit  Goal: Patient/family/caregiver demonstrates understanding of disease process, treatment plan, medications, and discharge instructions  Description: Complete learning assessment and assess knowledge base    Interventions:  - Provide teaching at level of understanding  - Provide teaching via preferred learning methods  Outcome: Progressing     Problem: DISCHARGE PLANNING  Goal: Discharge to home or other facility with appropriate resources  Description: INTERVENTIONS:  - Identify barriers to discharge w/patient and caregiver  - Arrange for needed discharge resources and transportation as appropriate  - Identify discharge learning needs (meds, wound care, etc )  - Arrange for interpretive services to assist at discharge as needed  - Refer to Case Management Department for coordinating discharge planning if the patient needs post-hospital services based on physician/advanced practitioner order or complex needs related to functional status, cognitive ability, or social support system  Outcome: Progressing     Problem: ALTERATION IN THE BREAST  Goal: Optimize infant feeding at the breast  Description: INTERVENTIONS:  - Latch, breast and nipple assessment  - Assess prior breast feeding history  - Hand expression of breast milk  - For cracked, bleeding and or sore nipples reassess latch, treat damaged nipple  -Educate mother on feeding cues  -Positioning/latch techniques  Outcome: Progressing     Problem: INADEQUATE LATCH, SUCK OR SWALLOW  Goal: Demonstrate ability to latch and sustain latch, audible swallowing and satiety  Description: INTERVENTIONS:  - Assess oral anatomy, notify Physician/AP for abnormal findings  - Establish milk expression  - Maximize feeding opportunity (skin to skin, behavioral state)  - Position/latch techniques  - Discourage use of pacifier-artificial nipple  - Mechanical pumping  - Nipple Shield  - Supplemental formula feeding (Physician/AP order)  - Alternative feeding method  Outcome: Progressing

## 2022-11-27 NOTE — PROGRESS NOTES
Progress Note - OB/GYN  Cordelia Voss 28 y o  female MRN: [id number]  Unit/Bed#:  318-01 Encounter: [id number]    Assessment and Plan     Cordelia Voss is a patient of: [facility]  She is PPD# 3 s/p  repeat  section, low transverse incision  Recovering well and is stable       Pregnancy resulting from assisted reproductive technology  Assessment & Plan  IVF pregnancy ([place])    * S/P  section  Assessment & Plan  , Hgb 10  --> post op Hgb 8 6, Venofer ordered on POD#1  Simons removed on POD#1,s/p vt  Pain: Tylenol and toradol scheduled, jerman 5/10 PRN    FEN: Tolerating regular diet  DVT ppx: SCDs and Lovenox 40mg qD  Passing flatus   Incision C/D/I             Disposition    - Anticipate discharge home on PPD# 3      Subjective/Objective     Chief Complaint: Postpartum State     Subjective:    Cordelia Voss is POD#3 s/p  repeat  section, low transverse incision  She has no current complaints  Pain is well controlled  Patient is currently voiding  She is ambulating  Patient is currently passing flatus and has had bowel movement  She is tolerating PO, and denies nausea or vomitting  Patient denies fever, chills, chest pain, shortness of breath, or calf tenderness  Lochia is normal  She is  Breastfeeding  She is recovering well and is stable         Vitals:   /59 (BP Location: Left arm)   Pulse 94   Temp 98 7 °F (37 1 °C) (Oral)   Resp 20   Ht 5' 3" (1 6 m)   Wt 88 5 kg (195 lb)   LMP 2021 (Exact Date)   SpO2 97%   Breastfeeding Yes   BMI 34 54 kg/m²     No intake or output data in the 24 hours ending 22 0706    Invasive Devices     None                 Physical Exam:   GEN: Cordelia Voss appears well, alert and oriented x 3, pleasant and cooperative   CARDIO: RRR, no murmurs or rubs  RESP:  CTAB, no wheezes or rales  ABDOMEN: soft, no tenderness, no distention, fundus @ U-3, Incision C/D/I  EXTREMITIES: SCDs , non tender, no erythema      Labs:     Hemoglobin   Date Value Ref Range Status   11/25/2022 8 6 (L) 11 5 - 15 4 g/dL Final   11/24/2022 10 3 (L) 11 5 - 15 4 g/dL Final     WBC   Date Value Ref Range Status   11/25/2022 13 95 (H) 4 31 - 10 16 Thousand/uL Final   11/24/2022 11 16 (H) 4 31 - 10 16 Thousand/uL Final     Platelets   Date Value Ref Range Status   11/25/2022 158 149 - 390 Thousands/uL Final   11/24/2022 183 149 - 390 Thousands/uL Final     Creatinine   Date Value Ref Range Status   07/17/2022 0 41 (L) 0 60 - 1 30 mg/dL Final     Comment:     Standardized to IDMS reference method   07/06/2022 0 80 0 60 - 1 30 mg/dL Final     Comment:     Standardized to IDMS reference method     AST   Date Value Ref Range Status   07/17/2022 17 13 - 39 U/L Final     Comment:     Specimen collection should occur prior to Sulfasalazine administration due to the potential for falsely depressed results  07/06/2022 16 13 - 39 U/L Final     Comment:     Specimen collection should occur prior to Sulfasalazine administration due to the potential for falsely depressed results  01/06/2022 17 10 - 30 U/L Final     ALT   Date Value Ref Range Status   07/17/2022 21 7 - 52 U/L Final     Comment:     Specimen collection should occur prior to Sulfasalazine administration due to the potential for falsely depressed results  07/06/2022 18 7 - 52 U/L Final     Comment:     Specimen collection should occur prior to Sulfasalazine administration due to the potential for falsely depressed results      01/06/2022 16 6 - 29 U/L Final          Helen Bustos MD  11/27/2022  7:06 AM

## 2022-11-30 ENCOUNTER — HOSPITAL ENCOUNTER (EMERGENCY)
Facility: HOSPITAL | Age: 27
Discharge: HOME | End: 2022-11-30
Attending: EMERGENCY MEDICINE

## 2022-11-30 ENCOUNTER — APPOINTMENT (EMERGENCY)
Dept: RADIOLOGY | Facility: HOSPITAL | Age: 27
End: 2022-11-30

## 2022-11-30 VITALS
BODY MASS INDEX: 32.03 KG/M2 | OXYGEN SATURATION: 97 % | HEART RATE: 88 BPM | SYSTOLIC BLOOD PRESSURE: 134 MMHG | WEIGHT: 180.78 LBS | TEMPERATURE: 98.5 F | RESPIRATION RATE: 18 BRPM | DIASTOLIC BLOOD PRESSURE: 63 MMHG | HEIGHT: 63 IN

## 2022-11-30 DIAGNOSIS — I80.9 PHLEBITIS: Primary | ICD-10-CM

## 2022-11-30 LAB — PLACENTA IN STORAGE: NORMAL

## 2022-11-30 RX ORDER — ACETAMINOPHEN 325 MG/1
650 TABLET ORAL ONCE
Status: COMPLETED | OUTPATIENT
Start: 2022-11-30 | End: 2022-11-30

## 2022-11-30 RX ADMIN — ACETAMINOPHEN 650 MG: 325 TABLET ORAL at 10:37

## 2022-11-30 NOTE — ED PROVIDER NOTES
History  Chief Complaint   Patient presents with   • Hand Injury     Pt presents to the ED with c/o right hand injury/swelling  Pt recently d/c from L&D, had IV in right hand  Cannot bend, swollen, numbness in 2nd digit  Patient presents to the emergency room with complaints of swelling and pain over the dorsal aspect of her right hand  She states has been present since she had an IV placed in her hand on November 25th during labor  He complains of localized swelling  She denies any fever chills  She complains of pain with range of motion  History provided by:  Patient  Hand Injury  Location:  Hand  Hand location:  R hand  Injury: no    Pain details:     Quality:  Aching    Radiates to:  Does not radiate    Severity:  Moderate    Onset quality:  Gradual    Duration:  3 days    Timing:  Constant    Progression:  Waxing and waning  Handedness:  Right-handed  Dislocation: no    Prior injury to area:  No  Relieved by:  Nothing  Worsened by: Movement  Ineffective treatments:  None tried  Associated symptoms: stiffness and swelling    Associated symptoms: no decreased range of motion, no fatigue, no fever, no numbness and no tingling    Swelling:     Location:  Hand    Duration:  3 days    Timing:  Constant    Chronicity:  New  Risk factors: no concern for non-accidental trauma, no known bone disorder, no frequent fractures and no recent illness        Prior to Admission Medications   Prescriptions Last Dose Informant Patient Reported? Taking?    Prenatal Vit-Fe Fumarate-FA (PRENATAL VITAMIN PO)   Yes No   Sig: Take by mouth in the morning   docusate sodium (COLACE) 100 mg capsule   No No   Sig: Take 1 capsule (100 mg total) by mouth 2 (two) times a day   ibuprofen (MOTRIN) 600 mg tablet   No No   Sig: Take 1 tablet (600 mg total) by mouth every 6 (six) hours   oxyCODONE (Roxicodone) 5 immediate release tablet   No No   Sig: Take 1 tablet (5 mg total) by mouth every 4 (four) hours as needed for moderate pain for up to 10 days Max Daily Amount: 30 mg      Facility-Administered Medications: None       Past Medical History:   Diagnosis Date   • Varicella     had chicken pox       Past Surgical History:   Procedure Laterality Date   •  SECTION  2014    x1   • DE  DELIVERY ONLY N/A 2022    Procedure:  SECTION () REPEAT;  Surgeon: Ning Baca MD;  Location: [facility];  Service: Obstetrics   • TONSILLECTOMY         Family History   Problem Relation Age of Onset   • Thyroid disease Mother    • Diabetes type II Mother    • Asthma Father    • Thyroid disease Sister    • No Known Problems Sister    • No Known Problems Brother    • Breast cancer Maternal Grandmother    • Heart disease Maternal Grandmother    • Hypertension Maternal Grandmother    • Heart disease Maternal Grandfather    • Hypertension Maternal Grandfather    • Thyroid cancer Paternal Grandmother      I have reviewed and agree with the history as documented  E-Cigarette/Vaping   • E-Cigarette Use Never User      E-Cigarette/Vaping Substances   • Nicotine No    • THC No    • CBD No    • Flavoring No    • Other No    • Unknown No      Social History     Tobacco Use   • Smoking status: Never   • Smokeless tobacco: Never   Vaping Use   • Vaping Use: Never used   Substance Use Topics   • Alcohol use: No   • Drug use: No       Review of Systems   Constitutional: Positive for activity change  Negative for appetite change, chills, fatigue and fever  Musculoskeletal: Positive for arthralgias and stiffness  Skin: Negative for color change, pallor and wound  All other systems reviewed and are negative  Physical Exam  Physical Exam  Vitals and nursing note reviewed  Constitutional:       Appearance: Normal appearance  HENT:      Head: Normocephalic and atraumatic  Musculoskeletal:      Comments: Examination of the right hand-there is mild swelling upon inspection    There is tenderness over the dorsal aspect of the hand   There is good range of motion of the hand in all planes but patient has pain with terminal motion and extension  There are palpable pulses at the wrist +2 and symmetrical   Capillary refills less than 2 seconds   Skin:     General: Skin is warm  Capillary Refill: Capillary refill takes less than 2 seconds  Findings: No rash  Neurological:      Mental Status: She is alert and oriented to person, place, and time  Psychiatric:         Mood and Affect: Mood normal          Behavior: Behavior normal          Thought Content: Thought content normal          Judgment: Judgment normal          Vital Signs  ED Triage Vitals   Temperature Pulse Respirations Blood Pressure SpO2   11/30/22 1031 11/30/22 1031 11/30/22 1031 11/30/22 1031 11/30/22 1031   98 5 °F (36 9 °C) 88 18 134/63 97 %      Temp Source Heart Rate Source Patient Position - Orthostatic VS BP Location FiO2 (%)   11/30/22 1031 11/30/22 1031 11/30/22 1031 11/30/22 1031 --   Oral Monitor Sitting Right arm       Pain Score       11/30/22 1037       8           Vitals:    11/30/22 1031   BP: 134/63   Pulse: 88   Patient Position - Orthostatic VS: Sitting         Visual Acuity      ED Medications  Medications   acetaminophen (TYLENOL) tablet 650 mg (650 mg Oral Given 11/30/22 1037)       Diagnostic Studies  Results Reviewed     None                 XR hand 3+ views RIGHT   ED Interpretation by Seven Hollins PA-C (11/30 1143)   No fracture      Final Result by Jatinder Hernández MD (11/30 1229)      No acute osseous abnormality              Workstation performed: [id number]                    Procedures  Procedures         ED Course                                             MDM  Number of Diagnoses or Management Options  Phlebitis: new and does not require workup  Risk of Complications, Morbidity, and/or Mortality  Presenting problems: low  Diagnostic procedures: low  Management options: low    Patient Progress  Patient progress: stable      Disposition  Final diagnoses:   Phlebitis - Right hand     Time reflects when diagnosis was documented in both MDM as applicable and the Disposition within this note     Time User Action Codes Description Comment    11/30/2022 11:52 AM Tish Neal Add [I80 9] Phlebitis     11/30/2022 11:52 AM Tish Neal Modify [I80 9] Phlebitis Right hand      ED Disposition     ED Disposition   Discharge    Condition   Stable    Date/Time   Wed Nov 30, 2022 11:52 AM    Comment   Tc Adams Dkhissi discharge to home/self care  Follow-up Information    None         Discharge Medication List as of 11/30/2022 11:52 AM      CONTINUE these medications which have NOT CHANGED    Details   docusate sodium (COLACE) 100 mg capsule Take 1 capsule (100 mg total) by mouth 2 (two) times a day, Starting Sun 11/27/2022, Normal      ibuprofen (MOTRIN) 600 mg tablet Take 1 tablet (600 mg total) by mouth every 6 (six) hours, Starting Sun 11/27/2022, Normal      oxyCODONE (Roxicodone) 5 immediate release tablet Take 1 tablet (5 mg total) by mouth every 4 (four) hours as needed for moderate pain for up to 10 days Max Daily Amount: 30 mg, Starting Sun 11/27/2022, Until Wed 12/7/2022 at 2359, Normal      Prenatal Vit-Fe Fumarate-FA (PRENATAL VITAMIN PO) Take by mouth in the morning, Historical Med             No discharge procedures on file      PDMP Review     None          ED Provider  Electronically Signed by           Bolivar Ty PA-C  11/30/22 2017       Bolivar Ty PA-C  11/30/22 2019

## 2022-11-30 NOTE — ED NOTES
Pt requesting to speak to nursing supervisor regarding her treatment in OB  Supervisor made aware        Marcin Garza, RN  11/30/22 7742

## 2022-12-05 ENCOUNTER — POSTPARTUM VISIT (OUTPATIENT)
Dept: OBGYN CLINIC | Facility: CLINIC | Age: 27
End: 2022-12-05

## 2022-12-05 VITALS
HEIGHT: 63 IN | SYSTOLIC BLOOD PRESSURE: 114 MMHG | WEIGHT: 177.8 LBS | DIASTOLIC BLOOD PRESSURE: 80 MMHG | BODY MASS INDEX: 31.5 KG/M2

## 2022-12-05 NOTE — PROGRESS NOTES
Subjective     Nevin Clinton is a 28 y o  female who presents to the clinic 2 weeks status post Repeat C/s for term preg in labor  Eating a regular diet without difficulty  Bowel movements are normal  Pain is controlled without any medications  Light vaginal bleeding  Breast feeding    The following portions of the patient's history were reviewed and updated as appropriate: allergies, current medications, past family history, past medical history, past social history, past surgical history and problem list     Review of Systems  Pertinent items are noted in HPI  Objective     /80 (BP Location: Right arm, Patient Position: Sitting, Cuff Size: Standard)   Ht 5' 3" (1 6 m)   Wt 80 6 kg (177 lb 12 8 oz)   LMP 12/11/2021 (Exact Date)   Breastfeeding Yes   BMI 31 50 kg/m²   General:  alert and oriented, in no acute distress   Abdomen: soft, non-tender   Incision:   healing well, no drainage, no erythema, no hernia, no seroma, no swelling, no dehiscence, incision well approximated         Assessment      Doing well postoperatively  Operative findings again reviewed  Plan     1  Continue any current medications  2  Wound care discussed  3  Activity restrictions: no lifting more than 10 pounds  4  Anticipated return to work: 6 weeks    5  Follow up: 4 weeks for postpartum exam

## 2022-12-27 ENCOUNTER — OFFICE VISIT (OUTPATIENT)
Dept: POSTPARTUM | Facility: CLINIC | Age: 27
End: 2022-12-27

## 2022-12-27 NOTE — PATIENT INSTRUCTIONS
Pump whenever Murel Mohs is not fed at the breast     Use paced bottle feeding for supplementing Murel Mohs  See the hand out or search "paced bottle feeding milk mob" on the internet for a good video that demonstrates this technique

## 2022-12-27 NOTE — PROGRESS NOTES
INITIAL BREAST FEEDING EVALUATION    Informant/Relationship: Jeanette/mom and dad    Discussion of General Lactation Issues: Yuni Quiñones is mostly directly   Over the last week, breastfeeding got better, but nursing was problematic initially  Marley Baird had early trauma with cracking and bleeding  For a while she was pumping and feeding expressed breast milk  Marley Baird was first told by the [facility] in the hospital that Yuni Quiñones was tongue tied  This was confirmed by his pediatrician  Infant is 2 month old today          History:  Fertility Problem:yes - secondary infertility; endometriosis?, used IVF  Breast changes:yes - areola got darker, breasts got hard  : c/s; repeat  Full term:yes - 43   labor:no  First nursing/attempt < 1 hour after birth:yes - in recovery  Skin to skin following delivery:yes - in recovery  Breast changes after delivery:yes - day 2 or 3  was satisfied after sleeping  Rooming in (infant in room with mother with exception of procedures, eg  Circumcision: yes - except for circ  Blood sugar issues:no  NICU stay:no  Jaundice:no  Phototherapy:no  Supplement given: (list supplement and method used as well as reason(s):no    Past Medical History:   Diagnosis Date   • Varicella     had chicken pox         Current Outpatient Medications:   •  Prenatal Vit-Fe Fumarate-FA (PRENATAL VITAMIN PO), Take by mouth in the morning, Disp: , Rfl:     No Known Allergies    Social History     Substance and Sexual Activity   Drug Use No       Social History     Interval Breastfeeding History:    Frequency of breast feeding: every 3 hours  Does mother feel breastfeeding is effective: Yes  Does infant appear satisfied after nursing:Yes  Stooling pattern normal: lYes  Urinating frequently:Yes  Using shield or shells: No    Alternative/Artificial Feedings:   Bottle: Yes, once/day when mom goes out  Cup: No  Syringe/Finger: No           Formula Type: n/a                     Amount: n/a            Breast Milk:                      Amount: 6 oz            Frequency Q 3 Hr between feedings  Elimination Problems: No      Equipment:  Nipple Shield             Type: n/a             Size: n/a             Frequency of Use: n/a  Pump            Type: Spectra            Frequency of Use: once daily; about 6 oz total  Shells            Type: n/a            Frequency of use: n/a    Equipment Problems: no    Mom:  Breast: Normal  Nipple Assessment in General: Normal: elongated/eraser, no discoloration and no damage noted  Mother's Awareness of Feeding Cues                 Recognizes: Yes                  Verbalizes: Yes  Support System: FOB  History of Breastfeeding: Inclusively breast fed older child  Changes/Stressors/Violence: Pain with latch and told that he has tongue tie  Concerns/Goals: Tom Mcfarland wishes to exclusively breast milk feed, primarily at the breast    Problems with Mom: None    Physical Exam  Constitutional:       Appearance: Normal appearance  She is well-developed and normal weight  HENT:      Head: Normocephalic and atraumatic  Eyes:      Extraocular Movements: Extraocular movements intact  Neck:      Thyroid: No thyromegaly  Cardiovascular:      Rate and Rhythm: Normal rate and regular rhythm  Pulses: Normal pulses  Heart sounds: Normal heart sounds  No murmur heard  Pulmonary:      Effort: Pulmonary effort is normal       Breath sounds: Normal breath sounds  Musculoskeletal:      Cervical back: Normal range of motion and neck supple  Lymphadenopathy:      Cervical: No cervical adenopathy  Upper Body:      Right upper body: No pectoral adenopathy  Left upper body: No pectoral adenopathy  Neurological:      General: No focal deficit present  Mental Status: She is alert and oriented to person, place, and time  Psychiatric:         Mood and Affect: Mood normal          Behavior: Behavior normal          Thought Content:  Thought content normal          Judgment: Judgment normal    Vitals and nursing note reviewed  Infant:  Behaviors: Alert  Color: Healthy  Birth weight: 3 885 kg  Current weight: 4 59 kg    Problems with infant: Tongue tie      General Appearance:  Alert, active, no distress                             Head:  Normocephalic, AFOF, sutures opposed                             Eyes:  Conjunctiva clear, no drainage                              Ears:  Normally placed, no anomolies                             Nose:  Septum intact, no drainage or erythema                           Mouth:  No lesions; tongue extends just over the lower alveolar ridge, has very limited lateralization bilaterally with slight rotation in attempt,and has almost no lift; there is minimal cupping of the examiner's finger and no peristalsis; the tongue moves up and down in conjunction with the jaw; the frenulum is thin, non elastic,k and extends from 1 mm behind the top of the tongue to the top of the lower alveolar ridge and limits both oral gape and passive lift of the tongue                    Neck:  Supple, symmetrical, trachea midline, no adenopathy; thyroid: no enlargement, symmetric, no tenderness/mass/nodules                 Respiratory:  No grunting, flaring, retractions, breath sounds clear and equal            Cardiovascular:  Regular rate and rhythm  No murmur  Adequate perfusion/capillary refill   Femoral pulse present                    Abdomen:   Soft, non-tender, no masses, bowel sounds present, no HSM             Genitourinary:  Normal male, testes descended, no discharge, swelling, or pain, anus patent                          Spine:   No abnormalities noted        Musculoskeletal:  Full range of motion          Skin/Hair/Nails:   Skin warm, dry, and intact, no rashes or abnormal dyspigmentation or lesions                Neurologic:   No abnormal movement, tone appropriate for gestational age     Latch:  Efficiency:               Lips Flanged: Yes, after frenotomy Depth of latch: Excellent, after frenotomy              Audible Swallow: Yes, sustained SSB after frenotomy              Visible Milk: Yes, after frenotomy              Wide Open/ Asymmetrical: Yes, after frenotomy              Suck Swallow Cycle: Breathing: Unlabored, Coordinated: Yes  Nipple Assessment after latch: Normal: elongated/eraser, no discoloration and no damage noted  Latch Problems: After the frenotomy, Myranda Parrish latched easily, deeply, widely, asymmetrically, and with increased comfort  He quickly attained a SSS and nursed until content at both breasts, only briefly at the second  Position:  Infant's Ergonomics/Body               Body Alignment: Yes               Head Supported: Yes               Close to Mom's body/ Lifted/ Supported: Yes               Mom's Ergonomics/Body: Yes                           Supported: Yes                           Sitting Back: Yes                           Brings Baby to her breast: Yes  Positioning Problems: None      Handouts:   Paced bottle feeding    Education:  Reviewed Latch: Reviewed how to gently compress the breast as if offering a sandwich to facilitate a deeper latch  Reviewed Positioning for Dyad: Reviewed how to bring baby to the breast so that his lower lip and chin touch the breast with his nose just above the nipple to encourage a wider, more asymmetric latch  Reviewed Frequency/Supply & Demand: Recommended feeding on demand: when the baby gives hunger cues, when the breasts feel full, every 3 hours during the day and every 5 hours at night counting from the beginning of one feeding to the beginning of the next; whichever comes first    Reviewed Alternative/Artificial Feedings: Paced bottle feeding        Plan:  Discussed history and physical exams with Tyrell Pineda  Support given for her commitment to providing breast milk for her baby   Discussed the findings on the baby's exam consistent with tongue tie and reviewed how this may be the cause of nipple trauma, nipple pain, nipple damage, poor milk transfer, blocked ducts, mastitis, and loss of milk production  Discussed the science that supports performing the frenotomy to improve latch  I have spent 40 minutes with Patient and family today in which greater than 50% of this time was spent in counseling/coordination of care regarding Prognosis, Risks and benefits of tx options, Intructions for management, Patient and family education and Impressions

## 2023-01-06 ENCOUNTER — OFFICE VISIT (OUTPATIENT)
Dept: POSTPARTUM | Facility: CLINIC | Age: 28
End: 2023-01-06

## 2023-01-06 DIAGNOSIS — Z71.89 ENCOUNTER FOR BREAST FEEDING COUNSELING: Primary | ICD-10-CM

## 2023-01-06 NOTE — PATIENT INSTRUCTIONS
Nurse on demand  When sucking and swallowing slow, gently compress the breast to restart flow  If active suck-swallow does not restart, gently remove the baby and offer the other breast; offering up to "four" breasts per feeding   Pay close attention to positioning for a deeper latch  Attaching Your Baby at the [employer] is a great resource for practicing effective positioning an determining if your baby is latching and feeding effectively  Feed expressed milk or formula as needed/desired  Paced bottle feeding technique is less stressful for your baby, prevents overfeeding and protects the breastfeeding relationship  You can find a video about paced bottle feeding at www [employer]  org  Pump when a feeding at the breast is missed and as needed to obtain milk for bottle feeding  Referrals for physical therapy and speech therapy have been sent for [facility] for more support with feeding  Please call with any questions or concerns

## 2023-01-06 NOTE — PROGRESS NOTES
BREAST FEEDING FOLLOW UP VISIT    Informant/Relationship: Kimberlee Alvarez and her mom    Discussion of General Lactation Issues: Kimberlee Alvarez is concerned that Signe Libman is spitting up a lot recently  He has vomited 4 times since last night  He otherwise appears to be acting normally  Infant is 7 weeks old today  Interval Breastfeeding History:    Frequency of breast feeding: Every 3 hours ATC  Does mother feel breastfeeding is effective: Yes  Does infant appear satisfied after nursing:Yes  Stooling pattern normal:Yes  Urinating frequently:Yes  Using shield or shells:No    Alternative/Artificial Feedings:   Bottle: Yes, sometimes  Cup: No  Syringe/Finger: No           Formula Type: none                     Amount: n/a            Breast Milk:                      Amount: 2 ounces            Frequency Q 3 Hr between feedings  Elimination Problems: No      Equipment:  Nipple Shield             Type: none             Size: n/a             Frequency of Use: n/a  Pump            Type: Spectra S2            Frequency of Use: occasionally  Shells            Type: none            Frequency of use: n/a    Equipment Problems: no      Mom:  Breast: Medium sized symmetrical breasts  Slightly conical shape  Closely spaced  Nipple Assessment in General: Normal: elongated/eraser, no discoloration and no damage noted  Mother's Awareness of Feeding Cues                 Recognizes: Yes                  Verbalizes: Yes  Support System: FOB, extended family  History of Breastfeeding: Inclusively fed her older child  Changes/Stressors/Violence: Kimberlee Alvarez is concerned about Brian's spitting up/vomiting  Concerns/Goals: Kimberlee Alvarez desires to exclusively breastmilk feed    Problems with Mom: none    Physical Exam  Constitutional:       Appearance: Normal appearance  HENT:      Head: Normocephalic and atraumatic  Pulmonary:      Effort: Pulmonary effort is normal    Musculoskeletal:         General: Normal range of motion        Cervical back: Normal range of motion and neck supple  Neurological:      Mental Status: She is alert and oriented to person, place, and time  Skin:     General: Skin is warm and dry  Psychiatric:         Mood and Affect: Mood normal          Behavior: Behavior normal          Thought Content: Thought content normal          Judgment: Judgment normal          Infant:  Behaviors: Alert  Color: Pink  Birth weight: 3885gram  Current weight: 4785gram    Problems with infant: tongue tie s/p frenotomy, spitting up a lot      General Appearance:  Alert, active, no distress                             Head:  Normocephalic, AFOF, sutures opposed                             Eyes:  Conjunctiva clear, no drainage                              Ears:  Normally placed, no anomolies                             Nose:  no drainage or erythema                           Mouth:  No lesions  Tongue extends only to the lower alveolar ridge  Lateralizes well  Lift is unrestricted  Good cupping of my finger noted with some effective peristalsis of the entire tongue while sucking but frequent compression also noted  Frenotomy wound has healed completely  Neck:  Supple, symmetrical, trachea midline                 Respiratory:  No grunting, flaring, retractions, breath sounds clear and equal            Cardiovascular:  Regular rate and rhythm  No murmur  Adequate perfusion/capillary refill  Abdomen:   Soft, non-tender, no masses, bowel sounds present, no HSM             Genitourinary:  Normal male, testes descended, no discharge, swelling, or pain, anus patent                          Spine:   No abnormalities noted        Musculoskeletal:  Full range of motion          Skin/Hair/Nails:   Skin warm, dry, and intact, no rashes or abnormal dyspigmentation or lesions                Neurologic:   No abnormal movement, increased tone  Keeps his arms flexed and fists clenched  Hips and legs are flexed as well    Difficult to position comfortably for feeding     Latch:  Efficiency:               Lips Flanged: upper lip curls under, lower lip flanges              Depth of latch: good              Audible Swallow: Yes, sustained suckling bursts noted for the first few minutes  Azalia Enriquez began to pull on the nipple when milk flow slowed              Visible Milk: Yes              Wide Open/ Asymmetrical: asymmetrical but could be deeper              Suck Swallow Cycle: Breathing: unlabored, Coordinated: yes  Nipple Assessment after latch: Normal: elongated/eraser, no discoloration and no damage noted  Latch Problems: Brian's upper lip curls under tightly on the breast   He pops off the breast and pulls on the nipple frequently as he feeds  Position:  Infant's Ergonomics/Body               Body Alignment: Yes               Head Supported: Yes               Close to Mom's body/ Lifted/ Supported: Yes               Mom's Ergonomics/Body: No                           Supported: Yes                           Sitting Back: Yes, after latch                           Brings Baby to her breast: No  Positioning Problems: Bette twists her torso and leans over Azalia Enriquez for latch  She pushes her nipple down into his mouth rather than moving him onto the breast   Due to Brian's increased tone, he was challenging to position at the breast       Handouts:   None    Education:  Reviewed Latch: Demonstrated how to gently compress the breast and align the baby so that his nose is just above the nipple with his lower lip and chin touching the breast to encourage the deepest, widest, off-center latch  Reviewed Positioning for Dyad: Demonstrated how to position mom comfortably and supported with her baby belly to belly prior to bringing him onto the breast         Plan:   Reassurance and support given  I encouraged Caio Alan to continue to feed on demand    We worked on positioning to improve her comfort and confidence and Brian's ability to latch and feed effectively  I suggestion gentle breast compressions to increase milk flow as needed  She was given instructions for pumping and paced bottle feeding as she returns to work soon  Referrals for PT and ST evaluations were sent for [facility]  I encouraged Job Harm to call with any questions or concerns  I have spent 45 minutes with Patient and family today in which greater than 50% of this time was spent in counseling/coordination of care regarding Patient and family education

## 2023-01-07 NOTE — PROGRESS NOTES
I have reviewed the notes, assessments, and/or procedures performed by Jolyn Goltz, RN, IBCLC, I concur with her/his documentation of Jono Nelson MD 01/07/23

## 2023-02-25 ENCOUNTER — HOSPITAL ENCOUNTER (EMERGENCY)
Facility: HOSPITAL | Age: 28
Discharge: HOME | End: 2023-02-26
Attending: EMERGENCY MEDICINE

## 2023-02-25 DIAGNOSIS — S93.409A ANKLE SPRAIN: Primary | ICD-10-CM

## 2023-02-25 NOTE — Clinical Note
Judith Lomas was seen and treated in our emergency department on 2/25/2023  Diagnosis: ankle sprain    Bette    She may return on this date: 02/28/2023         If you have any questions or concerns, please don't hesitate to call        Angelica Shelton MD    ______________________________           _______________          _______________  Hospital Representative                              Date                                Time

## 2023-02-26 ENCOUNTER — APPOINTMENT (EMERGENCY)
Dept: RADIOLOGY | Facility: HOSPITAL | Age: 28
End: 2023-02-26

## 2023-02-26 VITALS
DIASTOLIC BLOOD PRESSURE: 66 MMHG | HEART RATE: 74 BPM | OXYGEN SATURATION: 98 % | RESPIRATION RATE: 16 BRPM | TEMPERATURE: 97.6 F | SYSTOLIC BLOOD PRESSURE: 108 MMHG

## 2023-02-26 RX ORDER — ACETAMINOPHEN 325 MG/1
650 TABLET ORAL ONCE
Status: COMPLETED | OUTPATIENT
Start: 2023-02-26 | End: 2023-02-26

## 2023-02-26 RX ORDER — IBUPROFEN 600 MG/1
600 TABLET ORAL ONCE
Status: COMPLETED | OUTPATIENT
Start: 2023-02-26 | End: 2023-02-26

## 2023-02-26 RX ADMIN — ACETAMINOPHEN 650 MG: 325 TABLET, FILM COATED ORAL at 01:24

## 2023-02-26 RX ADMIN — IBUPROFEN 600 MG: 600 TABLET, FILM COATED ORAL at 01:24

## 2023-02-28 ENCOUNTER — OFFICE VISIT (OUTPATIENT)
Dept: FAMILY MEDICINE CLINIC | Facility: CLINIC | Age: 28
End: 2023-02-28

## 2023-02-28 VITALS
HEIGHT: 63 IN | HEART RATE: 83 BPM | DIASTOLIC BLOOD PRESSURE: 70 MMHG | SYSTOLIC BLOOD PRESSURE: 100 MMHG | TEMPERATURE: 97.7 F | OXYGEN SATURATION: 97 % | BODY MASS INDEX: 31.36 KG/M2 | RESPIRATION RATE: 16 BRPM | WEIGHT: 177 LBS

## 2023-02-28 DIAGNOSIS — M25.571 ACUTE RIGHT ANKLE PAIN: Primary | ICD-10-CM

## 2023-02-28 NOTE — LETTER
February 28, 2023     Patient: Jhonathan Mcdaniel  YOB: 1995  Date of Visit: 2/28/2023      To Whom it May Concern:    Jhonathan Mcdaniel is under my professional care  Mohamuddanial Kimbrough was seen in my office on 2/28/2023  Mohamud Kimbrough may return to work on 3/3/23  If you have any questions or concerns, please don't hesitate to call           Sincerely,          Zandra Dawn MD

## 2023-02-28 NOTE — PROGRESS NOTES
Name: Axel Pineda      : 1995      MRN: [id number]  Encounter Provider: Shar Anaya MD  Encounter Date: 2023   Encounter department: [address] [place] [address]     1  Acute right ankle pain  -     Ambulatory Referral to Orthopedic Surgery; Future  Recommend ibuprofen  Continue resting, icing, elevation, ACE wrap  I did review xrays that were done in the ER  She may benefit from MRI if symptoms persist  For now she will schedule appointment with orthopedics  Subjective      HPI   Christine Astudillo presents today for follow up of right ankle injury which occurred on 23 while roller skating with her son  Went to the ER on 23 and had xrays of ankle and tib/fib done with no fractures  Was diagnosed as a sprain  Has been taking tylenol, using ACE wrap, icing, elevating, and resting with no relief  There continues to be 10/10 pain, sharp shooting, significant swelling  The pain shoots up her right leg  Tried using crutches, but fell because she does not know how to use them  There is pain when she puts weight on the right foot  Has full ROM of right foot  No numbness/tingling  Review of Systems   Constitutional: Negative  HENT: Negative  Eyes: Negative  Respiratory: Negative  Cardiovascular: Negative  Gastrointestinal: Negative  Endocrine: Negative  Genitourinary: Negative  Musculoskeletal: Negative  Right ankle pain and swelling   Skin: Negative  Allergic/Immunologic: Negative  Neurological: Negative  Hematological: Negative  Psychiatric/Behavioral: Negative          Current Outpatient Medications on File Prior to Visit   Medication Sig   • Prenatal Vit-Fe Fumarate-FA (PRENATAL VITAMIN PO) Take by mouth in the morning       Objective     /70 (BP Location: Right arm, Patient Position: Sitting, Cuff Size: Standard)   Pulse 83   Temp 97 7 °F (36 5 °C) (Temporal)   Resp 16   Ht 5' 3" (1 6 m)   Wt 80 3 kg (177 lb) SpO2 97%   BMI 31 35 kg/m²     Physical Exam  Constitutional:       General: She is not in acute distress  Appearance: She is well-developed  She is not diaphoretic  HENT:      Head: Normocephalic and atraumatic  Eyes:      General: No scleral icterus  Right eye: No discharge  Left eye: No discharge  Conjunctiva/sclera: Conjunctivae normal    Pulmonary:      Effort: Pulmonary effort is normal    Musculoskeletal:      Cervical back: Normal range of motion  Right ankle: Swelling present  No deformity, ecchymosis or lacerations  Tenderness present over the lateral malleolus  Normal range of motion  Right Achilles Tendon: Normal       Left ankle: Normal    Skin:     General: Skin is warm  Neurological:      Mental Status: She is alert and oriented to person, place, and time  Psychiatric:         Behavior: Behavior normal          Thought Content:  Thought content normal          Judgment: Judgment normal        Jared Mendez MD

## 2023-03-01 ENCOUNTER — OFFICE VISIT (OUTPATIENT)
Dept: OBGYN CLINIC | Facility: CLINIC | Age: 28
End: 2023-03-01

## 2023-03-01 VITALS
DIASTOLIC BLOOD PRESSURE: 78 MMHG | HEIGHT: 63 IN | SYSTOLIC BLOOD PRESSURE: 109 MMHG | OXYGEN SATURATION: 98 % | HEART RATE: 79 BPM | BODY MASS INDEX: 31.35 KG/M2

## 2023-03-01 DIAGNOSIS — M25.571 ACUTE RIGHT ANKLE PAIN: ICD-10-CM

## 2023-03-01 DIAGNOSIS — R26.89 UNABLE TO BEAR WEIGHT: ICD-10-CM

## 2023-03-01 DIAGNOSIS — S93.409A SPRAIN OF ANKLE, INITIAL ENCOUNTER: Primary | ICD-10-CM

## 2023-03-01 NOTE — PROGRESS NOTES
Assessment/Plan   Diagnoses and all orders for this visit:    Sprain of ankle, initial encounter    Acute right ankle pain    Unable to bear weight  - Suspected occ talus fracture  - CAM Boot  - NWB on crutches  - Ice, elevation  - MRI  - Follow up with Dr Cristina Wahl in a week          Subjective   Patient ID: Sherie Wood is a 28 y o  female  Vitals:    23 1818   BP: 109/78   Pulse: 79   SpO2: 98%     28yo female comes in for an evaluation of her right ankle  She was injured on 23 when she fell while rollerskating  She landed with her body landing on her plantarflexed foot  She felt a crack and c/o severe ankle pain  Xrays done in the ED were negative for fracture  She is completely unable to bear weight and the pain is deep inside the ankle  The pain has not improved at all in the last week  She is also unable to balance on the crutches and requests a wheelchair instead        The following portions of the patient's history were reviewed and updated as appropriate: allergies, current medications, past family history, past medical history, past social history, past surgical history and problem list     Review of Systems  Ortho Exam  Past Medical History:   Diagnosis Date   • Varicella     had chicken pox     Past Surgical History:   Procedure Laterality Date   •  SECTION  2014    x1   • [place]  DELIVERY ONLY N/A 2022    Procedure:  SECTION () REPEAT;  Surgeon: Joe Brandon MD;  Location: [facility];  Service: Obstetrics   • TONSILLECTOMY       Family History   Problem Relation Age of Onset   • Thyroid disease Mother    • Diabetes type II Mother    • Asthma Father    • Thyroid disease Sister    • No Known Problems Sister    • No Known Problems Brother    • Breast cancer Maternal Grandmother    • Heart disease Maternal Grandmother    • Hypertension Maternal Grandmother    • Heart disease Maternal Grandfather    • Hypertension Maternal Grandfather    • Thyroid cancer Paternal Grandmother      Social History     Occupational History   • Not on file   Tobacco Use   • Smoking status: Never   • Smokeless tobacco: Never   Vaping Use   • Vaping Use: Never used   Substance and Sexual Activity   • Alcohol use: No   • Drug use: No   • Sexual activity: Yes     Partners: Male       Review of Systems   Constitutional: Negative  HENT: Negative  Eyes: Negative  Respiratory: Negative  Cardiovascular: Negative  Gastrointestinal: Negative  Endocrine: Negative  Genitourinary: Negative  Musculoskeletal: As below      Allergic/Immunologic: Negative  Neurological: Negative  Hematological: Negative  Psychiatric/Behavioral: Negative  Objective   Physical Exam        I have personally reviewed pertinent films in PACS and my interpretation is no acute displaced fracture        · Constitutional: Awake, Alert, Oriented  · Eyes: EOMI  · Psych: Mood and affect appropriate  · Heart: regular rate   · Lungs: No audible wheezing  · Abdomen: No guarding  · Lymph: no lymphedema            • right ankle:  - Appearance  • Swelling: moderate and ecchymosis: moderate medially and laterally  - Palpation  • + tenderness across the anterior ankle and talus (she describes her pain has being deep inside in location)  • No tenderness about the medial / lateral malleoli, deltoid, proximal fibula, atf, cf, ptf, talus, achilles or 5th MT  - ROM  • Limited in all planes  - Special Tests  • Negative anterior drawer  - Motor  • limited by pain  - NVI distally

## 2023-03-09 NOTE — ED PROVIDER NOTES
History  Chief Complaint   Patient presents with   • Leg Injury     Pt presents to the ED with c/o R leg pain after she fell roller skating     Healthy 68-year-old female presenting with acute onset right ankle and calf pain since falling while rollerskating  She has been unable to bear weight on this foot since falling due to the pain  No medications taken prior to arrival           Prior to Admission Medications   Prescriptions Last Dose Informant Patient Reported? Taking? Prenatal Vit-Fe Fumarate-FA (PRENATAL VITAMIN PO)   Yes No   Sig: Take by mouth in the morning      Facility-Administered Medications: None       Past Medical History:   Diagnosis Date   • Varicella     had chicken pox       Past Surgical History:   Procedure Laterality Date   •  SECTION  2014    x1   • [place]  DELIVERY ONLY N/A 2022    Procedure:  SECTION () REPEAT;  Surgeon: Damien Flores MD;  Location: [facility] ;  Service: Obstetrics   • TONSILLECTOMY         Family History   Problem Relation Age of Onset   • Thyroid disease Mother    • Diabetes type II Mother    • Asthma Father    • Thyroid disease Sister    • No Known Problems Sister    • No Known Problems Brother    • Breast cancer Maternal Grandmother    • Heart disease Maternal Grandmother    • Hypertension Maternal Grandmother    • Heart disease Maternal Grandfather    • Hypertension Maternal Grandfather    • Thyroid cancer Paternal Grandmother      I have reviewed and agree with the history as documented      E-Cigarette/Vaping   • E-Cigarette Use Never User      E-Cigarette/Vaping Substances   • Nicotine No    • THC No    • CBD No    • Flavoring No    • Other No    • Unknown No      Social History     Tobacco Use   • Smoking status: Never   • Smokeless tobacco: Never   Vaping Use   • Vaping Use: Never used   Substance Use Topics   • Alcohol use: No   • Drug use: No       Review of Systems   Musculoskeletal: Positive for arthralgias, gait problem, joint swelling and myalgias  Physical Exam  Physical Exam  Constitutional:       General: She is not in acute distress  Musculoskeletal:      Comments: Mild right ankle swelling with lateral tenderness as well as pain with calf squeeze, normal pedal pulses, no erythema or ecchymosis, ankle range of motion limited secondary to pain but no deformity   Skin:     Capillary Refill: Capillary refill takes less than 2 seconds  Neurological:      General: No focal deficit present  Mental Status: She is alert  Vital Signs  ED Triage Vitals [02/25/23 2328]   Temperature Pulse Respirations Blood Pressure SpO2   97 6 °F (36 4 °C) 74 16 108/66 98 %      Temp Source Heart Rate Source Patient Position - Orthostatic VS BP Location FiO2 (%)   Oral Monitor Sitting Right arm --      Pain Score       10 - Worst Possible Pain           Vitals:    02/25/23 2328   BP: 108/66   Pulse: 74   Patient Position - Orthostatic VS: Sitting         Visual Acuity      ED Medications  Medications   acetaminophen (TYLENOL) tablet 650 mg (650 mg Oral Given 2/26/23 0124)   ibuprofen (MOTRIN) tablet 600 mg (600 mg Oral Given 2/26/23 0124)       Diagnostic Studies  Results Reviewed     None                 XR ankle 3+ views RIGHT   Final Result by Ashkan Atkinson MD (02/26 1045)      No acute osseous abnormality  Workstation performed: [id number]         XR tibia fibula 2 views RIGHT   Final Result by Ashkan Atkinson MD (02/26 1045)      No acute osseous abnormality  Workstation performed: [id number]                    Procedures  Procedures         ED Course       Healthy 79-year-old female presenting with right ankle pain after a fall  Normal neurovascular status on exam   X-rays show no fracture or acute bony abnormality and no widening of the ankle mortise  In light of normal x-ray suspect a ankle sprain which is consistent with the location of ankle tenderness and swelling    Will provide patient with crutches and a boot for comfort  Advised patient to follow-up with PCP and return to weightbearing as tolerated  MDM    Disposition  Final diagnoses: Ankle sprain     Time reflects when diagnosis was documented in both MDM as applicable and the Disposition within this note     Time User Action Codes Description Comment    2/26/2023  1:54 AM Kadi Shultz Add [[id number]] Ankle sprain       ED Disposition     ED Disposition   Discharge    Condition   Stable    Date/Time   Sun Feb 26, 2023  1:54 AM    Comment   Kurt Harris Dkhissi discharge to home/self care  Follow-up Information     Follow up With Specialties Details Why Contact Info    Zoraida Tay, DO Family Medicine  As needed [address] [facility]  [phone]            Discharge Medication List as of 2/26/2023  1:54 AM      CONTINUE these medications which have NOT CHANGED    Details   Prenatal Vit-Fe Fumarate-FA (PRENATAL VITAMIN PO) Take by mouth in the morning, Historical Med             No discharge procedures on file      PDMP Review     None          ED Provider  Electronically Signed by           Karmen Hamlin MD  03/08/23 2181
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
## 2023-07-01 ENCOUNTER — APPOINTMENT (EMERGENCY)
Dept: RADIOLOGY | Facility: HOSPITAL | Age: 28
End: 2023-07-01
Payer: COMMERCIAL

## 2023-07-01 ENCOUNTER — HOSPITAL ENCOUNTER (EMERGENCY)
Facility: HOSPITAL | Age: 28
Discharge: HOME | End: 2023-07-01
Attending: EMERGENCY MEDICINE
Payer: COMMERCIAL

## 2023-07-01 VITALS
HEART RATE: 80 BPM | OXYGEN SATURATION: 98 % | DIASTOLIC BLOOD PRESSURE: 71 MMHG | RESPIRATION RATE: 16 BRPM | TEMPERATURE: 98.5 F | SYSTOLIC BLOOD PRESSURE: 113 MMHG

## 2023-07-01 DIAGNOSIS — K59.00 CONSTIPATION: ICD-10-CM

## 2023-07-01 DIAGNOSIS — R10.9 ABDOMINAL PAIN: Primary | ICD-10-CM

## 2023-07-01 LAB
ALBUMIN SERPL BCP-MCNC: 4.1 G/DL (ref 3.5–5)
ALP SERPL-CCNC: 83 U/L (ref 34–104)
ALT SERPL W P-5'-P-CCNC: 13 U/L (ref 7–52)
ANION GAP SERPL CALCULATED.3IONS-SCNC: 8 MMOL/L
AST SERPL W P-5'-P-CCNC: 17 U/L (ref 13–39)
BASOPHILS # BLD AUTO: 0.03 THOUSANDS/ÂΜL (ref 0–0.1)
BASOPHILS NFR BLD AUTO: 0 % (ref 0–1)
BILIRUB DIRECT SERPL-MCNC: 0.05 MG/DL (ref 0–0.2)
BILIRUB SERPL-MCNC: 0.17 MG/DL (ref 0.2–1)
BILIRUB UR QL STRIP: NEGATIVE
BUN SERPL-MCNC: 12 MG/DL (ref 5–25)
CALCIUM SERPL-MCNC: 8.9 MG/DL (ref 8.4–10.2)
CHLORIDE SERPL-SCNC: 105 MMOL/L (ref 96–108)
CLARITY UR: CLEAR
CO2 SERPL-SCNC: 26 MMOL/L (ref 21–32)
COLOR UR: YELLOW
CREAT SERPL-MCNC: 0.73 MG/DL (ref 0.6–1.3)
EOSINOPHIL # BLD AUTO: 0.16 THOUSAND/ÂΜL (ref 0–0.61)
EOSINOPHIL NFR BLD AUTO: 2 % (ref 0–6)
ERYTHROCYTE [DISTWIDTH] IN BLOOD BY AUTOMATED COUNT: 12.7 % (ref 11.6–15.1)
EXT PREGNANCY TEST URINE: NEGATIVE
EXT. CONTROL: NORMAL
GFR SERPL CREATININE-BSD FRML MDRD: 111 ML/MIN/1.73SQ M
GLUCOSE SERPL-MCNC: 95 MG/DL (ref 65–140)
GLUCOSE UR STRIP-MCNC: NEGATIVE MG/DL
HCT VFR BLD AUTO: 37.8 % (ref 34.8–46.1)
HGB BLD-MCNC: 13.1 G/DL (ref 11.5–15.4)
HGB UR QL STRIP.AUTO: NEGATIVE
IMM GRANULOCYTES # BLD AUTO: 0.01 THOUSAND/UL (ref 0–0.2)
IMM GRANULOCYTES NFR BLD AUTO: 0 % (ref 0–2)
KETONES UR STRIP-MCNC: NEGATIVE MG/DL
LEUKOCYTE ESTERASE UR QL STRIP: NEGATIVE
LYMPHOCYTES # BLD AUTO: 2.91 THOUSANDS/ÂΜL (ref 0.6–4.47)
LYMPHOCYTES NFR BLD AUTO: 37 % (ref 14–44)
MCH RBC QN AUTO: 29.8 PG (ref 26.8–34.3)
MCHC RBC AUTO-ENTMCNC: 34.7 G/DL (ref 31.4–37.4)
MCV RBC AUTO: 86 FL (ref 82–98)
MONOCYTES # BLD AUTO: 0.5 THOUSAND/ÂΜL (ref 0.17–1.22)
MONOCYTES NFR BLD AUTO: 6 % (ref 4–12)
NEUTROPHILS # BLD AUTO: 4.18 THOUSANDS/ÂΜL (ref 1.85–7.62)
NEUTS SEG NFR BLD AUTO: 55 % (ref 43–75)
NITRITE UR QL STRIP: NEGATIVE
NRBC BLD AUTO-RTO: 0 /100 WBCS
PH UR STRIP.AUTO: 6 [PH]
PLATELET # BLD AUTO: 278 THOUSANDS/UL (ref 149–390)
PMV BLD AUTO: 10.4 FL (ref 8.9–12.7)
POTASSIUM SERPL-SCNC: 3.5 MMOL/L (ref 3.5–5.3)
PROT SERPL-MCNC: 7.1 G/DL (ref 6.4–8.4)
PROT UR STRIP-MCNC: NEGATIVE MG/DL
RBC # BLD AUTO: 4.4 MILLION/UL (ref 3.81–5.12)
SODIUM SERPL-SCNC: 139 MMOL/L (ref 135–147)
SP GR UR STRIP.AUTO: >=1.03 (ref 1–1.03)
UROBILINOGEN UR QL STRIP.AUTO: 0.2 E.U./DL
WBC # BLD AUTO: 7.79 THOUSAND/UL (ref 4.31–10.16)

## 2023-07-01 PROCEDURE — 81025 URINE PREGNANCY TEST: CPT | Performed by: EMERGENCY MEDICINE

## 2023-07-01 PROCEDURE — 80076 HEPATIC FUNCTION PANEL: CPT | Performed by: EMERGENCY MEDICINE

## 2023-07-01 PROCEDURE — 85025 COMPLETE CBC W/AUTO DIFF WBC: CPT | Performed by: EMERGENCY MEDICINE

## 2023-07-01 PROCEDURE — 96374 THER/PROPH/DIAG INJ IV PUSH: CPT

## 2023-07-01 PROCEDURE — 74018 RADEX ABDOMEN 1 VIEW: CPT

## 2023-07-01 PROCEDURE — 99284 EMERGENCY DEPT VISIT MOD MDM: CPT

## 2023-07-01 PROCEDURE — 81003 URINALYSIS AUTO W/O SCOPE: CPT | Performed by: EMERGENCY MEDICINE

## 2023-07-01 PROCEDURE — 36415 COLL VENOUS BLD VENIPUNCTURE: CPT | Performed by: EMERGENCY MEDICINE

## 2023-07-01 PROCEDURE — 80048 BASIC METABOLIC PNL TOTAL CA: CPT | Performed by: EMERGENCY MEDICINE

## 2023-07-01 RX ORDER — KETOROLAC TROMETHAMINE 30 MG/ML
15 INJECTION, SOLUTION INTRAMUSCULAR; INTRAVENOUS ONCE
Status: COMPLETED | OUTPATIENT
Start: 2023-07-01 | End: 2023-07-01

## 2023-07-01 RX ORDER — POLYETHYLENE GLYCOL 3350 17 G/17G
17 POWDER, FOR SOLUTION ORAL DAILY
Qty: 510 G | Refills: 0 | Status: SHIPPED | OUTPATIENT
Start: 2023-07-01

## 2023-07-01 RX ADMIN — KETOROLAC TROMETHAMINE 15 MG: 30 INJECTION, SOLUTION INTRAMUSCULAR at 22:53

## 2023-07-02 NOTE — ED PROVIDER NOTES
History  Chief Complaint   Patient presents with   • Abdominal Pain     Pt presents to the ED c/o L sided abdominal pain that radiates into her L groin     Healthy 25-year-old female with a history of  presents with left-sided abdominal pain radiating from her left flank to left groin since this morning. She describes the pain as sharp and worse with specific movements. No associated vomiting or diarrhea. No fevers or chills. Normal p.o. intake. No dysuria or hematuria. Patient does report possible constipation with irregular bowel movements most recently yesterday morning. Prior to Admission Medications   Prescriptions Last Dose Informant Patient Reported? Taking? Prenatal Vit-Fe Fumarate-FA (PRENATAL VITAMIN PO)  Self Yes No   Sig: Take by mouth in the morning      Facility-Administered Medications: None       Past Medical History:   Diagnosis Date   • Varicella     had chicken pox       Past Surgical History:   Procedure Laterality Date   •  SECTION  2014    x1   • MA  DELIVERY ONLY N/A 2022    Procedure:  SECTION () REPEAT;  Surgeon: Hiwot Trevizo MD;  Location: [facility] ;  Service: Obstetrics   • TONSILLECTOMY         Family History   Problem Relation Age of Onset   • Thyroid disease Mother    • Diabetes type II Mother    • Asthma Father    • Thyroid disease Sister    • No Known Problems Sister    • No Known Problems Brother    • Breast cancer Maternal Grandmother    • Heart disease Maternal Grandmother    • Hypertension Maternal Grandmother    • Heart disease Maternal Grandfather    • Hypertension Maternal Grandfather    • Thyroid cancer Paternal Grandmother      I have reviewed and agree with the history as documented.     E-Cigarette/Vaping   • E-Cigarette Use Never User      E-Cigarette/Vaping Substances   • Nicotine No    • THC No    • CBD No    • Flavoring No    • Other No    • Unknown No      Social History     Tobacco Use   • Smoking status: Never   • Smokeless tobacco: Never   Vaping Use   • Vaping Use: Never used   Substance Use Topics   • Alcohol use: No   • Drug use: No       Review of Systems   Constitutional: Negative for chills and fever. HENT: Negative for sore throat. Respiratory: Negative for cough and shortness of breath. Cardiovascular: Negative for chest pain. Gastrointestinal: Positive for abdominal pain and constipation. Negative for diarrhea, nausea and vomiting. Genitourinary: Positive for flank pain. Negative for dysuria, frequency and hematuria. Musculoskeletal: Negative for arthralgias and myalgias. Skin: Negative for rash. Physical Exam  Physical Exam  Constitutional:       General: She is not in acute distress. Appearance: Normal appearance. She is not toxic-appearing. HENT:      Head: Normocephalic. Nose: Nose normal.      Mouth/Throat:      Mouth: Mucous membranes are moist.      Pharynx: Oropharynx is clear. Eyes:      Conjunctiva/sclera: Conjunctivae normal.   Cardiovascular:      Rate and Rhythm: Normal rate and regular rhythm. Heart sounds: Normal heart sounds. Pulmonary:      Effort: Pulmonary effort is normal.      Breath sounds: Normal breath sounds. Abdominal:      General: There is no distension. Palpations: Abdomen is soft. Tenderness: There is no abdominal tenderness. There is no right CVA tenderness or left CVA tenderness. Musculoskeletal:         General: Normal range of motion. Cervical back: Normal range of motion. Right lower leg: No edema. Left lower leg: No edema. Skin:     General: Skin is warm and dry. Findings: No rash. Neurological:      General: No focal deficit present. Mental Status: She is alert and oriented to person, place, and time.    Psychiatric:         Mood and Affect: Mood normal.         Behavior: Behavior normal.         Vital Signs  ED Triage Vitals [07/01/23 2156]   Temperature Pulse Respirations Blood Pressure SpO2   98.5 °F (36.9 °C) 80 16 113/71 98 %      Temp Source Heart Rate Source Patient Position - Orthostatic VS BP Location FiO2 (%)   Oral Monitor Sitting -- --      Pain Score       9           Vitals:    07/01/23 2156   BP: 113/71   Pulse: 80   Patient Position - Orthostatic VS: Sitting         Visual Acuity      ED Medications  Medications   ketorolac (TORADOL) injection 15 mg (15 mg Intravenous Given 7/1/23 2253)       Diagnostic Studies  Results Reviewed     Procedure Component Value Units Date/Time    Basic metabolic panel [[id number]] Collected: 07/01/23 2229    Lab Status: Final result Specimen: Blood from Arm, Left Updated: 07/01/23 2251     Sodium 139 mmol/L      Potassium 3.5 mmol/L      Chloride 105 mmol/L      CO2 26 mmol/L      ANION GAP 8 mmol/L      BUN 12 mg/dL      Creatinine 0.73 mg/dL      Glucose 95 mg/dL      Calcium 8.9 mg/dL      eGFR 111 ml/min/1.73sq m     Narrative:      [facility] (CKD):   •  Stage 1 with normal or high GFR (GFR > 90 mL/min/1.73 square meters)  •  Stage 2 Mild CKD (GFR = 60-89 mL/min/1.73 square meters)  •  Stage 3A Moderate CKD (GFR = 45-59 mL/min/1.73 square meters)  •  Stage 3B Moderate CKD (GFR = 30-44 mL/min/1.73 square meters)  •  Stage 4 Severe CKD (GFR = 15-29 mL/min/1.73 square meters)  •  Stage 5 End Stage CKD (GFR <15 mL/min/1.73 square meters)  Note: GFR calculation is accurate only with a steady state creatinine    Hepatic function panel [[id number]]  (Abnormal) Collected: 07/01/23 2229    Lab Status: Final result Specimen: Blood from Arm, Left Updated: 07/01/23 2251     Total Bilirubin 0.17 mg/dL      Bilirubin, Direct 0.05 mg/dL      Alkaline Phosphatase 83 U/L      AST 17 U/L      ALT 13 U/L      Total Protein 7.1 g/dL      Albumin 4.1 g/dL     CBC and differential [[id number]] Collected: 07/01/23 2229    Lab Status: Final result Specimen: Blood from Arm, Left Updated: 07/01/23 2233 WBC 7.79 Thousand/uL      RBC 4.40 Million/uL      Hemoglobin 13.1 g/dL      Hematocrit 37.8 %      MCV 86 fL      MCH 29.8 pg      MCHC 34.7 g/dL      RDW 12.7 %      MPV 10.4 fL      Platelets 632 Thousands/uL      nRBC 0 /100 WBCs      Neutrophils Relative 55 %      Immat GRANS % 0 %      Lymphocytes Relative 37 %      Monocytes Relative 6 %      Eosinophils Relative 2 %      Basophils Relative 0 %      Neutrophils Absolute 4.18 Thousands/µL      Immature Grans Absolute 0.01 Thousand/uL      Lymphocytes Absolute 2.91 Thousands/µL      Monocytes Absolute 0.50 Thousand/µL      Eosinophils Absolute 0.16 Thousand/µL      Basophils Absolute 0.03 Thousands/µL     UA (URINE) with reflex to Scope [[id number]]  (Normal) Collected: 07/01/23 2207    Lab Status: Final result Specimen: Urine, Clean Catch Updated: 07/01/23 2217     Color, UA Yellow     Clarity, UA Clear     Specific Gravity, UA >=1.030     pH, UA 6.0     Leukocytes, UA Negative     Nitrite, UA Negative     Protein, UA Negative mg/dl      Glucose, UA Negative mg/dl      Ketones, UA Negative mg/dl      Urobilinogen, UA 0.2 E.U./dl      Bilirubin, UA Negative     Occult Blood, UA Negative    POCT pregnancy, urine [[id number]]  (Normal) Resulted: 07/01/23 2213    Lab Status: Final result Updated: 07/01/23 2213     EXT Preg Test, Ur Negative     Control Valid                 XR abdomen 1 view kub    (Results Pending)              Procedures  Procedures         ED Course     Healthy 65-year-old female presenting with 1 day of left flank and left-sided abdominal pain. She is well-appearing with normal vitals on arrival.  She has a nontender abdomen and no CVA tenderness. Reportedly normal p.o. intake. She is clinically well-hydrated. Screening labs show normal CBC and electrolytes with normal kidney function. Normal LFTs. UA showing no blood or evidence of infection.   Based on patient's benign physical exam as well as extremely normal laboratory work-up we will not pursue CT abdomen and pelvis. KUB x-ray was obtained which showed moderate stool burden in the transverse and descending colon. With this and patient's reported history of irregular bowel movements suspect her pain is related to constipation. Will prescribe trial of MiraLAX. Return precautions given. SBIRT 22yo+    Flowsheet Row Most Recent Value   Initial Alcohol Screen: US AUDIT-C     1. How often do you have a drink containing alcohol? 0 Filed at: 07/01/2023 2232   2. How many drinks containing alcohol do you have on a typical day you are drinking? 0 Filed at: 07/01/2023 2232   3b. FEMALE Any Age, or MALE 65+: How often do you have 4 or more drinks on one occassion? 0 Filed at: 07/01/2023 2232   Audit-C Score 0 Filed at: 07/01/2023 2232   ARI: How many times in the past year have you. .. Used an illegal drug or used a prescription medication for non-medical reasons? Never Filed at: 07/01/2023 2232                    MDM    Disposition  Final diagnoses:   Abdominal pain   Constipation     Time reflects when diagnosis was documented in both MDM as applicable and the Disposition within this note     Time User Action Codes Description Comment    7/1/2023 11:20 PM Alisa Alonzo [R10.9] Abdominal pain     7/1/2023 11:29 PM Alisa Alonzo [K59.00] Constipation       ED Disposition     ED Disposition   Discharge    Condition   Stable    Date/Time   Sat Jul 1, 2023 [id number] discharge to home/self care.                Follow-up Information     Follow up With Specialties Details Why Contact Odalis Kumar DO Family Medicine Schedule an appointment as soon as possible for a visit  As needed [id number]  [phone]            Patient's Medications   Discharge Prescriptions    POLYETHYLENE GLYCOL (GLYCOLAX) 17 GM/SCOOP POWDER    Take 17 g by mouth daily Dissolve 1 capful in 8 oz of liquid daily       Start Date: 7/1/2023  End Date: --       Order Dose: 17 g       Quantity: 510 g    Refills: 0       No discharge procedures on file.     PDMP Review     None          ED Provider  Electronically Signed by           Briseida Driscoll MD  07/01/23 6648

## 2023-07-03 ENCOUNTER — CONSULT (OUTPATIENT)
Dept: PAIN MEDICINE | Facility: CLINIC | Age: 28
End: 2023-07-03
Payer: COMMERCIAL

## 2023-07-03 VITALS
HEIGHT: 63 IN | SYSTOLIC BLOOD PRESSURE: 117 MMHG | DIASTOLIC BLOOD PRESSURE: 82 MMHG | HEART RATE: 89 BPM | WEIGHT: 181 LBS | BODY MASS INDEX: 32.07 KG/M2

## 2023-07-03 DIAGNOSIS — M79.18 MYOFASCIAL PAIN SYNDROME: Primary | ICD-10-CM

## 2023-07-03 DIAGNOSIS — M54.2 NECK PAIN: ICD-10-CM

## 2023-07-03 DIAGNOSIS — M54.9 MID BACK PAIN: ICD-10-CM

## 2023-07-03 PROCEDURE — 99204 OFFICE O/P NEW MOD 45 MIN: CPT | Performed by: STUDENT IN AN ORGANIZED HEALTH CARE EDUCATION/TRAINING PROGRAM

## 2023-07-11 ENCOUNTER — EVALUATION (OUTPATIENT)
Dept: PHYSICAL THERAPY | Facility: REHABILITATION | Age: 28
End: 2023-07-11
Payer: COMMERCIAL

## 2023-07-11 DIAGNOSIS — M54.2 CHRONIC NECK PAIN: ICD-10-CM

## 2023-07-11 DIAGNOSIS — M54.9 MID BACK PAIN: ICD-10-CM

## 2023-07-11 DIAGNOSIS — G89.29 CHRONIC NECK PAIN: ICD-10-CM

## 2023-07-11 DIAGNOSIS — M79.18 MYOFASCIAL PAIN SYNDROME: Primary | ICD-10-CM

## 2023-07-11 PROCEDURE — 97161 PT EVAL LOW COMPLEX 20 MIN: CPT | Performed by: PHYSICAL THERAPIST

## 2023-07-11 PROCEDURE — 97112 NEUROMUSCULAR REEDUCATION: CPT | Performed by: PHYSICAL THERAPIST
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
## 2023-07-14 ENCOUNTER — TELEPHONE (OUTPATIENT)
Dept: CARDIAC SURGERY | Facility: CLINIC | Age: 28
End: 2023-07-14

## 2023-07-14 NOTE — TELEPHONE ENCOUNTER
Unable to leave v/m. Box is full. Trying to contact patient to schedule a new patient consult with cardio.

## 2023-07-17 ENCOUNTER — OFFICE VISIT (OUTPATIENT)
Dept: PHYSICAL THERAPY | Facility: REHABILITATION | Age: 28
End: 2023-07-17
Payer: COMMERCIAL

## 2023-07-17 DIAGNOSIS — G89.29 CHRONIC NECK PAIN: ICD-10-CM

## 2023-07-17 DIAGNOSIS — M54.2 CHRONIC NECK PAIN: ICD-10-CM

## 2023-07-17 DIAGNOSIS — M54.9 MID BACK PAIN: ICD-10-CM

## 2023-07-17 DIAGNOSIS — M79.18 MYOFASCIAL PAIN SYNDROME: Primary | ICD-10-CM

## 2023-07-17 PROCEDURE — 97112 NEUROMUSCULAR REEDUCATION: CPT | Performed by: PHYSICAL THERAPIST

## 2023-07-17 PROCEDURE — 97140 MANUAL THERAPY 1/> REGIONS: CPT | Performed by: PHYSICAL THERAPIST

## 2023-07-17 PROCEDURE — 97110 THERAPEUTIC EXERCISES: CPT | Performed by: PHYSICAL THERAPIST
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
## 2023-07-20 ENCOUNTER — OFFICE VISIT (OUTPATIENT)
Dept: PHYSICAL THERAPY | Facility: REHABILITATION | Age: 28
End: 2023-07-20
Payer: COMMERCIAL

## 2023-07-20 DIAGNOSIS — M54.2 CHRONIC NECK PAIN: ICD-10-CM

## 2023-07-20 DIAGNOSIS — M79.18 MYOFASCIAL PAIN SYNDROME: Primary | ICD-10-CM

## 2023-07-20 DIAGNOSIS — M54.9 MID BACK PAIN: ICD-10-CM

## 2023-07-20 DIAGNOSIS — G89.29 CHRONIC NECK PAIN: ICD-10-CM

## 2023-07-20 PROCEDURE — 97110 THERAPEUTIC EXERCISES: CPT | Performed by: PHYSICAL THERAPIST

## 2023-07-20 PROCEDURE — 97112 NEUROMUSCULAR REEDUCATION: CPT | Performed by: PHYSICAL THERAPIST

## 2023-07-20 PROCEDURE — 97140 MANUAL THERAPY 1/> REGIONS: CPT | Performed by: PHYSICAL THERAPIST
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
## 2023-07-26 PROCEDURE — 99283 EMERGENCY DEPT VISIT LOW MDM: CPT

## 2023-07-27 ENCOUNTER — APPOINTMENT (EMERGENCY)
Dept: RADIOLOGY | Facility: HOSPITAL | Age: 28
End: 2023-07-27
Payer: COMMERCIAL

## 2023-07-27 ENCOUNTER — HOSPITAL ENCOUNTER (EMERGENCY)
Facility: HOSPITAL | Age: 28
Discharge: HOME | End: 2023-07-27
Attending: EMERGENCY MEDICINE
Payer: COMMERCIAL

## 2023-07-27 VITALS
DIASTOLIC BLOOD PRESSURE: 65 MMHG | OXYGEN SATURATION: 100 % | SYSTOLIC BLOOD PRESSURE: 112 MMHG | RESPIRATION RATE: 16 BRPM | HEART RATE: 80 BPM | TEMPERATURE: 99.7 F

## 2023-07-27 DIAGNOSIS — S93.401A RIGHT ANKLE SPRAIN: Primary | ICD-10-CM

## 2023-07-27 DIAGNOSIS — S93.601A SPRAIN OF RIGHT FOOT, INITIAL ENCOUNTER: ICD-10-CM

## 2023-07-27 PROCEDURE — 99284 EMERGENCY DEPT VISIT MOD MDM: CPT | Performed by: EMERGENCY MEDICINE

## 2023-07-27 PROCEDURE — 73630 X-RAY EXAM OF FOOT: CPT

## 2023-07-27 PROCEDURE — 73610 X-RAY EXAM OF ANKLE: CPT

## 2023-07-27 RX ORDER — IBUPROFEN 600 MG/1
600 TABLET ORAL ONCE
Status: COMPLETED | OUTPATIENT
Start: 2023-07-27 | End: 2023-07-27

## 2023-07-27 RX ADMIN — IBUPROFEN 600 MG: 600 TABLET, FILM COATED ORAL at 00:24

## 2023-07-27 NOTE — DISCHARGE INSTRUCTIONS
Follow up with orthopedics/outpatient providers, and return to the emergency department for new or worsening symptoms.

## 2023-07-27 NOTE — ED PROVIDER NOTES
History  Chief Complaint   Patient presents with   • Fall     Pt reports falling approx 6-7 steps, denies hitting head, no LOC. Pt c/o right foot pain      Patient is a 76-year-old female seen in the emergency department with concern for right foot and ankle pain after fall downstairs earlier in the morning on 2023. Patient states that she twisted her right ankle/foot. Patient states that she took some Tylenol at home for symptom control, with minimal improvement of symptoms. Patient notes some tingling on the plantar aspect of her right foot. Patient notes no head injury, chest pain, shortness of breath, abdominal pain, nausea, vomiting, weakness. Patient notes right foot/ankle pain, worse with weightbearing and ambulation. Prior to Admission Medications   Prescriptions Last Dose Informant Patient Reported? Taking?    Prenatal Vit-Fe Fumarate-FA (PRENATAL VITAMIN PO)  Self Yes No   Sig: Take by mouth in the morning   polyethylene glycol (GLYCOLAX) 17 GM/SCOOP powder  Self No No   Sig: Take 17 g by mouth daily Dissolve 1 capful in 8 oz of liquid daily   Patient not taking: Reported on 7/3/2023      Facility-Administered Medications: None       Past Medical History:   Diagnosis Date   • Varicella     had chicken pox       Past Surgical History:   Procedure Laterality Date   •  SECTION  2014    x1   • [place]  DELIVERY ONLY N/A 2022    Procedure:  SECTION () REPEAT;  Surgeon: Jojo Hernandez MD;  Location: [facility];  Service: Obstetrics   • TONSILLECTOMY         Family History   Problem Relation Age of Onset   • Thyroid disease Mother    • Diabetes type II Mother    • Asthma Father    • Thyroid disease Sister    • No Known Problems Sister    • No Known Problems Brother    • Breast cancer Maternal Grandmother    • Heart disease Maternal Grandmother    • Hypertension Maternal Grandmother    • Heart disease Maternal Grandfather    • Hypertension Maternal Grandfather • Thyroid cancer Paternal Grandmother      I have reviewed and agree with the history as documented. E-Cigarette/Vaping   • E-Cigarette Use Never User      E-Cigarette/Vaping Substances   • Nicotine No    • THC No    • CBD No    • Flavoring No    • Other No    • Unknown No      Social History     Tobacco Use   • Smoking status: Never   • Smokeless tobacco: Never   Vaping Use   • Vaping Use: Never used   Substance Use Topics   • Alcohol use: No   • Drug use: No       Review of Systems   Constitutional: Negative for chills and fever. HENT: Negative for ear pain and sore throat. Eyes: Negative for pain and visual disturbance. Respiratory: Negative for cough and shortness of breath. Cardiovascular: Negative for chest pain and palpitations. Gastrointestinal: Negative for abdominal pain and vomiting. Musculoskeletal: Negative for neck stiffness. Right foot/ankle pain   Skin: Negative for color change and rash. Neurological: Negative for syncope and weakness. Physical Exam  Physical Exam  Vitals and nursing note reviewed. Constitutional:       General: She is not in acute distress. Appearance: She is well-developed. HENT:      Head: Normocephalic and atraumatic. Right Ear: External ear normal.      Left Ear: External ear normal.      Nose: Nose normal.      Mouth/Throat:      Pharynx: Oropharynx is clear. Eyes:      General: No scleral icterus. Conjunctiva/sclera: Conjunctivae normal.   Cardiovascular:      Rate and Rhythm: Normal rate. Comments: well-perfused extremities  Pulmonary:      Effort: Pulmonary effort is normal. No respiratory distress. Abdominal:      General: Abdomen is flat. There is no distension. Musculoskeletal:         General: Tenderness present. No swelling or deformity. Cervical back: Normal range of motion and neck supple.       Comments: tenderness to palpation over right lateral malleolus; no appreciable tenderness palpation of right medial malleolus; normal exam of right knee; minimal tenderness to palpation of plantar aspect of right foot; neurovascularly intact right lower extremity   Skin:     General: Skin is warm and dry. Neurological:      General: No focal deficit present. Mental Status: She is alert. Cranial Nerves: No cranial nerve deficit. Sensory: No sensory deficit. Psychiatric:         Mood and Affect: Mood normal.         Thought Content: Thought content normal.         Vital Signs  ED Triage Vitals   Temperature Pulse Respirations Blood Pressure SpO2   07/27/23 0007 07/27/23 0007 07/27/23 0006 07/27/23 0007 07/27/23 0007   99.7 °F (37.6 °C) 80 16 112/65 100 %      Temp Source Heart Rate Source Patient Position - Orthostatic VS BP Location FiO2 (%)   07/27/23 0007 -- -- -- --   Oral          Pain Score       07/27/23 0024       7           Vitals:    07/27/23 0007   BP: 112/65   Pulse: 80         Visual Acuity      ED Medications  Medications   ibuprofen (MOTRIN) tablet 600 mg (600 mg Oral Given 7/27/23 0024)       Diagnostic Studies  Results Reviewed     None                 XR ankle 3+ views RIGHT   ED Interpretation by Estella Leyva MD (07/27 5006)   No acute fracture or dislocation      XR foot 3+ views RIGHT   ED Interpretation by Estelal Leyva MD (07/27 0728)   No acute fracture or dislocation                 Procedures  Procedures         ED Course                                             Medical Decision Making  Patient is a 19-year-old female seen in the emergency department with concern for right foot and ankle pain after fall. Patient was treated with medication for symptom control. X-rays of the right foot and ankle were obtained, which showed no acute fracture or dislocation. Evaluation is consistent with right ankle and foot sprain. Aircast and crutches were ordered. Plan to have patient follow up with orthopedics/PCP. Patient stable for discharge home.   Discharge instructions were reviewed with patient. Right ankle sprain: acute illness or injury  Sprain of right foot, initial encounter: acute illness or injury  Amount and/or Complexity of Data Reviewed  Radiology: ordered and independent interpretation performed. Decision-making details documented in ED Course. Risk  Prescription drug management. Disposition  Final diagnoses:   Right ankle sprain   Sprain of right foot, initial encounter     Time reflects when diagnosis was documented in both MDM as applicable and the Disposition within this note     Time User Action Codes Description Comment    7/27/2023 12:22 AM Jamir Rodriguez Add [P28.631N] Right ankle sprain     7/27/2023 12:22 AM Jamir Rodriguez Add [S93.601A] Sprain of right foot, initial encounter       ED Disposition     ED Disposition   Discharge    Condition   Stable    Date/Time   Thu Jul 27, 2023 12:55 AM    Comment   Eugenio Singletary discharge to home/self care.                Follow-up Information     Follow up With Specialties Details Why Contact Info Additional [address] [facility] Orth[facility] Surgery Call in 1 day  [address] [facility] [address] [facility] [address] [facility] ([place]), [address] [facility] [phone]    Buffy Meeks DO Family Medicine Call  As needed [address]  [phone]             Patient's Medications   Discharge Prescriptions    No medications on file           PDMP Review     None          ED Provider  Electronically Signed by           Amanda Polanco MD  07/27/23 4563

## 2023-07-31 ENCOUNTER — APPOINTMENT (OUTPATIENT)
Dept: PHYSICAL THERAPY | Facility: REHABILITATION | Age: 28
End: 2023-07-31
Payer: COMMERCIAL

## 2023-08-19 ENCOUNTER — APPOINTMENT (EMERGENCY)
Dept: RADIOLOGY | Facility: HOSPITAL | Age: 28
End: 2023-08-19
Payer: COMMERCIAL

## 2023-08-19 ENCOUNTER — HOSPITAL ENCOUNTER (EMERGENCY)
Facility: HOSPITAL | Age: 28
Discharge: HOME | End: 2023-08-19
Attending: INTERNAL MEDICINE | Admitting: INTERNAL MEDICINE
Payer: COMMERCIAL

## 2023-08-19 VITALS
TEMPERATURE: 98.1 F | RESPIRATION RATE: 18 BRPM | DIASTOLIC BLOOD PRESSURE: 65 MMHG | HEART RATE: 74 BPM | OXYGEN SATURATION: 97 % | SYSTOLIC BLOOD PRESSURE: 107 MMHG
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
## 2023-08-19 DIAGNOSIS — S80.02XA CONTUSION OF LEFT KNEE, INITIAL ENCOUNTER: Primary | ICD-10-CM

## 2023-08-19 PROCEDURE — 99284 EMERGENCY DEPT VISIT MOD MDM: CPT | Performed by: INTERNAL MEDICINE

## 2023-08-19 PROCEDURE — 73564 X-RAY EXAM KNEE 4 OR MORE: CPT

## 2023-08-19 PROCEDURE — 99283 EMERGENCY DEPT VISIT LOW MDM: CPT

## 2023-08-19 RX ORDER — ACETAMINOPHEN 325 MG/1
650 TABLET ORAL ONCE
Status: COMPLETED | OUTPATIENT
Start: 2023-08-19 | End: 2023-08-19

## 2023-08-19 RX ADMIN — ACETAMINOPHEN 650 MG: 325 TABLET, FILM COATED ORAL at 22:35

## 2023-08-20 NOTE — ED PROVIDER NOTES
History  Chief Complaint   Patient presents with   • Knee Pain     Patient arrives to the Ed with complain of left knee pain after slipping on rocks while on a nature walk. Patient state she took Tylenol prior to arrival which did help. This is a 33years old came for having fall yesterday on the rock. patient was able to walk. Patient today find that there is ecchymotic area on the medial aspect of the knee. Patient did not develop any swelling. Patient able to walk. Patient took Tylenol before she came to the ER and she is breast-feeding. Patient has no other complaints. Prior to Admission Medications   Prescriptions Last Dose Informant Patient Reported? Taking? Prenatal Vit-Fe Fumarate-FA (PRENATAL VITAMIN PO)  Self Yes No   Sig: Take by mouth in the morning   polyethylene glycol (GLYCOLAX) 17 GM/SCOOP powder  Self No No   Sig: Take 17 g by mouth daily Dissolve 1 capful in 8 oz of liquid daily   Patient not taking: Reported on 7/3/2023      Facility-Administered Medications: None       Past Medical History:   Diagnosis Date   • Varicella     had chicken pox       Past Surgical History:   Procedure Laterality Date   •  SECTION  2014    x1   • [place]  DELIVERY ONLY N/A 2022    Procedure:  SECTION () REPEAT;  Surgeon: Samson Hallman MD;  Location: [facility] ;  Service: Obstetrics   • TONSILLECTOMY         Family History   Problem Relation Age of Onset   • Thyroid disease Mother    • Diabetes type II Mother    • Asthma Father    • Thyroid disease Sister    • No Known Problems Sister    • No Known Problems Brother    • Breast cancer Maternal Grandmother    • Heart disease Maternal Grandmother    • Hypertension Maternal Grandmother    • Heart disease Maternal Grandfather    • Hypertension Maternal Grandfather    • Thyroid cancer Paternal Grandmother      I have reviewed and agree with the history as documented.     E-Cigarette/Vaping   • E-Cigarette Use Never User      E-Cigarette/Vaping Substances   • Nicotine No    • THC No    • CBD No    • Flavoring No    • Other No    • Unknown No      Social History     Tobacco Use   • Smoking status: Never   • Smokeless tobacco: Never   Vaping Use   • Vaping Use: Never used   Substance Use Topics   • Alcohol use: No   • Drug use: No       Review of Systems   Constitutional: Negative for fatigue and fever. Respiratory: Negative for cough and shortness of breath. Cardiovascular: Negative for chest pain and palpitations. Gastrointestinal: Negative for abdominal pain, diarrhea, nausea and vomiting. Genitourinary: Negative for difficulty urinating, dysuria, flank pain and frequency. Musculoskeletal: Positive for arthralgias. Negative for back pain, myalgias, neck pain and neck stiffness. Skin: Negative for color change, pallor and rash. Neurological: Negative for dizziness, light-headedness and headaches. Psychiatric/Behavioral: Negative for agitation and behavioral problems. Physical Exam  Physical Exam  Vitals and nursing note reviewed. Constitutional:       General: She is not in acute distress. Appearance: She is well-developed. She is not ill-appearing, toxic-appearing or diaphoretic. HENT:      Head: Normocephalic and atraumatic. Mouth/Throat:      Pharynx: No oropharyngeal exudate. Cardiovascular:      Rate and Rhythm: Normal rate and regular rhythm. Heart sounds: Normal heart sounds. No murmur heard. No friction rub. No gallop. Pulmonary:      Effort: Pulmonary effort is normal. No respiratory distress. Breath sounds: Normal breath sounds. No wheezing. Abdominal:      General: Bowel sounds are normal.      Palpations: Abdomen is soft. Musculoskeletal:         General: No swelling, tenderness, deformity or signs of injury. Normal range of motion. Cervical back: Normal range of motion and neck supple. Left lower leg: No edema.       Comments: Examination of the left knee; ecchymotic area on the medial aspect. No effusion, deformity, swelling, full range of motion. Sensation intact and neurovascular intact. Skin:     General: Skin is warm and dry. Capillary Refill: Capillary refill takes less than 2 seconds. Coloration: Skin is not jaundiced or pale. Findings: No bruising, erythema, lesion or rash. Neurological:      Mental Status: She is alert and oriented to person, place, and time. Psychiatric:         Behavior: Behavior normal.         Vital Signs  ED Triage Vitals [08/19/23 2204]   Temperature Pulse Respirations Blood Pressure SpO2   98.1 °F (36.7 °C) 74 18 107/65 97 %      Temp Source Heart Rate Source Patient Position - Orthostatic VS BP Location FiO2 (%)   Oral Monitor Lying Right arm --      Pain Score       7           Vitals:    08/19/23 2204   BP: 107/65   Pulse: 74   Patient Position - Orthostatic VS: Lying         Visual Acuity      ED Medications  Medications   acetaminophen (TYLENOL) tablet 650 mg (650 mg Oral Given 8/19/23 2235)       Diagnostic Studies  Results Reviewed     None                 XR knee 4+ views left injury   ED Interpretation by Nicolas Fields MD (08/19 2256)   X ray L knee; no osseous abnormalities      Final Result by Pratik Ward MD (08/20 6501)      No acute osseous abnormality. Workstation performed: [id number]                    Procedures  Procedures         ED Course                               SBIRT 22yo+    Flowsheet Row Most Recent Value   Initial Alcohol Screen: US AUDIT-C     1. How often do you have a drink containing alcohol? 0 Filed at: 08/19/2023 2209   2. How many drinks containing alcohol do you have on a typical day you are drinking? 0 Filed at: 08/19/2023 2209   3b. FEMALE Any Age, or MALE 65+: How often do you have 4 or more drinks on one occassion?  0 Filed at: 08/19/2023 2209   Audit-C Score 0 Filed at: 08/19/2023 2209   ARI: How many times in the past year have you... Used an illegal drug or used a prescription medication for non-medical reasons? Never Filed at: 08/19/2023 7537                    Medical Decision Making  This is a 33years old fell on her left knee, and [place] , [place]. Patient was able to walk after that. Patient found that she has ecchymosis at the medial aspect of the knee. Patient able to move his knee with no difficulty. Exam of the left knee; no effusion, erythema, deformity, full ROM. X-ray of left knee shows no osseous abnormalities. Patient is breast-feeding she take only Tylenol. We will discharge patient home and instructed to take Tylenol if needed. All question concerns of patient have been fully addressed. Amount and/or Complexity of Data Reviewed  Radiology: ordered and independent interpretation performed. Details: XR L knee; no osseous abnormalities      Risk  OTC drugs. Disposition  Final diagnoses:   Contusion of left knee, initial encounter   Fall, initial encounter     Time reflects when diagnosis was documented in both MDM as applicable and the Disposition within this note     Time User Action Codes Description Comment    8/19/2023 10:59 PM Clarissa Lawton Add [M25.562] Acute pain of left knee     8/19/2023 10:59 PM Ronaldo Keane Remove [M25.562] Acute pain of left knee     8/19/2023 10:59 PM Ronaldo Keane Add [S80.02XA] Contusion of left knee, initial encounter     8/19/2023 10:59 PM Ronaldo Keane Add [I53. Yvonne Salem, initial encounter       ED Disposition     ED Disposition   Discharge    Condition   Stable    Date/Time   Sat Aug 19, 2023 11:00 PM    Comment   Natali Gabriel discharge to home/self care.                Follow-up Information     Follow up With Specialties Details Why [address], DO Family Medicine In 1 week  [address]  [phone]            Discharge Medication List as of 8/19/2023 11:00 PM      CONTINUE these medications which have NOT CHANGED    Details   polyethylene glycol (GLYCOLAX) 17 GM/SCOOP powder Take 17 g by mouth daily Dissolve 1 capful in 8 oz of liquid daily, Starting Sat 7/1/2023, Normal      Prenatal Vit-Fe Fumarate-FA (PRENATAL VITAMIN PO) Take by mouth in the morning, Historical Med             No discharge procedures on file.     PDMP Review     None          ED Provider  Electronically Signed by           Devi Mejía MD  08/20/23 0666

## 2023-08-20 NOTE — DISCHARGE INSTRUCTIONS
Follow-up with your PMD.  Take Tylenol for pain if needed. Apply ice to the knee.   XR knee 4+ views left injury   ED Interpretation   X ray L knee; no osseous abnormalities

## 2024-03-27 ENCOUNTER — NURSE TRIAGE (OUTPATIENT)
Age: 29
End: 2024-03-27

## 2024-03-27 NOTE — TELEPHONE ENCOUNTER
Reason for Disposition  • SPOTTING (single or brief episode)    Protocols used: Pregnancy - Vaginal Bleeding Less Than 20 Weeks EGA-ADULT-[place]

## 2024-03-27 NOTE — TELEPHONE ENCOUNTER
"Pt called in reporting LMP 2/1/24. States she experienced cramping and heavy vaginal bleeding two nights ago. Was seen in ED, had US showing: Intrauterine gestational sac with yolk sac but no fetal pole visualized. Suspected subchorionic hemorrhage. Pt reports she is still having mild 4/10 cramping and bright red spotting. She is taking tylenol which helps. She is wearing a panty liner which she changes every few hours for hygiene. Denies any clots. Has Dating and Viability scan on 4/5/24. She is aware to call back if symptoms worsen in the meantime.     TT sent to Dr. Trujillo - no further recommendations.  Answer Assessment - Initial Assessment Questions  1. ONSET: \"When did this bleeding start?\"        Two days ago  2. DESCRIPTION: \"Describe the bleeding that you are having.\" \"How much bleeding is there?\"     - SPOTTING: spotting, or pinkish / brownish mucous discharge; does not fill panti-liner or pad     - MILD:  less than 1 pad / hour; less than patient's usual menstrual bleeding    - MODERATE: 1-2 pads / hour; 1 menstrual cup every 6 hours; small-medium blood clots (e.g., pea, grape, small coin)    - SEVERE: soaking 2 or more pads/hour for 2 or more hours; 1 menstrual cup every 2 hours; bleeding not contained by pads or continuous red blood from vagina; large blood clots (e.g., golf ball, large coin)       Started as heavy, now red spotting  3. ABDOMINAL PAIN SEVERITY: If present, ask: \"How bad is it?\"  (e.g., Scale 1-10; mild, moderate, or severe)    - MILD (1-3): doesn't interfere with normal activities, abdomen soft and not tender to touch     - MODERATE (4-7): interferes with normal activities or awakens from sleep, tender to touch     - SEVERE (8-10): excruciating pain, doubled over, unable to do any normal activities      4/10  4. PREGNANCY: \"Do you know how many weeks or months pregnant you are?\" \"When was the first day of your last normal menstrual period?\"      LMP 2/1/24  5. HEMODYNAMIC STATUS: \"Are you " "weak or feeling lightheaded?\" If Yes, ask: \"Can you stand and walk normally?\"       denies  6. OTHER SYMPTOMS: \"What other symptoms are you having with the bleeding?\" (e.g., passed tissue, vaginal discharge, fever, menstrual-type cramps)      denies    Protocols used: Pregnancy - Vaginal Bleeding Less Than 20 Weeks EGA-ADULT-[place]    "

## 2024-04-05 ENCOUNTER — INITIAL PRENATAL (OUTPATIENT)
Dept: OBGYN CLINIC | Facility: CLINIC | Age: 29
End: 2024-04-05
Payer: MEDICARE

## 2024-04-05 ENCOUNTER — TELEPHONE (OUTPATIENT)
Age: 29
End: 2024-04-05

## 2024-04-05 VITALS
BODY MASS INDEX: 32.57 KG/M2 | SYSTOLIC BLOOD PRESSURE: 140 MMHG | DIASTOLIC BLOOD PRESSURE: 80 MMHG | WEIGHT: 183.8 LBS | HEIGHT: 63 IN

## 2024-04-05 DIAGNOSIS — N91.2 AMENORRHEA: Primary | ICD-10-CM

## 2024-04-05 PROCEDURE — 76817 TRANSVAGINAL US OBSTETRIC: CPT | Performed by: PHYSICIAN ASSISTANT

## 2024-04-05 PROCEDURE — 99214 OFFICE O/P EST MOD 30 MIN: CPT | Performed by: PHYSICIAN ASSISTANT

## 2024-04-05 NOTE — PROGRESS NOTES
Assessment/Plan:  - Viable IUP @ 7w 6d EGA  - KOLTON 2024 (IVF - will confirm KOLTON with fertility records)  - Continue PNV  - Patient to call for concerns  - RTO 3 weeks for OB intake    Encounter Diagnosis     ICD-10-CM    1. Amenorrhea  N91.2 AMB US OB < 14 weeks single or first gestation level 1     Ambulatory Referral to Maternal Fetal Medicine              Subjective:       Patient ID: Bette Pimentel [id number]        Bette Pimentel is a 28 y.o.  presenting to the office for pregnancy confirmation. Patient's last menstrual period was 2024. , placing her at 9w1d. This was an IVF pregnancy and patient has her confirmation US with fertility next week to determine final EDC based on transfer date.        OB History    Para Term  AB Living   3 2 2     2   SAB IAB Ectopic Multiple Live Births         0 2      # Outcome Date GA Lbr Sebastian/2nd Weight Sex Delivery Anes PTL Lv   3 Current            2 Term 22 39w1d  3885 g (8 lb 9 oz) M CS-LTranv Spinal N EVELYNE   1 Term 14 39w0d  3515 g (7 lb 12 oz) M CS-Unspec EPI, Spinal  EVELYNE         The following portions of the patient's history were reviewed and updated as appropriate: allergies, current medications, past family history, past medical history, past social history, past surgical history, and problem list.    Allergies:  Patient has no known allergies.    Medications:    Current Outpatient Medications:     Prenatal Vit-Fe Fumarate-FA (PRENATAL VITAMIN PO), Take by mouth in the morning, Disp: , Rfl:     PROGESTERONE PO, Take 200 mg by mouth 2 (two) times a day, Disp: , Rfl:     polyethylene glycol (GLYCOLAX) 17 GM/SCOOP powder, Take 17 g by mouth daily Dissolve 1 capful in 8 oz of liquid daily (Patient not taking: Reported on 7/3/2023), Disp: 510 g, Rfl: 0      Review of Systems:   Review of Systems   Constitutional:  Positive for fatigue.   Respiratory:  Negative for shortness of breath.    Cardiovascular:  Negative for chest pain.  "  Gastrointestinal:  Positive for nausea. Negative for vomiting.   Genitourinary:  Negative for vaginal bleeding.          Objective:       Visit Vitals  /80   Ht 5' 3\" (1.6 m)   Wt 83.4 kg (183 lb 12.8 oz)   LMP 02/01/2024   BMI 32.56 kg/m²   OB Status Pregnant   Smoking Status Never   BSA 1.87 m²        GEN: The patient was alert and oriented x3, pleasant well-appearing female in no acute distress.   PULM: nonlabored respirations  MSK: Normal gait  : WNL  Skin: warm, dry  Neuro: no focal deficits  Psych: normal affect and judgement, cooperative    Ultrasound:     Viability US     Gestational sac: present               Location: intrauterine  Yolk sac: present  Fetal pole: present               CRL: 1.53 cm = 7w6d  Cardiac activity: present               Rate: 152 bpm     Ovaries: normal appearing bilaterally  Cul de sac: absence of free fluid  Uterus: normal in appearance           Ultrasound Probe Disinfection    A transvaginal ultrasound was performed.   Prior to use, disinfection was performed with High Level Disinfection Process (Trophon)  Probe serial number [id number]: [id number] was used    Ricarda Garcia PA-C  04/05/24  9:07 AM      I have spent a total time of 30 minutes on 04/05/24 in caring for this patient including Instructions for management, Patient and family education, Counseling / Coordination of care, Documenting in the medical record, Reviewing / ordering tests, medicine, procedures  , and Obtaining or reviewing history  .        "

## 2024-04-08 NOTE — PROGRESS NOTES
Patient was given progesterone in right glutea per patients request. Patient tolerated well.     Batch [id number]   NDC 55150-306-10  Expiration  05/2025

## 2024-04-09 ENCOUNTER — TELEPHONE (OUTPATIENT)
Age: 29
End: 2024-04-09

## 2024-04-10 ENCOUNTER — TELEPHONE (OUTPATIENT)
Dept: OBGYN CLINIC | Facility: CLINIC | Age: 29
End: 2024-04-10

## 2024-04-11 ENCOUNTER — TELEPHONE (OUTPATIENT)
Age: 29
End: 2024-04-11

## 2024-04-14 NOTE — PROGRESS NOTES
OB INTAKE INTERVIEW  Pt presents for OB intake  Patient is a transfer from [facility]  Office visit notes and MFM notes are seen in [facility]  Unable to see prenatal labs  Prenatal panel including 1 hour glucose ordered  Patient is going to complete tomorrow    Patient had IVF in [place]  Records scanned into media  IVF transfer done on 3/14/22  KOLTON 22  CF/SMA completed and negative  Patient declined genetic testing  PAP and Gc/Chlam done 22 and were negative  Declined TDAP and Influenza    28 week packet reviewed  -Reviewed kick counts  -Birth plan and birth certificate information  -when to call the office      Plan  -Complete prenatal labs tomorrow  -Referral placed for MFM  Suspected Macrosomia on MFM on 10/20  Patient recommend to have weekly BPP starting at 36 weeks          OB History    Para Term  AB Living   2 1 1     1   SAB IAB Ectopic Multiple Live Births           1      # Outcome Date GA Lbr Sebastian/2nd Weight Sex Delivery Anes PTL Lv   2 Current            1 Term 14 39w0d  3515 g (7 lb 12 oz) M CS-Unspec EPI, Spinal  EVELYNE     Hx of  delivery prior to 36 weeks 6 days: No  Last Menstrual Period:    Patient's last menstrual period was 2021 (exact date)  Ultrasound date: 22 9 weeks 2 days     Estimated Date of Delivery: 22      Current Issues:  Constipation :   No  Headaches :   No  Cramping:  No  Spotting :   No      Interview education  • [facility] Pregnancy Essentials reviewed and discussed   • Baby and [address] Handout  • [facility] MFM Handout  • Discussed genetic testing  • Prenatal lab work: Scripts printed and given to pt  • Influenza vaccine given today: No  • Discussed Tdap vaccine     Immunizations:   Immunization History   Administered Date(s) Administered   • Influenza Quadrivalent, 6-35 Months IM 10/09/2017   • Influenza, injectable, quadrivalent, preservative free 0 5 mL 2022   • Tdap 2014, 2019 Prior Pregnancy Delivery Complications   History of  delivery or PPROM: No  History of Shoulder Dystocia: No   History of vacuum or forceps delivery: No   History of 3rd/4th degree laceration: No   History of  section: Yes x1     Diabetes              Pregestational DM: No              hx of GDM: No              BMI >35: Yes              first degree relative with type 2 diabetes: Yes, mother              hx of PCOS: No              current metformin use: No              prior hx of LGA/macrosomia: No                 Hypertension              Hx of chronic HTN: No              hx of gestational HTN: No              hx of preeclampsia, eclampsia, or HELLP syndrome: No              Family h/o preeclampsia: No              Age 28 or older: No              Multifetal gestation: No  Type 1 or Type 2 DM: No  Renal Disease: No  Autoimmune disease (systemic lupus erythematosus, antiphospholipid antibody syndrome): No  Nulliparity: No  Obesity (BMI over 30): Yes  More than 10 year pregnancy interval: No  Previous IUGR, low birthweight or small for gestational age: No     Immunizations:              influenza vaccine: declined               discussed Tdap vaccine administration at 27-28 weeks   Covid Vaccination: Has J&J vaccine       Dental visit with last 6 months - Yes  PHQ-2/9 score: 0       MyChart activated (not 1418 years of age)?: Yes    The patient was oriented to our practice and all questions were answered    Interviewed by: Chepe Boo 10/28/22 Normal for race

## 2024-04-22 ENCOUNTER — TELEPHONE (OUTPATIENT)
Dept: OBGYN CLINIC | Facility: CLINIC | Age: 29
End: 2024-04-22

## 2024-04-22 NOTE — TELEPHONE ENCOUNTER
Placed call to the patient- she did not show up for her appointment this morning. Left a detailed voicemail to see if she wanted to reschedule that appointment vs transferring care elsewhere.

## 2024-05-30 ENCOUNTER — INITIAL PRENATAL (OUTPATIENT)
Dept: OBGYN CLINIC | Facility: CLINIC | Age: 29
End: 2024-05-30

## 2024-05-30 VITALS
SYSTOLIC BLOOD PRESSURE: 118 MMHG | DIASTOLIC BLOOD PRESSURE: 66 MMHG | HEART RATE: 83 BPM | RESPIRATION RATE: 98 BRPM | BODY MASS INDEX: 32.82 KG/M2 | HEIGHT: 63 IN | WEIGHT: 185.2 LBS

## 2024-05-30 DIAGNOSIS — Z34.82 MULTIGRAVIDA IN SECOND TRIMESTER: Primary | ICD-10-CM

## 2024-05-30 DIAGNOSIS — Z86.39 HISTORY OF THYROID NODULE: ICD-10-CM

## 2024-05-30 DIAGNOSIS — Z78.9 CONCEIVED BY IN VITRO FERTILIZATION: ICD-10-CM

## 2024-05-30 DIAGNOSIS — Z3A.15 15 WEEKS GESTATION OF PREGNANCY: ICD-10-CM

## 2024-05-30 NOTE — PROGRESS NOTES
OB INTAKE INTERVIEW  Patient is 29 y.o. who presents for OB intake at 15wks 4days  She is accompanied by Talat during this encounter  The father of her baby (Talat) is involved in the pregnancy.    Bette is a . Her pregnancy was achieved via IVF (done at [facility] in [place]). She had FET on 2024 using her own egg, harvested at age 25, and her 's sperm. She has a history of  section x 2. She has a history of a thyroid nodule, but states that she is not on thyroid medication and has not any any issues in a few years. Bette had bleeding early in pregnancy and a fetal pole was not discovered until her first visit at our office on . She has not had any care since then. Appointment was made for MFM tomorrow at 1245 during her intake appointment. Reviewed schedule for prenatal care and different providers in the office.     Last Menstrual Period: 2024  Ultrasound: Measured 7 weeks 6 days on 2024  FET done on 2024  Estimated Date of Delivery: 2024 from date of FET.    PLAN-  -Prenatal labs ordered   -Added MSAFP, TSH  -Referral placed for MFM  -Reviewed Genetic Testing options   -Patient [facility] with [facility]  -Patient to contact the office with any concerns  -RTO for OB visit with pap and cultures (thinks last pap was in  before going through treatments with previous baby)    Signs/Symptoms of Pregnancy  Current pregnancy symptoms:   Nausea YES, advised Vit B6, Unisom  Vomiting YES, occasional  Breast tenderness YES  Fatigue YES  Constipation no  Headaches YES, advised to increase fluids, Tylenol  Cramping/spotting no, not since late March when she went to the ER  PICA cravings no    Diabetes-  Body mass index is 32.81 kg/m².  If patient has 1 or more, please order early 1 hour GTT  History of GDM no  BMI >35 no  History of PCOS or current metformin use no  History of LGA/macrosomic infant (4000g/9lbs) no    If  patient has 2 or more, please order early 1 hour GTT  BMI>30 YES  AMA no  First degree relative with type 2 diabetes no  History of chronic HTN, hyperlipidemia, elevated A1C no  High risk race (, , ,  or ) no    Hypertension- if you answer yes to any of the following, please order baseline preeclampsia labs (cbc, comprehensive metabolic panel, urine protein creatinine ratio, uric acid)  History of of chronic HTN no  History of gestational HTN no  History of preeclampsia, eclampsia, or HELLP syndrome no  History of diabetes no  History of lupus, autoimmune disease, kidney disease no    Thyroid- if yes order TSH with reflex T4  History of thyroid disease YES, history of thyroid nodule    Bleeding Disorder or Hx of DVT-patient or first degree relative with history of. Order the following if not done previously.   (Factor V, antithrombin III, prothrombin gene mutation, protein C and S Ag, lupus anticoagulant, anticardiolipin, beta-2 glycoprotein)   no    OB/GYN-  History of abnormal pap smear no       Date of last pap smear thinks in   History of HPV no  History of Herpes/HSV no  History of other STI (gonorrhea, chlamydia, trich) no  History of prior  no  History of prior  YES x 2  History of  delivery prior to 36 weeks 6 days no  History of blood transfusion no  Ok for blood transfusion YES    Substance screening-   History of tobacco use no  Currently using tobacco no  Substance Use Screen Level (N/A, LOW, HIGH) NO Risk    MRSA Screening-   Does the pt have a hx of MRSA? no    Immunizations:  Discussed Tdap vaccine YES  Discussed COVID Vaccine YES    Genetic/MFM-  Do you or your partner have a history of any of the following in yourselves or first degree relatives?  Cystic fibrosis no  Spinal muscular atrophy no  Hemoglobinopathy/Sickle Cell/Thalassemia no  Fragile X Intellectual Disability no    If yes, discuss Carrier Screening and  recommend consultation with [facility]/Genetic Counseling and place specific [facility] Referral for.    If no, discuss Carrier Screening being completed once in a lifetime as a standard of care lab test. Place orders for Cystic Fibrosis Gene Test ([id number]) and Spinal Muscular Atrophy DNA ([id number])      Appointment for Nuchal Translucency Ultrasound at [facility] no scheduled- she is past the timing for a Nuchal. Appointment scheduled for 05/31/2024 for a Level I ultrasound.      Interview education  [facility] Pregnancy Essentials Book reviewed, discussed and attached to their AVS YES    Nurse/Family Partnership- patient may qualify NO; referral placed NO    Prenatal lab work scripts YES  Extra labs ordered:  TSH, MSAFP    Aspirin/Preeclampsia Screen    Risk Level Risk Factor Recommendation   LOW Prior Uncomplicated full-term delivery YES No Aspirin recommendation        MODERATE Nulliparity no Recommend low-dose aspirin if     BMI>30 YES 2 or more moderate risk factors    Family History Preeclampsia (mother/sister) no     35yr old or greater no     Black Race, Concern for SDOH/Low Socioeconomic YES, has [employer] insurance     IVF Pregnancy  YES     Personal History Risks (low birth weight, prior adverse preg outcome, >10yr preg interval) no         HIGH History of Preeclampsia no Recommend low-dose aspirin if     Multifetal gestation no 1 or more high risk factors    Chronic HTN no     Type 1 or 2 Diabetes no     Renal Disease no     Autoimmune Disease  no      Contraindications to ASA therapy:  NSAID/ ASA allergy: no  Nasal polyps: no  Asthma with history of ASA induced bronchospasm: no  Relative contraindications:  History of GI bleed: no  Active peptic ulcer disease: no  Severe hepatic dysfunction: no    Patient should be recommended to take ASA 162mg during this pregnancy from 12-36wks to lower her risk of preeclampsia: Reviewed benefits of ASA therapy with Jeanette. Aware that she is indicated for therapy and she may  discuss further with PEACEM at her appointment tomorrow.    PN1 visit scheduled. The patient was oriented to our practice, the navigator role, reviewed delivering physicians and the [facility] for Delivery. All questions were answered.    Interviewed by: Ammy Contreras RN

## 2024-06-04 ENCOUNTER — TELEPHONE (OUTPATIENT)
Age: 29
End: 2024-06-04

## 2024-06-06 ENCOUNTER — TELEPHONE (OUTPATIENT)
Dept: PERINATAL CARE | Facility: CLINIC | Age: 29
End: 2024-06-06

## 2024-06-06 NOTE — TELEPHONE ENCOUNTER
Left voicemail informing patient of the change for her upcoming Maternal Fetal Medicine appointment on 06/14 we moved it a little earlier @ 8:00 am  Requested she give our office a call back at [phone] with any questions or if she would need to reschedule.

## 2024-06-14 ENCOUNTER — ROUTINE PRENATAL (OUTPATIENT)
Dept: PERINATAL CARE | Facility: CLINIC | Age: 29
End: 2024-06-14
Payer: MEDICARE

## 2024-06-14 VITALS
WEIGHT: 185 LBS | SYSTOLIC BLOOD PRESSURE: 118 MMHG | BODY MASS INDEX: 32.78 KG/M2 | DIASTOLIC BLOOD PRESSURE: 62 MMHG | HEIGHT: 63 IN | HEART RATE: 78 BPM

## 2024-06-14 DIAGNOSIS — Z3A.15 15 WEEKS GESTATION OF PREGNANCY: ICD-10-CM

## 2024-06-14 DIAGNOSIS — Z34.82 MULTIGRAVIDA IN SECOND TRIMESTER: ICD-10-CM

## 2024-06-14 DIAGNOSIS — O09.812 PREGNANCY RESULTING FROM IN VITRO FERTILIZATION IN SECOND TRIMESTER: ICD-10-CM

## 2024-06-14 DIAGNOSIS — Z36.89 ENCOUNTER FOR FETAL ANATOMIC SURVEY: ICD-10-CM

## 2024-06-14 DIAGNOSIS — Z3A.17 17 WEEKS GESTATION OF PREGNANCY: Primary | ICD-10-CM

## 2024-06-14 PROBLEM — M25.571 ACUTE RIGHT ANKLE PAIN: Status: RESOLVED | Noted: 2023-03-01 | Resolved: 2024-06-14

## 2024-06-14 PROBLEM — R26.89 UNABLE TO BEAR WEIGHT: Status: RESOLVED | Noted: 2023-03-01 | Resolved: 2024-06-14

## 2024-06-14 PROBLEM — S93.409A SPRAIN OF ANKLE, INITIAL ENCOUNTER: Status: RESOLVED | Noted: 2023-03-01 | Resolved: 2024-06-14

## 2024-06-14 PROBLEM — Z98.891 S/P CESAREAN SECTION: Status: RESOLVED | Noted: 2022-10-28 | Resolved: 2024-06-14

## 2024-06-14 PROBLEM — H93.19 TINNITUS: Status: RESOLVED | Noted: 2017-10-09 | Resolved: 2024-06-14

## 2024-06-14 PROCEDURE — 76811 OB US DETAILED SNGL FETUS: CPT | Performed by: OBSTETRICS & GYNECOLOGY

## 2024-06-14 PROCEDURE — 99244 OFF/OP CNSLTJ NEW/EST MOD 40: CPT | Performed by: OBSTETRICS & GYNECOLOGY

## 2024-06-14 RX ORDER — ASPIRIN 81 MG/1
162 TABLET, CHEWABLE ORAL DAILY
Qty: 60 TABLET | Refills: 5 | Status: SHIPPED | OUTPATIENT
Start: 2024-06-14 | End: 2024-06-19 | Stop reason: CLARIF

## 2024-06-14 NOTE — LETTER
"2024     Bertha Trujillo MD  [address]  [address]  [facility] [address]    Patient: Bette Pimentel   YOB: 1995   Date of Visit: 2024       Dear Dr. Trujillo:    Thank you for referring Bette Pimentel to me for evaluation. Below are my notes for this consultation.    If you have questions, please do not hesitate to call me. I look forward to following your patient along with you.         Sincerely,        Nelia Romero MD        CC: No Recipients    Nelia Romero MD  2024  9:50 AM  Sign when Signing Visit  [facility]: Ms. Pimentel was seen today at 17w5d for early anatomic survey ultrasound.  See ultrasound report under \"OB Procedures\" tab.      My recommendations are as follows:  Although encouraging, even a normal-appearing ultrasound cannot exclude all malformations, or the possibility of a genetic syndrome. I offered aneuploidy screening as well as diagnostic testing, which she declined. MSAFP screening was advised given sub-optimal visualization of lumbar/sacral spine. She already has an active order from Dr. Trujillo and was advised to complete this prior to 20 weeks gestation. Follow-up to complete the anatomic survey and perform transvaginal cervical length screening was advised at around 20 weeks gestation.    Pregnancies achieved through in vitro fertilization (IVF) are associated with a slight increase in risk of several pregnancy complications, including abnormal placentation,  delivery, hypertensive disorders of pregnancy, low birth weight, and congenital malformations (specifically congenital heart disease). However, many of these risks are confounded by higher rates of multiple gestations, as well as medical complications among women undergoing IVF compared to those who conceive spontaneously. Fetal number and maternal co-morbidities are stronger predictors of pregnancy outcome than method of " conception itself. For women who achieve pregnancy through IVF, a screening fetal echocardiogram is recommended at 22-24 weeks gestation, in light of the slight increase in risk of congenital heart disease. Evaluation of fetal growth is recommended at 32 weeks gestation.  Weekly non-stress testing at 36 weeks gestation is recommended for this indication.     The use of low dose aspirin in pregnancy (162mg) is recommended in women with a high risk, or multiple moderate risk factors for preeclampsia and was recommended. Aspirin therapy should be initiated between 12-28 weeks gestation, and is most effective if started prior to 16 weeks gestation, and stopped by 36 weeks gestation. Low dose aspirin in pregnancy has been shown to reduce the incidence of preeclampsia in women with risk factors, and has been shown to be safe and without significant maternal or fetal risk.    Please don't hesitate to contact our office with any concerns or questions.    -Nelia Romero MD

## 2024-06-14 NOTE — PROGRESS NOTES
"[facility]: Ms. Pimentel was seen today at 17w5d for early anatomic survey ultrasound.  See ultrasound report under \"OB Procedures\" tab.      My recommendations are as follows:  Although encouraging, even a normal-appearing ultrasound cannot exclude all malformations, or the possibility of a genetic syndrome. I offered aneuploidy screening as well as diagnostic testing, which she declined. MSAFP screening was advised given sub-optimal visualization of lumbar/sacral spine. She already has an active order from Dr. Trujillo and was advised to complete this prior to 20 weeks gestation. Follow-up to complete the anatomic survey and perform transvaginal cervical length screening was advised at around 20 weeks gestation.    Pregnancies achieved through in vitro fertilization (IVF) are associated with a slight increase in risk of several pregnancy complications, including abnormal placentation,  delivery, hypertensive disorders of pregnancy, low birth weight, and congenital malformations (specifically congenital heart disease). However, many of these risks are confounded by higher rates of multiple gestations, as well as medical complications among women undergoing IVF compared to those who conceive spontaneously. Fetal number and maternal co-morbidities are stronger predictors of pregnancy outcome than method of conception itself. For women who achieve pregnancy through IVF, a screening fetal echocardiogram is recommended at 22-24 weeks gestation, in light of the slight increase in risk of congenital heart disease. Evaluation of fetal growth is recommended at 32 weeks gestation.  Weekly non-stress testing at 36 weeks gestation is recommended for this indication.     The use of low dose aspirin in pregnancy (162mg) is recommended in women with a high risk, or multiple moderate risk factors for preeclampsia and was recommended. Aspirin therapy should be initiated between 12-28 weeks gestation, " and is most effective if started prior to 16 weeks gestation, and stopped by 36 weeks gestation. Low dose aspirin in pregnancy has been shown to reduce the incidence of preeclampsia in women with risk factors, and has been shown to be safe and without significant maternal or fetal risk.    Please don't hesitate to contact our office with any concerns or questions.    -Nelia Romero MD

## 2024-06-17 ENCOUNTER — TELEPHONE (OUTPATIENT)
Dept: OBGYN CLINIC | Facility: CLINIC | Age: 29
End: 2024-06-17

## 2024-06-17 NOTE — TELEPHONE ENCOUNTER
"Placed call to the patient- she was a \"no-show\" for her OB appointment today. Left a voicemail for her to have the appointment rescheduled.  "

## 2024-06-19 ENCOUNTER — HOSPITAL ENCOUNTER (EMERGENCY)
Facility: HOSPITAL | Age: 29
Discharge: HOME | End: 2024-06-19
Attending: INTERNAL MEDICINE
Payer: MEDICARE

## 2024-06-19 VITALS
RESPIRATION RATE: 16 BRPM | DIASTOLIC BLOOD PRESSURE: 63 MMHG | TEMPERATURE: 98.3 F | SYSTOLIC BLOOD PRESSURE: 116 MMHG | HEART RATE: 95 BPM | OXYGEN SATURATION: 98 %

## 2024-06-19 DIAGNOSIS — O26.892 ABDOMINAL PAIN DURING PREGNANCY IN SECOND TRIMESTER: Primary | ICD-10-CM

## 2024-06-19 DIAGNOSIS — R10.9 ABDOMINAL PAIN DURING PREGNANCY IN SECOND TRIMESTER: Primary | ICD-10-CM

## 2024-06-19 LAB
ALBUMIN SERPL BCP-MCNC: 3.5 G/DL (ref 3.5–5)
ALP SERPL-CCNC: 54 U/L (ref 34–104)
ALT SERPL W P-5'-P-CCNC: 11 U/L (ref 7–52)
ANION GAP SERPL CALCULATED.3IONS-SCNC: 9 MMOL/L (ref 4–13)
AST SERPL W P-5'-P-CCNC: 13 U/L (ref 13–39)
BASOPHILS # BLD AUTO: 0.02 THOUSANDS/ÂΜL (ref 0–0.1)
BASOPHILS NFR BLD AUTO: 0 % (ref 0–1)
BILIRUB SERPL-MCNC: 0.17 MG/DL (ref 0.2–1)
BILIRUB UR QL STRIP: NEGATIVE
BUN SERPL-MCNC: 9 MG/DL (ref 5–25)
CALCIUM SERPL-MCNC: 8.9 MG/DL (ref 8.4–10.2)
CHLORIDE SERPL-SCNC: 102 MMOL/L (ref 96–108)
CLARITY UR: CLEAR
CO2 SERPL-SCNC: 25 MMOL/L (ref 21–32)
COLOR UR: YELLOW
CREAT SERPL-MCNC: 0.4 MG/DL (ref 0.6–1.3)
EOSINOPHIL # BLD AUTO: 0.11 THOUSAND/ÂΜL (ref 0–0.61)
EOSINOPHIL NFR BLD AUTO: 1 % (ref 0–6)
ERYTHROCYTE [DISTWIDTH] IN BLOOD BY AUTOMATED COUNT: 12.9 % (ref 11.6–15.1)
GFR SERPL CREATININE-BSD FRML MDRD: 140 ML/MIN/1.73SQ M
GLUCOSE SERPL-MCNC: 90 MG/DL (ref 65–140)
GLUCOSE UR STRIP-MCNC: NEGATIVE MG/DL
HCT VFR BLD AUTO: 32.6 % (ref 34.8–46.1)
HGB BLD-MCNC: 11.6 G/DL (ref 11.5–15.4)
HGB UR QL STRIP.AUTO: NEGATIVE
IMM GRANULOCYTES # BLD AUTO: 0.04 THOUSAND/UL (ref 0–0.2)
IMM GRANULOCYTES NFR BLD AUTO: 0 % (ref 0–2)
KETONES UR STRIP-MCNC: NEGATIVE MG/DL
LEUKOCYTE ESTERASE UR QL STRIP: NEGATIVE
LYMPHOCYTES # BLD AUTO: 2.28 THOUSANDS/ÂΜL (ref 0.6–4.47)
LYMPHOCYTES NFR BLD AUTO: 22 % (ref 14–44)
MCH RBC QN AUTO: 31.4 PG (ref 26.8–34.3)
MCHC RBC AUTO-ENTMCNC: 35.6 G/DL (ref 31.4–37.4)
MCV RBC AUTO: 88 FL (ref 82–98)
MONOCYTES # BLD AUTO: 0.79 THOUSAND/ÂΜL (ref 0.17–1.22)
MONOCYTES NFR BLD AUTO: 8 % (ref 4–12)
NEUTROPHILS # BLD AUTO: 7.32 THOUSANDS/ÂΜL (ref 1.85–7.62)
NEUTS SEG NFR BLD AUTO: 69 % (ref 43–75)
NITRITE UR QL STRIP: NEGATIVE
NRBC BLD AUTO-RTO: 0 /100 WBCS
PH UR STRIP.AUTO: 6 [PH]
PLATELET # BLD AUTO: 212 THOUSANDS/UL (ref 149–390)
PMV BLD AUTO: 10.3 FL (ref 8.9–12.7)
POTASSIUM SERPL-SCNC: 3.6 MMOL/L (ref 3.5–5.3)
PROT SERPL-MCNC: 6.6 G/DL (ref 6.4–8.4)
PROT UR STRIP-MCNC: NEGATIVE MG/DL
RBC # BLD AUTO: 3.69 MILLION/UL (ref 3.81–5.12)
SODIUM SERPL-SCNC: 136 MMOL/L (ref 135–147)
SP GR UR STRIP.AUTO: 1.02 (ref 1–1.03)
UROBILINOGEN UR QL STRIP.AUTO: 0.2 E.U./DL
WBC # BLD AUTO: 10.56 THOUSAND/UL (ref 4.31–10.16)

## 2024-06-19 PROCEDURE — 87086 URINE CULTURE/COLONY COUNT: CPT | Performed by: PHYSICIAN ASSISTANT

## 2024-06-19 PROCEDURE — 85025 COMPLETE CBC W/AUTO DIFF WBC: CPT | Performed by: PHYSICIAN ASSISTANT

## 2024-06-19 PROCEDURE — 99284 EMERGENCY DEPT VISIT MOD MDM: CPT | Performed by: PHYSICIAN ASSISTANT

## 2024-06-19 PROCEDURE — 81003 URINALYSIS AUTO W/O SCOPE: CPT | Performed by: PHYSICIAN ASSISTANT

## 2024-06-19 PROCEDURE — 80053 COMPREHEN METABOLIC PANEL: CPT | Performed by: PHYSICIAN ASSISTANT

## 2024-06-19 PROCEDURE — 36415 COLL VENOUS BLD VENIPUNCTURE: CPT | Performed by: PHYSICIAN ASSISTANT

## 2024-06-19 PROCEDURE — 99284 EMERGENCY DEPT VISIT MOD MDM: CPT

## 2024-06-19 NOTE — DISCHARGE INSTRUCTIONS
Get rest  Increase fluids and fiber in your diet.  Tylenol for pain.  If pain worse, follow-up with OB or GO to [facility]

## 2024-06-19 NOTE — ED PROVIDER NOTES
"History  Chief Complaint   Patient presents with    Abdominal Pain     Mild abdominal cramping since 4 am primarily around \"belly button\" and pt indicates left side..  pt is 19 weeks pregnant.  Last fetal movement 2 days ago    Abdominal Pain Pregnant     Past Medical History: Hypothyroidism, Varicella - had chicken pox  Past Surgical History:  SECTION, TONSILLECTOMY    , about 18 3/7 wkd preg by US [facility] 2024    Pt presents to ED c/o 1 day h/o sudden onset of intermittent, but persistent left -periumbilical abdominal Pain described as cramping with some nausea, no vomiting, no other complaints: No fever/chills/CP, SOB, bowel changes but patient reports constipation, no  urinary complaints, abnormal vaginal bleeding or discharge.  Good prenatal care            Prior to Admission Medications   Prescriptions Last Dose Informant Patient Reported? Taking?   Prenatal Vit-Fe Fumarate-FA (PRENATAL VITAMIN PO) 2024 Self Yes Yes   Sig: Take by mouth in the morning      Facility-Administered Medications: None       Past Medical History:   Diagnosis Date    Hypothyroidism     Varicella     had chicken pox       Past Surgical History:   Procedure Laterality Date     SECTION      MA  DELIVERY ONLY N/A 2022    Procedure:  SECTION () REPEAT;  Surgeon: Carmel Dumont MD;  Location: [facility] ;  Service: Obstetrics    TONSILLECTOMY         Family History   Problem Relation Age of Onset    Thyroid disease Mother     Diabetes type II Mother     Asthma Father     Thyroid disease Sister     No Known Problems Sister     No Known Problems Brother     No Known Problems Son     Breast cancer Maternal Grandmother     Heart disease Maternal Grandmother     Hypertension Maternal Grandmother     Heart disease Maternal Grandfather     Hypertension Maternal Grandfather     Thyroid cancer Paternal Grandmother      I have reviewed and agree with the history as " documented.    E-Cigarette/Vaping    E-Cigarette Use Never User      E-Cigarette/Vaping Substances    Nicotine No     THC No     CBD No     Flavoring No     Other No     Unknown No      Social History     Tobacco Use    Smoking status: Never    Smokeless tobacco: Never   Vaping Use    Vaping status: Never Used   Substance Use Topics    Alcohol use: Not Currently     Comment: none since pregnancy    Drug use: No       Review of Systems   Constitutional:  Negative for fever.   HENT:  Negative for sore throat.    Respiratory:  Negative for cough and shortness of breath.    Cardiovascular:  Negative for chest pain and leg swelling.   Gastrointestinal:  Positive for abdominal pain. Negative for diarrhea, nausea and vomiting.   Genitourinary:  Negative for dysuria and frequency.   Musculoskeletal:  Negative for arthralgias and myalgias.   Skin:  Negative for rash.   Neurological:  Negative for dizziness and weakness.   Psychiatric/Behavioral:  Negative for behavioral problems.    All other systems reviewed and are negative.      Physical Exam  Physical Exam  Vitals and nursing note reviewed.   Constitutional:       General: She is not in acute distress.     Appearance: She is well-developed.   HENT:      Head: Normocephalic and atraumatic.      Right Ear: External ear normal.      Left Ear: External ear normal.      Nose: Nose normal.      Mouth/Throat:      Mouth: Mucous membranes are moist.      Pharynx: Oropharynx is clear.   Eyes:      Conjunctiva/sclera: Conjunctivae normal.   Cardiovascular:      Rate and Rhythm: Normal rate and regular rhythm.   Pulmonary:      Effort: Pulmonary effort is normal. No respiratory distress.      Breath sounds: Normal breath sounds.   Abdominal:      General: Bowel sounds are normal.      Palpations: Abdomen is soft.      Tenderness: There is no abdominal tenderness.      Comments:  about 20 wks., + FHT   Musculoskeletal:         General: Normal range of motion.      Cervical  back: Normal range of motion.   Skin:     General: Skin is warm and dry.   Neurological:      General: No focal deficit present.      Mental Status: She is alert.   Psychiatric:         Behavior: Behavior normal.         Vital Signs  ED Triage Vitals [06/19/24 1546]   Temperature Pulse Respirations Blood Pressure SpO2   98.3 °F (36.8 °C) 95 16 116/63 98 %      Temp Source Heart Rate Source Patient Position - Orthostatic VS BP Location FiO2 (%)   Oral Monitor Lying -- --      Pain Score       No Pain           Vitals:    06/19/24 1546   BP: 116/63   Pulse: 95   Patient Position - Orthostatic VS: Lying         Visual Acuity      ED Medications  Medications - No data to display    Diagnostic Studies  Results Reviewed       Procedure Component Value Units Date/Time    Comprehensive metabolic panel [[id number]]  (Abnormal) Collected: 06/19/24 1614    Lab Status: Final result Specimen: Blood from Arm, Left Updated: 06/19/24 1649     Sodium 136 mmol/L      Potassium 3.6 mmol/L      Chloride 102 mmol/L      CO2 25 mmol/L      ANION GAP 9 mmol/L      BUN 9 mg/dL      Creatinine 0.40 mg/dL      Glucose 90 mg/dL      Calcium 8.9 mg/dL      AST 13 U/L      ALT 11 U/L      Alkaline Phosphatase 54 U/L      Total Protein 6.6 g/dL      Albumin 3.5 g/dL      Total Bilirubin 0.17 mg/dL      eGFR 140 ml/min/1.73sq m     Narrative:      National Kidney Disease Foundation guidelines for Chronic Kidney Disease (CKD):     Stage 1 with normal or high GFR (GFR > 90 mL/min/1.73 square meters)    Stage 2 Mild CKD (GFR = 60-89 mL/min/1.73 square meters)    Stage 3A Moderate CKD (GFR = 45-59 mL/min/1.73 square meters)    Stage 3B Moderate CKD (GFR = 30-44 mL/min/1.73 square meters)    Stage 4 Severe CKD (GFR = 15-29 mL/min/1.73 square meters)    Stage 5 End Stage CKD (GFR <15 mL/min/1.73 square meters)  Note: GFR calculation is accurate only with a steady state creatinine    UA w Reflex to Microscopic w Reflex to Culture [[id number]] Collected:  06/19/24 1609    Lab Status: Final result Specimen: Urine, Clean Catch Updated: 06/19/24 1635     Color, UA Yellow     Clarity, UA Clear     Specific Gravity, UA 1.025     pH, UA 6.0     Leukocytes, UA Negative     Nitrite, UA Negative     Protein, UA Negative mg/dl      Glucose, UA Negative mg/dl      Ketones, UA Negative mg/dl      Urobilinogen, UA 0.2 E.U./dl      Bilirubin, UA Negative     Occult Blood, UA Negative     URINE COMMENT --    Urine culture [[id number]] Collected: 06/19/24 1609    Lab Status: In process Specimen: Urine, Clean Catch Updated: 06/19/24 1634    CBC and differential [[id number]]  (Abnormal) Collected: 06/19/24 1614    Lab Status: Final result Specimen: Blood from Arm, Left Updated: 06/19/24 1628     WBC 10.56 Thousand/uL      RBC 3.69 Million/uL      Hemoglobin 11.6 g/dL      Hematocrit 32.6 %      MCV 88 fL      MCH 31.4 pg      MCHC 35.6 g/dL      RDW 12.9 %      MPV 10.3 fL      Platelets 212 Thousands/uL      nRBC 0 /100 WBCs      Segmented % 69 %      Immature Grans % 0 %      Lymphocytes % 22 %      Monocytes % 8 %      Eosinophils Relative 1 %      Basophils Relative 0 %      Absolute Neutrophils 7.32 Thousands/µL      Absolute Immature Grans 0.04 Thousand/uL      Absolute Lymphocytes 2.28 Thousands/µL      Absolute Monocytes 0.79 Thousand/µL      Eosinophils Absolute 0.11 Thousand/µL      Basophils Absolute 0.02 Thousands/µL                    No orders to display              Procedures  Procedures         ED Course  ED Course as of 06/19/24 1846   Wed Jun 19, 2024   1612  per nurse   1638 UA shows no UTI, no blood   1640 Cbc unremarkable   1650 Cmp unremarkable                               SBIRT 20yo+      Flowsheet Row Most Recent Value   Initial Alcohol Screen: US AUDIT-C     1. How often do you have a drink containing alcohol? 0 Filed at: 06/19/2024 0909   2. How many drinks containing alcohol do you have on a typical day you are drinking?  0 Filed at: 06/19/2024 6846  "  3a. Male UNDER 65: How often do you have five or more drinks on one occasion? 0 Filed at: 06/19/2024 1548   3b. FEMALE Any Age, or MALE 65+: How often do you have 4 or more drinks on one occassion? 0 Filed at: 06/19/2024 1548   Audit-C Score 0 Filed at: 06/19/2024 1548   ARI: How many times in the past year have you...    Used an illegal drug or used a prescription medication for non-medical reasons? Never Filed at: 06/19/2024 1548                      Medical Decision Making  Amount and/or Complexity of Data Reviewed  Labs: ordered.  Discussion of management or test interpretation with external provider(s): [place] chat with [facility] Dr Suresh: \"She does not need to be monitored she is only 18wks! A heart rate check is definitely enough.  She can be discharged from the ED.  If she feels better she should be okay if it gets worse she can come to Triage at [facility]\"             Disposition  Final diagnoses:   Abdominal pain during pregnancy in second trimester     Time reflects when diagnosis was documented in both MDM as applicable and the Disposition within this note       Time User Action Codes Description Comment    6/19/2024  5:10 PM Meredith Martinez Add [O26.892,  R10.9] Abdominal pain during pregnancy in second trimester           ED Disposition       ED Disposition   Discharge    Condition   Stable    Date/Time   Wed Jun 19, 2024 1710    Comment   Bette Pimentel discharge to home/self care.                   Follow-up Information       Follow up With Specialties Details Why Contact Info    Your OB                Discharge Medication List as of 6/19/2024  5:12 PM        CONTINUE these medications which have NOT CHANGED    Details   Prenatal Vit-Fe Fumarate-FA (PRENATAL VITAMIN PO) Take by mouth in the morning, Historical Med             No discharge procedures on file.    PDMP Review       None            ED Provider  Electronically Signed by             Meredith Martinez PA-C  06/19/24 9815    "

## 2024-06-20 ENCOUNTER — VBI (OUTPATIENT)
Dept: FAMILY MEDICINE CLINIC | Facility: CLINIC | Age: 29
End: 2024-06-20

## 2024-06-20 NOTE — TELEPHONE ENCOUNTER
06/20/24 4:54 PM    Patient contacted post ED visit, second outreach attempt made. Message was left for patient to return a call to the VBI Department at [facility]: Phone [phone].    Thank you.  Sayda Nolan  [facility]

## 2024-06-20 NOTE — TELEPHONE ENCOUNTER
06/20/24 10:07 AM    Patient contacted post ED visit, first outreach attempt made. Message was left for patient to return a call to the VBI Department at [facility]: Phone [phone].    Thank you.  Gisela Rose  [facility]

## 2024-06-20 NOTE — LETTER
[facility]  [address]  [address]  [facility] [address]    Date: 06/21/24    Bette Scotthissi  [address] [facility] [address]    Dear Bette:                                                                                                                                Thank you for choosing [facility] emergency department for care.  Your primary care provider wants to make sure that your ongoing medical care is being addressed. If you require follow up care as a result of your emergency department visit, there are a few things the practice would like you to know.                As part of the network's continuing commitment to caring for our patients, we have added more same day appointments and have extended office hours to meet your medical needs. After hours, on-call physicians are available via your primary care provider's main office line.               We encourage you to contact our office prior to seeking treatment to discuss your symptoms with the medical staff.  Together, we can determine the correct course of action.  A majority of non-emergent conditions such as: common cold, flu-like symptoms, fevers, strains/sprains, dislocations, minor burns, cuts and animal bites can be treated at [facility] facilities. Diagnostic testing is available at some sites.               Of course, if you are experiencing a life threatening medical emergency call 911 or proceed directly to the nearest emergency room.    Your nearest [facility] facility is conveniently located at:    [address] [address]  [phone]  SKIP THE WAIT  Conveniently offered at most [facility] locations  [place] your spot online at [email or web address] or on the [facility]    Sincerely,    [facility]  Dept: [phone]

## 2024-06-21 LAB — BACTERIA UR CULT: NORMAL

## 2024-06-21 NOTE — TELEPHONE ENCOUNTER
06/21/24 1:09 PM    Patient contacted post ED visit, third outreach attempt made. Message was left for patient to return a call to either the VBI Department at [facility]: Phone [phone] or the PCP office.     Thank you.  Sayda Nolan  [facility]

## 2024-06-21 NOTE — TELEPHONE ENCOUNTER
06/21/24 1:10 PM    Patient contacted post ED visit, phone outreaches were unsuccessful and a MyChart letter has been sent to the patient as follow-up.    Thank you.  Sayda Nolan  [facility]

## 2024-07-15 PROBLEM — U07.1 COVID-19 AFFECTING PREGNANCY IN THIRD TRIMESTER: Status: RESOLVED | Noted: 2022-09-19 | Resolved: 2024-07-15

## 2024-07-15 PROBLEM — O98.513 COVID-19 AFFECTING PREGNANCY IN THIRD TRIMESTER: Status: RESOLVED | Noted: 2022-09-19 | Resolved: 2024-07-15

## 2024-07-18 ENCOUNTER — ROUTINE PRENATAL (OUTPATIENT)
Dept: PERINATAL CARE | Facility: OTHER | Age: 29
End: 2024-07-18
Payer: MEDICARE

## 2024-07-18 VITALS
WEIGHT: 187.4 LBS | BODY MASS INDEX: 33.2 KG/M2 | HEART RATE: 94 BPM | SYSTOLIC BLOOD PRESSURE: 100 MMHG | HEIGHT: 63 IN | DIASTOLIC BLOOD PRESSURE: 68 MMHG

## 2024-07-18 DIAGNOSIS — Z36.2 ENCOUNTER FOR FOLLOW-UP ULTRASOUND OF FETAL ANATOMY: ICD-10-CM

## 2024-07-18 DIAGNOSIS — O44.40 LOW-LYING PLACENTA: ICD-10-CM

## 2024-07-18 DIAGNOSIS — Z3A.22 22 WEEKS GESTATION OF PREGNANCY: ICD-10-CM

## 2024-07-18 DIAGNOSIS — O34.219 PREVIOUS CESAREAN DELIVERY, ANTEPARTUM: ICD-10-CM

## 2024-07-18 DIAGNOSIS — O09.812 PREGNANCY RESULTING FROM IN VITRO FERTILIZATION IN SECOND TRIMESTER: Primary | ICD-10-CM

## 2024-07-18 DIAGNOSIS — Z36.86 ENCOUNTER FOR ANTENATAL SCREENING FOR CERVICAL LENGTH: ICD-10-CM

## 2024-07-18 PROCEDURE — 76815 OB US LIMITED FETUS(S): CPT | Performed by: OBSTETRICS & GYNECOLOGY

## 2024-07-18 PROCEDURE — 76817 TRANSVAGINAL US OBSTETRIC: CPT | Performed by: OBSTETRICS & GYNECOLOGY

## 2024-07-18 PROCEDURE — 99214 OFFICE O/P EST MOD 30 MIN: CPT | Performed by: OBSTETRICS & GYNECOLOGY

## 2024-07-18 NOTE — PROGRESS NOTES
Ultrasound Probe Disinfection    A transvaginal ultrasound was performed.   Prior to use, disinfection was performed with High Level Disinfection Process (Share[employer]).  Probe serial number F3: [id number] was used.      Marina CHIRINOS, PAVITHRA  07/18/24  11:46 AM

## 2024-07-18 NOTE — PROGRESS NOTES
"[facility]: Bette Pimentel was seen today for followup missed anatomy ultrasound.  See ultrasound report under \"OB Procedures\" tab.   The time spent on this established patient on the encounter date included 5 minutes previsit service time reviewing records and precharting, 10 minutes face-to-face service time counseling regarding results and coordinating care, and  5 minutes charting, totalling 20 minutes.  Please don't hesitate to contact our office with any concerns or questions.  -Joan Marlow MD      "

## 2024-08-02 ENCOUNTER — TELEPHONE (OUTPATIENT)
Facility: HOSPITAL | Age: 29
End: 2024-08-02

## 2024-09-23 ENCOUNTER — ULTRASOUND (OUTPATIENT)
Dept: PERINATAL CARE | Facility: OTHER | Age: 29
End: 2024-09-23
Payer: COMMERCIAL

## 2024-09-23 VITALS
DIASTOLIC BLOOD PRESSURE: 68 MMHG | WEIGHT: 192.8 LBS | HEIGHT: 63 IN | BODY MASS INDEX: 34.16 KG/M2 | SYSTOLIC BLOOD PRESSURE: 100 MMHG | HEART RATE: 99 BPM

## 2024-09-23 DIAGNOSIS — O09.813 PREGNANCY RESULTING FROM IN VITRO FERTILIZATION IN THIRD TRIMESTER: ICD-10-CM

## 2024-09-23 DIAGNOSIS — O44.40 LOW-LYING PLACENTA: ICD-10-CM

## 2024-09-23 DIAGNOSIS — Z3A.32 32 WEEKS GESTATION OF PREGNANCY: Primary | ICD-10-CM

## 2024-09-23 DIAGNOSIS — Z36.89 ENCOUNTER FOR ULTRASOUND TO CHECK FETAL GROWTH: ICD-10-CM

## 2024-09-23 PROCEDURE — 76817 TRANSVAGINAL US OBSTETRIC: CPT | Performed by: STUDENT IN AN ORGANIZED HEALTH CARE EDUCATION/TRAINING PROGRAM

## 2024-09-23 PROCEDURE — 76816 OB US FOLLOW-UP PER FETUS: CPT | Performed by: STUDENT IN AN ORGANIZED HEALTH CARE EDUCATION/TRAINING PROGRAM

## 2024-09-23 PROCEDURE — 99213 OFFICE O/P EST LOW 20 MIN: CPT | Performed by: STUDENT IN AN ORGANIZED HEALTH CARE EDUCATION/TRAINING PROGRAM

## 2024-09-23 NOTE — PROGRESS NOTES
Ultrasound Probe Disinfection    A transvaginal ultrasound was performed.   Prior to use, disinfection was performed with High Level Disinfection Process ([employer]).  Probe serial number F2: [id number] was used.    Guerline Block  09/23/24  2:08 PM

## 2024-09-23 NOTE — PROGRESS NOTES
"[facility]: Ms. Pimentel was seen today for followup placental location ultrasound with fetal growth measurement.  See ultrasound report under \"OB Procedures\" tab.   The time spent on this established patient on the encounter date included 5 minutes previsit service time reviewing records and precharting, 5 minutes face-to-face service time counseling regarding results and coordinating care, and  5 minutes charting, totalling 15 minutes.  Please don't hesitate to contact our office with any concerns or questions.  -Kitty Leung MD    "

## 2024-09-24 ENCOUNTER — ROUTINE PRENATAL (OUTPATIENT)
Dept: OBGYN CLINIC | Facility: CLINIC | Age: 29
End: 2024-09-24
Payer: MEDICARE

## 2024-09-24 ENCOUNTER — APPOINTMENT (OUTPATIENT)
Dept: LAB | Facility: AMBULARY SURGERY CENTER | Age: 29
End: 2024-09-24
Payer: COMMERCIAL

## 2024-09-24 VITALS — DIASTOLIC BLOOD PRESSURE: 60 MMHG | BODY MASS INDEX: 34.19 KG/M2 | WEIGHT: 193 LBS | SYSTOLIC BLOOD PRESSURE: 106 MMHG

## 2024-09-24 DIAGNOSIS — Z3A.15 15 WEEKS GESTATION OF PREGNANCY: ICD-10-CM

## 2024-09-24 DIAGNOSIS — O09.33 LIMITED PRENATAL CARE IN THIRD TRIMESTER: ICD-10-CM

## 2024-09-24 DIAGNOSIS — O09.813 PREGNANCY RESULTING FROM IN VITRO FERTILIZATION IN THIRD TRIMESTER: Primary | ICD-10-CM

## 2024-09-24 DIAGNOSIS — Z78.9 CONCEIVED BY IN VITRO FERTILIZATION: ICD-10-CM

## 2024-09-24 DIAGNOSIS — O09.813 PREGNANCY RESULTING FROM IN VITRO FERTILIZATION IN THIRD TRIMESTER: ICD-10-CM

## 2024-09-24 DIAGNOSIS — Z3A.32 32 WEEKS GESTATION OF PREGNANCY: ICD-10-CM

## 2024-09-24 DIAGNOSIS — Z86.39 HISTORY OF THYROID NODULE: ICD-10-CM

## 2024-09-24 DIAGNOSIS — Z34.82 MULTIGRAVIDA IN SECOND TRIMESTER: ICD-10-CM

## 2024-09-24 LAB
ABO GROUP BLD: NORMAL
BASOPHILS # BLD AUTO: 0.04 THOUSANDS/ΜL (ref 0–0.1)
BASOPHILS NFR BLD AUTO: 1 % (ref 0–1)
BLD GP AB SCN SERPL QL: NEGATIVE
DME PARACHUTE DELIVERY DATE REQUESTED: NORMAL
DME PARACHUTE ITEM DESCRIPTION: NORMAL
DME PARACHUTE ORDER STATUS: NORMAL
DME PARACHUTE SUPPLIER NAME: NORMAL
DME PARACHUTE SUPPLIER PHONE: NORMAL
EOSINOPHIL # BLD AUTO: 0.17 THOUSAND/ΜL (ref 0–0.61)
EOSINOPHIL NFR BLD AUTO: 2 % (ref 0–6)
ERYTHROCYTE [DISTWIDTH] IN BLOOD BY AUTOMATED COUNT: 13.2 % (ref 11.6–15.1)
EST. AVERAGE GLUCOSE BLD GHB EST-MCNC: 111 MG/DL
GLUCOSE P FAST SERPL-MCNC: 100 MG/DL (ref 65–99)
HBA1C MFR BLD: 5.5 %
HBV SURFACE AG SER QL: NORMAL
HCT VFR BLD AUTO: 32.1 % (ref 34.8–46.1)
HCV AB SER QL: NORMAL
HGB BLD-MCNC: 10.7 G/DL (ref 11.5–15.4)
IMM GRANULOCYTES # BLD AUTO: 0.05 THOUSAND/UL (ref 0–0.2)
IMM GRANULOCYTES NFR BLD AUTO: 1 % (ref 0–2)
LYMPHOCYTES # BLD AUTO: 1.86 THOUSANDS/ΜL (ref 0.6–4.47)
LYMPHOCYTES NFR BLD AUTO: 22 % (ref 14–44)
MCH RBC QN AUTO: 29.2 PG (ref 26.8–34.3)
MCHC RBC AUTO-ENTMCNC: 33.3 G/DL (ref 31.4–37.4)
MCV RBC AUTO: 88 FL (ref 82–98)
MONOCYTES # BLD AUTO: 0.52 THOUSAND/ΜL (ref 0.17–1.22)
MONOCYTES NFR BLD AUTO: 6 % (ref 4–12)
NEUTROPHILS # BLD AUTO: 5.93 THOUSANDS/ΜL (ref 1.85–7.62)
NEUTS SEG NFR BLD AUTO: 68 % (ref 43–75)
NRBC BLD AUTO-RTO: 0 /100 WBCS
PLATELET # BLD AUTO: 186 THOUSANDS/UL (ref 149–390)
PMV BLD AUTO: 11.3 FL (ref 8.9–12.7)
RBC # BLD AUTO: 3.66 MILLION/UL (ref 3.81–5.12)
RH BLD: POSITIVE
RUBV IGG SERPL IA-ACNC: 128.1 IU/ML
SPECIMEN EXPIRATION DATE: NORMAL
TREPONEMA PALLIDUM IGG+IGM AB [PRESENCE] IN SERUM OR PLASMA BY IMMUNOASSAY: NORMAL
TSH SERPL DL<=0.05 MIU/L-ACNC: 1.3 UIU/ML (ref 0.45–4.5)
VZV IGG SER QL IA: ABNORMAL
WBC # BLD AUTO: 8.57 THOUSAND/UL (ref 4.31–10.16)

## 2024-09-24 PROCEDURE — 86762 RUBELLA ANTIBODY: CPT

## 2024-09-24 PROCEDURE — 84443 ASSAY THYROID STIM HORMONE: CPT

## 2024-09-24 PROCEDURE — 86901 BLOOD TYPING SEROLOGIC RH(D): CPT

## 2024-09-24 PROCEDURE — 82105 ALPHA-FETOPROTEIN SERUM: CPT

## 2024-09-24 PROCEDURE — 36415 COLL VENOUS BLD VENIPUNCTURE: CPT

## 2024-09-24 PROCEDURE — 87086 URINE CULTURE/COLONY COUNT: CPT

## 2024-09-24 PROCEDURE — 86787 VARICELLA-ZOSTER ANTIBODY: CPT

## 2024-09-24 PROCEDURE — 86850 RBC ANTIBODY SCREEN: CPT

## 2024-09-24 PROCEDURE — 87340 HEPATITIS B SURFACE AG IA: CPT

## 2024-09-24 PROCEDURE — 82947 ASSAY GLUCOSE BLOOD QUANT: CPT

## 2024-09-24 PROCEDURE — 99214 OFFICE O/P EST MOD 30 MIN: CPT | Performed by: PHYSICIAN ASSISTANT

## 2024-09-24 PROCEDURE — 86900 BLOOD TYPING SEROLOGIC ABO: CPT

## 2024-09-24 PROCEDURE — 85025 COMPLETE CBC W/AUTO DIFF WBC: CPT

## 2024-09-24 PROCEDURE — 86803 HEPATITIS C AB TEST: CPT

## 2024-09-24 PROCEDURE — 83036 HEMOGLOBIN GLYCOSYLATED A1C: CPT

## 2024-09-24 PROCEDURE — 87389 HIV-1 AG W/HIV-1&-2 AB AG IA: CPT

## 2024-09-24 PROCEDURE — 86780 TREPONEMA PALLIDUM: CPT

## 2024-09-24 NOTE — PROGRESS NOTES
Pt is here for her 33w prenatal. Pt hasn't been seen since 15w for her intake. Redfolder due ,labs ordered and breast pump due.  Pt declined tdap.  Urine dip is neg/neg

## 2024-09-24 NOTE — PROGRESS NOTES
Patient is a 30 YO  female presenting to the office at 32.2 weeks for routine OB care.   BP: 106/60  TWG: 10lb  Fetal Movement: yes good movement   Feeling well today  LOF: no  VB: no  CTX: intermittent BH    Limited prenatal care - was in [place] for large portion of pregnancy. Did complete anatomy scan and 32wk growth with MFM     32 week growth   - EFW Hadlock 4 2062 grams - 4 lbs 9 oz (63%)     IVF - needs to complete fetal echo  - rec weekly NST/WILEY @ 36wk    Declines glucola  - willing to complete FBS and A1C    Prior c/s x 2 - Recommend RLTCS  RLTCS scheduled for  @ 8am with Dr. Dumont  Declines salpingectomy    Discussed third trimester teaching  Reviewed fetal kick counts, normal FM  Reviewed signs of PTL  Reviewed red folder, consents, birth plan  Delivery consent obtained   Breastfeeding: plans to  Breast Pump: ordered  TDAP: recommended, patient declines  FLU Vaccine: recommended, patient declines  Rhogam: not indicated  28 Week Labs: NEEDS TO COMPLETE ALL PRENATAL LABS   - Order printed for patient. Recommend she complete all labs today.   RTO 2 weeks for routine OB F/U

## 2024-09-25 LAB
HIV 1+2 AB+HIV1 P24 AG SERPL QL IA: NORMAL
HIV 2 AB SERPL QL IA: NORMAL
HIV1 AB SERPL QL IA: NORMAL
HIV1 P24 AG SERPL QL IA: NORMAL

## 2024-09-26 LAB — BACTERIA UR CULT: NORMAL

## 2024-09-27 ENCOUNTER — TELEPHONE (OUTPATIENT)
Dept: PERINATAL CARE | Facility: OTHER | Age: 29
End: 2024-09-27

## 2024-09-27 LAB
2ND TRIMESTER 4 SCREEN SERPL-IMP: ABNORMAL
AFP ADJ MOM SERPL: 9.24
AFP INTERP AMN-IMP: ABNORMAL
AFP INTERP SERPL-IMP: ABNORMAL
AFP INTERP SERPL-IMP: ABNORMAL
AFP SERPL-MCNC: 263.7 NG/ML
AGE AT DELIVERY: 30.8 YR
GA METHOD: ABNORMAL
GA: 15.9 WEEKS
IDDM PATIENT QL: NO
MULTIPLE PREGNANCY: NO
NEURAL TUBE DEFECT RISK FETUS: 10 %

## 2024-09-27 NOTE — TELEPHONE ENCOUNTER
----- Message from Alisa THOMAS sent at 9/27/2024  8:33 AM EDT -----  Regarding: RE: Abnormal AFP  The ordering provider needs to call [facility] to get the information corrected.  They just need to provide the KOLTON and that typically fixes it.  If you want I can send a message to the provider.  ----- Message -----  From: GARRY Ho  Sent: 9/27/2024   8:21 AM EDT  To: Alisa Huerta; Mar Woods  Subject: Abnormal AFP                                     Alisa and Maikol I hope you can help ss,  .  I am covering Julio batista and this patient had an MSAFP drawn at 30+ weeks of pregnancy, even though the lab orders is 15 weeks.  How to we get this results to state too late to be completed?  Nelsy  ----- Message -----  From: Lab, Background User  Sent: 9/27/2024   2:06 AM EDT  To: Nelia Romero MD      Called Ty and advised her that the MSAFP screen result was inaccurate as it was run too late in pregnancy. Verbalized understading that we are reaching out to lab.

## 2024-09-30 LAB
2ND TRIMESTER 4 SCREEN SERPL-IMP: NORMAL
AFP ADJ MOM SERPL: NORMAL
AFP INTERP AMN-IMP: NORMAL
AFP INTERP SERPL-IMP: NORMAL
AFP INTERP SERPL-IMP: NORMAL
AFP SERPL-MCNC: 263.7 NG/ML
AGE AT DELIVERY: 30.5 YR
GA METHOD: NORMAL
GA: 32.3 WEEKS
IDDM PATIENT QL: NO
MULTIPLE PREGNANCY: NO
NEURAL TUBE DEFECT RISK FETUS: NORMAL %

## 2024-10-04 ENCOUNTER — ROUTINE PRENATAL (OUTPATIENT)
Dept: PERINATAL CARE | Facility: OTHER | Age: 29
End: 2024-10-04
Payer: COMMERCIAL

## 2024-10-04 VITALS
WEIGHT: 193 LBS | HEIGHT: 63 IN | HEART RATE: 99 BPM | DIASTOLIC BLOOD PRESSURE: 62 MMHG | BODY MASS INDEX: 34.2 KG/M2 | SYSTOLIC BLOOD PRESSURE: 124 MMHG

## 2024-10-04 DIAGNOSIS — O09.813 PREGNANCY RESULTING FROM IN VITRO FERTILIZATION IN THIRD TRIMESTER: Primary | ICD-10-CM

## 2024-10-04 DIAGNOSIS — Z3A.33 33 WEEKS GESTATION OF PREGNANCY: ICD-10-CM

## 2024-10-04 PROCEDURE — 76827 ECHO EXAM OF FETAL HEART: CPT | Performed by: OBSTETRICS & GYNECOLOGY

## 2024-10-04 PROCEDURE — 99213 OFFICE O/P EST LOW 20 MIN: CPT | Performed by: OBSTETRICS & GYNECOLOGY

## 2024-10-04 PROCEDURE — 76825 ECHO EXAM OF FETAL HEART: CPT | Performed by: OBSTETRICS & GYNECOLOGY

## 2024-10-04 PROCEDURE — 93325 DOPPLER ECHO COLOR FLOW MAPG: CPT | Performed by: OBSTETRICS & GYNECOLOGY

## 2024-10-04 NOTE — PROGRESS NOTES
The patient was seen today for an ultrasound.  Please see ultrasound report (located under Ob Procedures) for additional details.   Thank you very much for allowing us to participate in the care of this very nice patient.  Should you have any questions, please do not hesitate to contact me.     Angel Luis Hendrix MD FACOG  Attending Physician, Maternal-Fetal Medicine  [facility]

## 2024-10-14 LAB
DME PARACHUTE DELIVERY DATE REQUESTED: NORMAL
DME PARACHUTE ITEM DESCRIPTION: NORMAL
DME PARACHUTE ORDER STATUS: NORMAL
DME PARACHUTE SUPPLIER NAME: NORMAL
DME PARACHUTE SUPPLIER PHONE: NORMAL

## 2024-10-16 ENCOUNTER — ROUTINE PRENATAL (OUTPATIENT)
Dept: OBGYN CLINIC | Facility: CLINIC | Age: 29
End: 2024-10-16
Payer: MEDICARE

## 2024-10-16 VITALS — DIASTOLIC BLOOD PRESSURE: 74 MMHG | BODY MASS INDEX: 34.68 KG/M2 | WEIGHT: 195.8 LBS | SYSTOLIC BLOOD PRESSURE: 118 MMHG

## 2024-10-16 DIAGNOSIS — O09.813 PREGNANCY RESULTING FROM IN VITRO FERTILIZATION IN THIRD TRIMESTER: Primary | ICD-10-CM

## 2024-10-16 DIAGNOSIS — O09.33 LIMITED PRENATAL CARE IN THIRD TRIMESTER: ICD-10-CM

## 2024-10-16 DIAGNOSIS — Z3A.35 35 WEEKS GESTATION OF PREGNANCY: ICD-10-CM

## 2024-10-16 PROCEDURE — 87491 CHLMYD TRACH DNA AMP PROBE: CPT

## 2024-10-16 PROCEDURE — 99213 OFFICE O/P EST LOW 20 MIN: CPT

## 2024-10-16 PROCEDURE — 87150 DNA/RNA AMPLIFIED PROBE: CPT

## 2024-10-16 PROCEDURE — 87591 N.GONORRHOEAE DNA AMP PROB: CPT

## 2024-10-16 NOTE — PROGRESS NOTES
Patient is a 30 YO  female presenting to the office at 35w3d for routine OB care.   Patient is feeling well today.   Had fetal echo 10/4/2024 - reassuring echo. NST/WILEY scheduled with MFM beginning at 36 weeks.   RLTCS 24 8AM Julia  Fetal heart rate: 140  GBS: collected  G/C: collected  BP: 118/74  TWlb 12.8oz  Fetal Movement: yes, good movement   LOF: no  VB: no  CTX: no  Reviewed precautions  Call for concerns  RTO 1 weeks       I have spent a total time of 20 minutes in caring for this patient on the day of the visit/encounter including Diagnostic results, Instructions for management, Patient and family education, Documenting in the medical record, Reviewing / ordering tests, medicine, procedures  , and Obtaining or reviewing history  .

## 2024-10-16 NOTE — PROGRESS NOTES
Patient states she is feeling well, no complaints. Patient would like to verify c section is scheduled with Dr. Dumont.     Urine neg/neg

## 2024-10-17 LAB
C TRACH DNA SPEC QL NAA+PROBE: NEGATIVE
N GONORRHOEA DNA SPEC QL NAA+PROBE: NEGATIVE

## 2024-10-18 LAB — GP B STREP DNA SPEC QL NAA+PROBE: NEGATIVE

## 2024-10-21 ENCOUNTER — TELEPHONE (OUTPATIENT)
Dept: OBGYN CLINIC | Facility: CLINIC | Age: 29
End: 2024-10-21

## 2024-10-21 NOTE — TELEPHONE ENCOUNTER
.Overall how are you feeling?  Tiredness , good     Compliant with routine OB appointments? yes    Have you completed your 3rd trimester lab work? yes    Have you reviewed the contents of 3rd trimester folder from office?  yes   Have you decided on a pediatrician? yes    If yes, who  Dr Lyon     If no, reviewed practices and transferred call to [phone] to set up appointment with pediatric office.   Questions on paperwork to go back to office? no   Questions on the baby birth certificate and photography forms?  No      Send link for the Hospital Readiness Video via [employer]

## 2024-10-22 ENCOUNTER — ULTRASOUND (OUTPATIENT)
Facility: HOSPITAL | Age: 29
End: 2024-10-22
Payer: COMMERCIAL

## 2024-10-22 VITALS
SYSTOLIC BLOOD PRESSURE: 122 MMHG | HEART RATE: 88 BPM | WEIGHT: 196.8 LBS | DIASTOLIC BLOOD PRESSURE: 70 MMHG | HEIGHT: 63 IN | BODY MASS INDEX: 34.87 KG/M2

## 2024-10-22 DIAGNOSIS — O09.813 PREGNANCY RESULTING FROM IN VITRO FERTILIZATION IN THIRD TRIMESTER: ICD-10-CM

## 2024-10-22 DIAGNOSIS — Z3A.36 36 WEEKS GESTATION OF PREGNANCY: Primary | ICD-10-CM

## 2024-10-22 PROBLEM — R73.01 ELEVATED FASTING GLUCOSE: Status: ACTIVE | Noted: 2024-10-22

## 2024-10-22 PROBLEM — R73.01 ELEVATED FASTING GLUCOSE: Status: RESOLVED | Noted: 2024-10-22 | Resolved: 2024-10-22

## 2024-10-22 PROCEDURE — 59025 FETAL NON-STRESS TEST: CPT | Performed by: STUDENT IN AN ORGANIZED HEALTH CARE EDUCATION/TRAINING PROGRAM

## 2024-10-22 PROCEDURE — 76815 OB US LIMITED FETUS(S): CPT | Performed by: STUDENT IN AN ORGANIZED HEALTH CARE EDUCATION/TRAINING PROGRAM

## 2024-10-22 NOTE — PROGRESS NOTES
Non-Stress Testing:    Non-Stress test, equipment, procedure, and expected outcomes explained. Reviewed fetal kick counts and when to call OB.Verified patient understanding of fetal kick counts with teach back method. Patient reports feeling daily fetal movements. Patient has no questions or concerns.     Reviewed non-stress test with Dr. Leung.

## 2024-10-22 NOTE — PROGRESS NOTES
This patient received  care under my supervision on 10/22/24 at 36w2d gestational age at [facility].  NST is reactive.  -Kitty Leung MD

## 2024-10-23 ENCOUNTER — ROUTINE PRENATAL (OUTPATIENT)
Dept: OBGYN CLINIC | Facility: CLINIC | Age: 29
End: 2024-10-23
Payer: MEDICARE

## 2024-10-23 VITALS — DIASTOLIC BLOOD PRESSURE: 70 MMHG | BODY MASS INDEX: 34.54 KG/M2 | SYSTOLIC BLOOD PRESSURE: 110 MMHG | WEIGHT: 195 LBS

## 2024-10-23 DIAGNOSIS — Z3A.36 36 WEEKS GESTATION OF PREGNANCY: Primary | ICD-10-CM

## 2024-10-23 PROCEDURE — 99212 OFFICE O/P EST SF 10 MIN: CPT | Performed by: PHYSICIAN ASSISTANT

## 2024-10-25 ENCOUNTER — ANESTHESIA (INPATIENT)
Dept: LABOR AND DELIVERY | Facility: HOSPITAL | Age: 29
End: 2024-10-25
Payer: COMMERCIAL

## 2024-10-25 ENCOUNTER — HOSPITAL ENCOUNTER (OUTPATIENT)
Facility: HOSPITAL | Age: 29
Discharge: HOME | End: 2024-10-25
Attending: OBSTETRICS & GYNECOLOGY | Admitting: OBSTETRICS & GYNECOLOGY
Payer: MEDICARE

## 2024-10-25 ENCOUNTER — NURSE TRIAGE (OUTPATIENT)
Age: 29
End: 2024-10-25

## 2024-10-25 ENCOUNTER — ANESTHESIA EVENT (INPATIENT)
Dept: LABOR AND DELIVERY | Facility: HOSPITAL | Age: 29
End: 2024-10-25
Payer: COMMERCIAL

## 2024-10-25 VITALS
HEART RATE: 96 BPM | DIASTOLIC BLOOD PRESSURE: 67 MMHG | SYSTOLIC BLOOD PRESSURE: 104 MMHG | RESPIRATION RATE: 16 BRPM | TEMPERATURE: 98 F

## 2024-10-25 PROBLEM — O47.9 UTERINE CONTRACTIONS: Status: ACTIVE | Noted: 2024-10-25

## 2024-10-25 LAB
ABO GROUP BLD: NORMAL
BLD GP AB SCN SERPL QL: NEGATIVE
ERYTHROCYTE [DISTWIDTH] IN BLOOD BY AUTOMATED COUNT: 13.5 % (ref 11.6–15.1)
HCT VFR BLD AUTO: 31.9 % (ref 34.8–46.1)
HGB BLD-MCNC: 10.8 G/DL (ref 11.5–15.4)
HOLD SPECIMEN: NORMAL
MCH RBC QN AUTO: 28.5 PG (ref 26.8–34.3)
MCHC RBC AUTO-ENTMCNC: 33.9 G/DL (ref 31.4–37.4)
MCV RBC AUTO: 84 FL (ref 82–98)
PLATELET # BLD AUTO: 178 THOUSANDS/UL (ref 149–390)
PMV BLD AUTO: 11.5 FL (ref 8.9–12.7)
RBC # BLD AUTO: 3.79 MILLION/UL (ref 3.81–5.12)
RH BLD: POSITIVE
SPECIMEN EXPIRATION DATE: NORMAL
TREPONEMA PALLIDUM IGG+IGM AB [PRESENCE] IN SERUM OR PLASMA BY IMMUNOASSAY: NORMAL
WBC # BLD AUTO: 11.81 THOUSAND/UL (ref 4.31–10.16)

## 2024-10-25 PROCEDURE — 85027 COMPLETE CBC AUTOMATED: CPT

## 2024-10-25 PROCEDURE — 86901 BLOOD TYPING SEROLOGIC RH(D): CPT

## 2024-10-25 PROCEDURE — 86780 TREPONEMA PALLIDUM: CPT

## 2024-10-25 PROCEDURE — 99214 OFFICE O/P EST MOD 30 MIN: CPT

## 2024-10-25 PROCEDURE — NC001 PR NO CHARGE: Performed by: OBSTETRICS & GYNECOLOGY

## 2024-10-25 PROCEDURE — 86850 RBC ANTIBODY SCREEN: CPT

## 2024-10-25 PROCEDURE — 86900 BLOOD TYPING SEROLOGIC ABO: CPT

## 2024-10-25 RX ADMIN — SODIUM CHLORIDE, SODIUM LACTATE, POTASSIUM CHLORIDE, AND CALCIUM CHLORIDE 1000 ML: .6; .31; .03; .02 INJECTION, SOLUTION INTRAVENOUS at 13:01

## 2024-10-25 NOTE — DISCHARGE INSTRUCTIONS
Pregnancy at 35 to 38 Weeks   WHAT YOU NEED TO KNOW:   What changes are happening with my body?  You are considered full term at the beginning of 37 weeks. Your breathing may be easier if your baby has moved down into a head-down position. You may need to urinate more often because the baby may be pressing on your bladder. You may also feel more discomfort and get tired easily.  How do I care for myself at this stage of my pregnancy?       Eat a variety of healthy foods.  Healthy foods include fruits, vegetables, whole-grain breads, low-fat dairy foods, beans, lean meats, and fish. Drink liquids as directed. Ask how much liquid to drink each day and which liquids are best for you. Limit caffeine to less than 200 milligrams each day. Limit your intake of fish to 2 servings each week. Choose fish low in mercury such as canned light tuna, shrimp, salmon, cod, or tilapia. Do not  eat fish high in mercury such as swordfish, tilefish, catie mackerel, and shark.         Take prenatal vitamins as directed.  Your need for certain vitamins and minerals, such as folic acid, increases during pregnancy. Prenatal vitamins provide some of the extra vitamins and minerals you need. Prenatal vitamins may also help to decrease the risk of certain birth defects.         Rest as needed.  Put your feet up if you have swelling in your ankles and feet.         Talk to your healthcare provider about exercise.  Moderate exercise can help you stay fit. Your healthcare provider will help you plan an exercise program that is safe for you during pregnancy.         Do not smoke.  Smoking increases your risk of a miscarriage and other health problems during your pregnancy. Smoking can cause your baby to be born early or weigh less at birth. Ask your healthcare provider for information if you need help quitting.    Do not drink alcohol.  Alcohol passes from your body to your baby through the placenta. It can affect your baby's brain development and  cause fetal alcohol syndrome (FAS). FAS is a group of conditions that causes mental, behavior, and growth problems.    Talk to your healthcare provider before you take any medicines.  Many medicines may harm your baby if you take them when you are pregnant. Do not take any medicines, vitamins, herbs, or supplements without first talking to your healthcare provider. Never use illegal or street drugs (such as marijuana or cocaine) while you are pregnant.    What are some safety tips during pregnancy?   Avoid hot tubs and saunas.  Do not use a hot tub or sauna while you are pregnant, especially during your first trimester. Hot tubs and saunas may raise your baby's temperature and increase the risk of birth defects.    Avoid toxoplasmosis.  This is an infection caused by eating raw meat or being around infected cat feces. It can cause birth defects, miscarriages, and other problems. Wash your hands after you touch raw meat. Make sure any meat is well-cooked before you eat it. Avoid raw eggs and unpasteurized milk. Use gloves or ask someone else to clean your cat's litter box while you are pregnant.         Ask your healthcare provider about travel.  The most comfortable time to travel is during the second trimester. Ask your provider if you can travel after 36 weeks. You may not be able to travel in an airplane after 36 weeks. He or she may also recommend you avoid long road trips.    What changes are happening with my baby?  By 38 weeks, your baby may weigh between 6 and 9 pounds. Your baby may be about 14 inches long from the top of the head to the rump (baby's bottom). Your baby hears well enough to know your voice. As your baby gets larger, you may feel fewer kicks and more stretching and rolling. Your baby may move into a head-down position. Your baby will also rest lower in your abdomen.  What do I need to know about prenatal care?  Your healthcare provider will check your blood pressure and weight. You may also  need the following:  A urine test  may also be done to check for sugar and protein. These can be signs of gestational diabetes or infection. Protein in your urine may also be a sign of preeclampsia. Preeclampsia is a condition that can develop during week 20 or later of your pregnancy. It causes high blood pressure, and it can cause problems with your kidneys and other organs.    A gestational diabetes screen  may be done. Your healthcare provider may order either a 1-step or 2-step oral glucose tolerance test (OGTT).     1-step OGTT:  Your blood sugar level will be tested after you have not eaten for 8 hours (fasting). You will then be given a glucose drink. Your level will be tested again 1 hour and 2 hours after you finish the drink.    2-step OGTT:  You do not have to fast for the first part of the test. You will have the glucose drink at any time of day. Your blood sugar level will be checked 1 hour later. If your blood sugar is higher than a certain level, another test will be ordered. You will fast and your blood sugar level will be tested. You will have the glucose drink. Your blood will be tested again 1 hour, 2 hours, and 3 hours after you finish the glucose drink.    A blood test  may be done to check for anemia (low iron level).    A Tdap vaccine  may be recommended by your healthcare provider.    A group B strep test  is a test that is done to check for group B strep infection. Group B strep is a type of bacteria that may be found in the vagina or rectum. It can be passed to your baby during delivery if you have it. Your healthcare provider will take swab your vagina or rectum and send the sample to the lab for tests.    Fundal height  is a measurement of your uterus to check your baby's growth. This number is usually the same as the number of weeks that you have been pregnant. Your healthcare provider may also check your baby's position.    Your baby's heart rate  will be checked.    When should I seek  immediate care?   You develop a severe headache that does not go away.    You have new or increased vision changes, such as blurred or spotted vision.    You have new or increased swelling in your face or hands.    You have vaginal spotting or bleeding.    Your water broke or you feel warm water gushing or trickling from your vagina.    When should I call my obstetrician?   You have more than 5 contractions in 1 hour.    You notice any changes in your baby's movements.    You have abdominal cramps, pressure, or tightening.    You have a change in vaginal discharge.    You have chills or a fever.    You have vaginal itching, burning, or pain.    You have yellow, green, white, or foul-smelling vaginal discharge.    You have pain or burning when you urinate, less urine than usual, or pink or bloody urine.    You have questions or concerns about your condition or care.    CARE AGREEMENT:   You have the right to help plan your care. Learn about your health condition and how it may be treated. Discuss treatment options with your healthcare providers to decide what care you want to receive. You always have the right to refuse treatment. The above information is an  only. It is not intended as medical advice for individual conditions or treatments. Talk to your doctor, nurse or pharmacist before following any medical regimen to see if it is safe and effective for you.  © Copyright [employer] 2022 Information is for End User's use only and may not be sold, redistributed or otherwise used for commercial purposes. All illustrations and images included in CareNotes® are the copyrighted property of [employer] or [employer]

## 2024-10-25 NOTE — QUICK NOTE
Patient reassessed after 2h; NST reactive, contractions irregular on tocometry. SVE unchanged, she reports still feeling irregular contractions. She was recommended for discharge at this time as it does not appear she is in labor; labor precautions discussed including recommendation to return if contractions increase in intensity/frequency, leakage of fluid, vaginal bleeding, or decreased fetal movement. Bette agrees with the plan.    Discussed plan with Dr. Violette Lancaster MD  PGY 2, Obstetrics and Gynecology  10/25/2024  2:45 PM

## 2024-10-25 NOTE — TELEPHONE ENCOUNTER
"Patient is 36w5d  calling with spouse to report contractions since 4am. Started 15 mins apart and are currently coming every 5 mins for about 30 secs each. Good fetal movement. Denies LOF or Vaginal bleeding. They are already on there way and will be there in a few minutes. ESC to on call Dr. Padron and unit charge RN making them aware of in coming patient.    Reason for Disposition  • Contractions < 10 minutes apart for 1 hour (i.e., 6 or more contractions an hour)    Answer Assessment - Initial Assessment Questions  1. ONSET: \"When did the symptoms begin?\"         contractions  2. CONTRACTIONS: \"Describe the contractions that you are having.\" (e.g., duration, frequency, regularity, severity)      20-30 sec each 5-15 mins apart  3. PREGNANCY: \"How many weeks pregnant are you?\"      36w5d  4. KOLTON: \"What date are you expecting to deliver?\"      2024  5. PARITY: \"Have you had a baby before?\" If Yes, ask: \"How long did the labor last?\"      multip  6. FETAL MOVEMENT: \"Has the baby's movement decreased or changed significantly from normal?\"      good  7. OTHER SYMPTOMS: \"Do you have any other symptoms?\" (e.g., leaking fluid from vagina, fever, hand/facial swelling)      Denoes lof and vaginal bleeding    Protocols used: Pregnancy - Labor - -Adult-OH    "

## 2024-10-25 NOTE — PROGRESS NOTES
L&D Triage Note - OB/GYN  Bette Pimentel 29 y.o. female MRN: [id number]  Unit/Bed#:  TRIAGE  Encounter: [id number]      ASSESSMENT:    Bette Pimentel is a 29 y.o.  at 36w5d who was evaluated today in triage for contractions, r/o labor, with history of 2 prior C/S deliveries. SVE 2.5/50/-3 with uterine irritability on tocometer. NST reactive.Plan for collection of admission labs, IV fluids, and 2h recheck.    PLAN:    1) Contractions  - SVE: 2.5/50/-3  - [place]: Irritable  -IV fluids, reassess in 2h  -NPO     - Case discussed with Dr. Oakes     SUBJECTIVE:    Bette Pimentel 29 y.o.  at 36w5d with an Estimated Date of Delivery: 24 presenting with contractions since 4 AM that have been increasing in frequency.  She denies leakage of fluid, vaginal bleeding, and is feeling good fetal movement.  She has a history of 2 prior C-sections and will be for repeat  delivery in this pregnancy.  She denies other medical problems and is not taking any medications.  She has had limited prenatal care in this pregnancy. She last ate at 12:30 pm.      Her past obstetrical history is significant for 2 prior C/S.  Contractions: prsent  Leakage of fluid: Denies  Vaginal Bleeding: Denies  Fetal movement: present    OBJECTIVE:    Vitals:    10/25/24 1144   BP: 104/67   Pulse: 96   Resp:    Temp:        ROS:  Constitutional: Negative  Respiratory: Negative  Cardiovascular: Negative    Gastrointestinal: Negative    General Physical Exam:  General: Mildly uncomfortable, not in distress  Respiratory: Unlabored breathing  Cardiovascular: Regular rate.  Abdomen: Soft, gravid, nontender  Fundal Height: Appropriate for gestational age.  Extremities: Warm and well perfused.  Non tender.      Fetal monitoring:  Fetal heart rate: Baseline Rate (FHR): 145 bpm  Variability: Moderate  Accelerations: 15 x 15 or greater, At variable times  Decelerations: Variable, [place] not <80 bpm, For < 60 seconds  FHR Category:  Category II  [place]: Contraction Frequency (minutes): ctx x1  Contraction Duration (seconds): 80  Contraction Intensity: Mild      Cervical Exam  SVE: 2.5/50/-3        Tatyana Lancaster MD  PGY 2, Obstetrics and Gynecology  10/25/2024  12:56 PM

## 2024-10-29 ENCOUNTER — ULTRASOUND (OUTPATIENT)
Facility: HOSPITAL | Age: 29
End: 2024-10-29
Payer: COMMERCIAL

## 2024-10-29 VITALS
HEART RATE: 96 BPM | WEIGHT: 196.8 LBS | SYSTOLIC BLOOD PRESSURE: 98 MMHG | DIASTOLIC BLOOD PRESSURE: 58 MMHG | BODY MASS INDEX: 34.87 KG/M2 | HEIGHT: 63 IN

## 2024-10-29 DIAGNOSIS — Z3A.37 37 WEEKS GESTATION OF PREGNANCY: Primary | ICD-10-CM

## 2024-10-29 DIAGNOSIS — O09.813 PREGNANCY RESULTING FROM IN VITRO FERTILIZATION IN THIRD TRIMESTER: ICD-10-CM

## 2024-10-29 PROCEDURE — 76815 OB US LIMITED FETUS(S): CPT | Performed by: OBSTETRICS & GYNECOLOGY

## 2024-10-29 PROCEDURE — 59025 FETAL NON-STRESS TEST: CPT | Performed by: OBSTETRICS & GYNECOLOGY

## 2024-10-29 NOTE — PROGRESS NOTES
Repeat Non-Stress Testing:    Patient verbalizes +FM. Pt denies ALL:               Leaking of fluid   Contractions   Vaginal bleeding   Decreased fetal movement    Patient is performing daily kick counts. Patient has no questions or concerns.   NST strip reviewed by Dr. Marlow.

## 2024-11-04 NOTE — PROGRESS NOTES
29 y.o.  female at 38w1d (Estimated Date of Delivery: 24) for PNV.    Pre- Vitals      Flowsheet Row Most Recent Value   Prenatal Assessment    Fetal Heart Rate 140s   Movement Present   Prenatal Vitals    Blood Pressure 110/70   Weight - Scale 88.5 kg (195 lb)   Urine Albumin/Glucose    Dilation/Effacement/Station    Vaginal Drainage    Edema           TW.443 kg (12 lb)    Leakage of fluid: no  Vaginal bleeding: no  Contractions/Cramping: yes--mild, occ  Fetal movement: yes  Overall, feeling well  GBS negative    RTO in 1 weeks.

## 2024-11-07 ENCOUNTER — NURSE TRIAGE (OUTPATIENT)
Dept: OTHER | Facility: OTHER | Age: 29
End: 2024-11-07

## 2024-11-07 ENCOUNTER — HOSPITAL ENCOUNTER (INPATIENT)
Facility: HOSPITAL | Age: 29
LOS: 3 days | Discharge: HOME | End: 2024-11-10
Attending: OBSTETRICS & GYNECOLOGY | Admitting: OBSTETRICS & GYNECOLOGY
Payer: COMMERCIAL

## 2024-11-07 DIAGNOSIS — Z98.891 S/P REPEAT LOW TRANSVERSE C-SECTION: ICD-10-CM

## 2024-11-07 DIAGNOSIS — O34.219 PREVIOUS CESAREAN DELIVERY AFFECTING PREGNANCY: ICD-10-CM

## 2024-11-07 DIAGNOSIS — O34.219 PREVIOUS CESAREAN DELIVERY, ANTEPARTUM: ICD-10-CM

## 2024-11-07 PROBLEM — Z3A.38 38 WEEKS GESTATION OF PREGNANCY: Status: ACTIVE | Noted: 2024-06-14

## 2024-11-07 LAB
ABO GROUP BLD: NORMAL
BASE EXCESS BLDCOA CALC-SCNC: -6.3 MMOL/L (ref 3–11)
BASE EXCESS BLDCOV CALC-SCNC: -5.1 MMOL/L (ref 1–9)
BLD GP AB SCN SERPL QL: NEGATIVE
ERYTHROCYTE [DISTWIDTH] IN BLOOD BY AUTOMATED COUNT: 13.8 % (ref 11.6–15.1)
HCO3 BLDCOA-SCNC: 21.2 MMOL/L (ref 17.3–27.3)
HCO3 BLDCOV-SCNC: 21.7 MMOL/L (ref 12.2–28.6)
HCT VFR BLD AUTO: 32.5 % (ref 34.8–46.1)
HGB BLD-MCNC: 11 G/DL (ref 11.5–15.4)
MCH RBC QN AUTO: 28 PG (ref 26.8–34.3)
MCHC RBC AUTO-ENTMCNC: 33.8 G/DL (ref 31.4–37.4)
MCV RBC AUTO: 83 FL (ref 82–98)
O2 CT VFR BLDCOA CALC: 7.4 ML/DL
OXYHGB MFR BLDCOA: 35.5 %
OXYHGB MFR BLDCOV: 62.1 %
PCO2 BLDCOA: 49.4 MM[HG] (ref 30–60)
PCO2 BLDCOV: 47 MM HG (ref 27–43)
PH BLDCOA: 7.25 [PH] (ref 7.23–7.43)
PH BLDCOV: 7.28 [PH] (ref 7.19–7.49)
PLATELET # BLD AUTO: 183 THOUSANDS/UL (ref 149–390)
PMV BLD AUTO: 11.7 FL (ref 8.9–12.7)
PO2 BLDCOA: 19 MM HG (ref 5–25)
PO2 BLDCOV: 28.2 MM HG (ref 15–45)
RBC # BLD AUTO: 3.93 MILLION/UL (ref 3.81–5.12)
RH BLD: POSITIVE
SAO2 % BLDCOV: 12.1 ML/DL
SPECIMEN EXPIRATION DATE: NORMAL
TREPONEMA PALLIDUM IGG+IGM AB [PRESENCE] IN SERUM OR PLASMA BY IMMUNOASSAY: NORMAL
WBC # BLD AUTO: 8.79 THOUSAND/UL (ref 4.31–10.16)

## 2024-11-07 PROCEDURE — 86901 BLOOD TYPING SEROLOGIC RH(D): CPT

## 2024-11-07 PROCEDURE — 86850 RBC ANTIBODY SCREEN: CPT

## 2024-11-07 PROCEDURE — 82805 BLOOD GASES W/O2 SATURATION: CPT | Performed by: OBSTETRICS & GYNECOLOGY

## 2024-11-07 PROCEDURE — NC001 PR NO CHARGE: Performed by: OBSTETRICS & GYNECOLOGY

## 2024-11-07 PROCEDURE — 59510 CESAREAN DELIVERY: CPT | Performed by: OBSTETRICS & GYNECOLOGY

## 2024-11-07 PROCEDURE — 86900 BLOOD TYPING SEROLOGIC ABO: CPT

## 2024-11-07 PROCEDURE — 85027 COMPLETE CBC AUTOMATED: CPT

## 2024-11-07 PROCEDURE — 4A1HXCZ MONITORING OF PRODUCTS OF CONCEPTION, CARDIAC RATE, EXTERNAL APPROACH: ICD-10-PCS | Performed by: OBSTETRICS & GYNECOLOGY

## 2024-11-07 PROCEDURE — 86780 TREPONEMA PALLIDUM: CPT

## 2024-11-07 PROCEDURE — 99213 OFFICE O/P EST LOW 20 MIN: CPT

## 2024-11-07 RX ORDER — ONDANSETRON 2 MG/ML
INJECTION INTRAMUSCULAR; INTRAVENOUS AS NEEDED
Status: DISCONTINUED | OUTPATIENT
Start: 2024-11-07 | End: 2024-11-07

## 2024-11-07 RX ORDER — NALBUPHINE HYDROCHLORIDE 10 MG/ML
5 INJECTION INTRAMUSCULAR; INTRAVENOUS; SUBCUTANEOUS
Status: DISCONTINUED | OUTPATIENT
Start: 2024-11-07 | End: 2024-11-10 | Stop reason: HOSPADM

## 2024-11-07 RX ORDER — CALCIUM CARBONATE 500 MG/1
1000 TABLET, CHEWABLE ORAL DAILY PRN
Status: DISCONTINUED | OUTPATIENT
Start: 2024-11-07 | End: 2024-11-10 | Stop reason: HOSPADM

## 2024-11-07 RX ORDER — BENZOCAINE/MENTHOL 6 MG-10 MG
1 LOZENGE MUCOUS MEMBRANE DAILY PRN
Status: DISCONTINUED | OUTPATIENT
Start: 2024-11-07 | End: 2024-11-10 | Stop reason: HOSPADM

## 2024-11-07 RX ORDER — HYDROMORPHONE HCL/PF 1 MG/ML
0.5 SYRINGE (ML) INJECTION EVERY 2 HOUR PRN
Status: DISCONTINUED | OUTPATIENT
Start: 2024-11-07 | End: 2024-11-10 | Stop reason: HOSPADM

## 2024-11-07 RX ORDER — OXYCODONE HYDROCHLORIDE 5 MG/1
5 TABLET ORAL EVERY 4 HOURS PRN
Status: DISCONTINUED | OUTPATIENT
Start: 2024-11-08 | End: 2024-11-10 | Stop reason: HOSPADM

## 2024-11-07 RX ORDER — OXYCODONE HYDROCHLORIDE 5 MG/1
5 TABLET ORAL EVERY 4 HOURS PRN
Status: DISCONTINUED | OUTPATIENT
Start: 2024-11-07 | End: 2024-11-07

## 2024-11-07 RX ORDER — OXYCODONE HYDROCHLORIDE 10 MG/1
10 TABLET ORAL EVERY 4 HOURS PRN
Status: DISCONTINUED | OUTPATIENT
Start: 2024-11-07 | End: 2024-11-07

## 2024-11-07 RX ORDER — SODIUM CHLORIDE, SODIUM LACTATE, POTASSIUM CHLORIDE, CALCIUM CHLORIDE 600; 310; 30; 20 MG/100ML; MG/100ML; MG/100ML; MG/100ML
125 INJECTION, SOLUTION INTRAVENOUS CONTINUOUS
Status: DISCONTINUED | OUTPATIENT
Start: 2024-11-07 | End: 2024-11-07

## 2024-11-07 RX ORDER — ONDANSETRON 2 MG/ML
4 INJECTION INTRAMUSCULAR; INTRAVENOUS EVERY 8 HOURS PRN
Status: DISCONTINUED | OUTPATIENT
Start: 2024-11-07 | End: 2024-11-10 | Stop reason: HOSPADM

## 2024-11-07 RX ORDER — ENOXAPARIN SODIUM 100 MG/ML
40 INJECTION SUBCUTANEOUS DAILY
Status: DISCONTINUED | OUTPATIENT
Start: 2024-11-08 | End: 2024-11-10 | Stop reason: HOSPADM

## 2024-11-07 RX ORDER — NALOXONE HYDROCHLORIDE 0.4 MG/ML
0.1 INJECTION, SOLUTION INTRAMUSCULAR; INTRAVENOUS; SUBCUTANEOUS
Status: ACTIVE | OUTPATIENT
Start: 2024-11-07 | End: 2024-11-08

## 2024-11-07 RX ORDER — ACETAMINOPHEN 325 MG/1
975 TABLET ORAL EVERY 6 HOURS SCHEDULED
Status: DISCONTINUED | OUTPATIENT
Start: 2024-11-07 | End: 2024-11-07

## 2024-11-07 RX ORDER — MORPHINE SULFATE 0.5 MG/ML
INJECTION, SOLUTION EPIDURAL; INTRATHECAL; INTRAVENOUS AS NEEDED
Status: DISCONTINUED | OUTPATIENT
Start: 2024-11-07 | End: 2024-11-07

## 2024-11-07 RX ORDER — SIMETHICONE 80 MG
80 TABLET,CHEWABLE ORAL 4 TIMES DAILY PRN
Status: DISCONTINUED | OUTPATIENT
Start: 2024-11-07 | End: 2024-11-10 | Stop reason: HOSPADM

## 2024-11-07 RX ORDER — ACETAMINOPHEN 10 MG/ML
1000 INJECTION, SOLUTION INTRAVENOUS ONCE
Status: COMPLETED | OUTPATIENT
Start: 2024-11-07 | End: 2024-11-07

## 2024-11-07 RX ORDER — BUPIVACAINE HYDROCHLORIDE 7.5 MG/ML
INJECTION, SOLUTION INTRASPINAL AS NEEDED
Status: DISCONTINUED | OUTPATIENT
Start: 2024-11-07 | End: 2024-11-07

## 2024-11-07 RX ORDER — SENNOSIDES 8.6 MG
1 TABLET ORAL DAILY
Status: DISCONTINUED | OUTPATIENT
Start: 2024-11-07 | End: 2024-11-10 | Stop reason: HOSPADM

## 2024-11-07 RX ORDER — KETOROLAC TROMETHAMINE 30 MG/ML
30 INJECTION, SOLUTION INTRAMUSCULAR; INTRAVENOUS EVERY 6 HOURS
Status: DISCONTINUED | OUTPATIENT
Start: 2024-11-07 | End: 2024-11-07

## 2024-11-07 RX ORDER — ACETAMINOPHEN 325 MG/1
650 TABLET ORAL EVERY 6 HOURS SCHEDULED
Status: DISCONTINUED | OUTPATIENT
Start: 2024-11-07 | End: 2024-11-08

## 2024-11-07 RX ORDER — ONDANSETRON 2 MG/ML
4 INJECTION INTRAMUSCULAR; INTRAVENOUS EVERY 8 HOURS PRN
Status: DISCONTINUED | OUTPATIENT
Start: 2024-11-07 | End: 2024-11-07

## 2024-11-07 RX ORDER — CEFAZOLIN SODIUM 2 G/50ML
2000 SOLUTION INTRAVENOUS ONCE
Status: DISCONTINUED | OUTPATIENT
Start: 2024-11-07 | End: 2024-11-07

## 2024-11-07 RX ORDER — KETOROLAC TROMETHAMINE 30 MG/ML
INJECTION, SOLUTION INTRAMUSCULAR; INTRAVENOUS AS NEEDED
Status: DISCONTINUED | OUTPATIENT
Start: 2024-11-07 | End: 2024-11-07

## 2024-11-07 RX ORDER — OXYTOCIN/RINGER'S LACTATE 30/500 ML
PLASTIC BAG, INJECTION (ML) INTRAVENOUS CONTINUOUS PRN
Status: DISCONTINUED | OUTPATIENT
Start: 2024-11-07 | End: 2024-11-07

## 2024-11-07 RX ORDER — OXYTOCIN/RINGER'S LACTATE 30/500 ML
62.5 PLASTIC BAG, INJECTION (ML) INTRAVENOUS ONCE
Status: COMPLETED | OUTPATIENT
Start: 2024-11-07 | End: 2024-11-07

## 2024-11-07 RX ORDER — KETOROLAC TROMETHAMINE 30 MG/ML
30 INJECTION, SOLUTION INTRAMUSCULAR; INTRAVENOUS EVERY 6 HOURS SCHEDULED
Status: COMPLETED | OUTPATIENT
Start: 2024-11-07 | End: 2024-11-08

## 2024-11-07 RX ORDER — EPHEDRINE SULFATE 50 MG/ML
INJECTION INTRAVENOUS AS NEEDED
Status: DISCONTINUED | OUTPATIENT
Start: 2024-11-07 | End: 2024-11-07

## 2024-11-07 RX ORDER — CEFAZOLIN SODIUM 2 G/50ML
2000 SOLUTION INTRAVENOUS ONCE
Status: COMPLETED | OUTPATIENT
Start: 2024-11-07 | End: 2024-11-07

## 2024-11-07 RX ORDER — DIPHENHYDRAMINE HYDROCHLORIDE 50 MG/ML
INJECTION INTRAMUSCULAR; INTRAVENOUS AS NEEDED
Status: DISCONTINUED | OUTPATIENT
Start: 2024-11-07 | End: 2024-11-07

## 2024-11-07 RX ORDER — IBUPROFEN 600 MG/1
600 TABLET, FILM COATED ORAL EVERY 6 HOURS
Status: DISCONTINUED | OUTPATIENT
Start: 2024-11-09 | End: 2024-11-10 | Stop reason: HOSPADM

## 2024-11-07 RX ORDER — SODIUM CHLORIDE, SODIUM LACTATE, POTASSIUM CHLORIDE, CALCIUM CHLORIDE 600; 310; 30; 20 MG/100ML; MG/100ML; MG/100ML; MG/100ML
125 INJECTION, SOLUTION INTRAVENOUS CONTINUOUS
Status: DISCONTINUED | OUTPATIENT
Start: 2024-11-07 | End: 2024-11-10 | Stop reason: HOSPADM

## 2024-11-07 RX ORDER — OXYCODONE HYDROCHLORIDE 10 MG/1
10 TABLET ORAL EVERY 4 HOURS PRN
Status: DISCONTINUED | OUTPATIENT
Start: 2024-11-08 | End: 2024-11-10 | Stop reason: HOSPADM

## 2024-11-07 RX ORDER — HYDROMORPHONE HCL/PF 1 MG/ML
0.5 SYRINGE (ML) INJECTION
Status: DISCONTINUED | OUTPATIENT
Start: 2024-11-07 | End: 2024-11-10 | Stop reason: HOSPADM

## 2024-11-07 RX ORDER — GLYCOPYRROLATE 0.2 MG/ML
INJECTION INTRAMUSCULAR; INTRAVENOUS AS NEEDED
Status: DISCONTINUED | OUTPATIENT
Start: 2024-11-07 | End: 2024-11-07

## 2024-11-07 RX ORDER — DEXAMETHASONE SODIUM PHOSPHATE 10 MG/ML
INJECTION, SOLUTION INTRAMUSCULAR; INTRAVENOUS AS NEEDED
Status: DISCONTINUED | OUTPATIENT
Start: 2024-11-07 | End: 2024-11-07

## 2024-11-07 RX ORDER — FENTANYL CITRATE 50 UG/ML
INJECTION, SOLUTION INTRAMUSCULAR; INTRAVENOUS AS NEEDED
Status: DISCONTINUED | OUTPATIENT
Start: 2024-11-07 | End: 2024-11-07

## 2024-11-07 RX ADMIN — BUPIVACAINE HYDROCHLORIDE IN DEXTROSE 1.5 ML: 7.5 INJECTION, SOLUTION SUBARACHNOID at 09:05

## 2024-11-07 RX ADMIN — FENTANYL CITRATE 15 MCG: 50 INJECTION INTRAMUSCULAR; INTRAVENOUS at 09:05

## 2024-11-07 RX ADMIN — KETOROLAC TROMETHAMINE 30 MG: 30 INJECTION, SOLUTION INTRAMUSCULAR; INTRAVENOUS at 10:00

## 2024-11-07 RX ADMIN — DEXAMETHASONE SODIUM PHOSPHATE 10 MG: 10 INJECTION, SOLUTION INTRAMUSCULAR; INTRAVENOUS at 09:17

## 2024-11-07 RX ADMIN — EPHEDRINE SULFATE 5 MG: 50 INJECTION INTRAVENOUS at 10:05

## 2024-11-07 RX ADMIN — OXYTOCIN 62.5 MILLI-UNITS/MIN: 10 INJECTION INTRAVENOUS at 10:57

## 2024-11-07 RX ADMIN — ACETAMINOPHEN 1000 MG: 10 INJECTION INTRAVENOUS at 10:02

## 2024-11-07 RX ADMIN — ACETAMINOPHEN 650 MG: 325 TABLET ORAL at 19:39

## 2024-11-07 RX ADMIN — Medication 999 MILLI-UNITS/MIN: at 09:28

## 2024-11-07 RX ADMIN — SODIUM CHLORIDE, SODIUM LACTATE, POTASSIUM CHLORIDE, AND CALCIUM CHLORIDE: .6; .31; .03; .02 INJECTION, SOLUTION INTRAVENOUS at 09:46

## 2024-11-07 RX ADMIN — KETOROLAC TROMETHAMINE 30 MG: 30 INJECTION, SOLUTION INTRAMUSCULAR; INTRAVENOUS at 15:57

## 2024-11-07 RX ADMIN — CEFAZOLIN SODIUM 2000 MG: 2 SOLUTION INTRAVENOUS at 09:06

## 2024-11-07 RX ADMIN — EPHEDRINE SULFATE 5 MG: 50 INJECTION INTRAVENOUS at 09:12

## 2024-11-07 RX ADMIN — SODIUM CHLORIDE, SODIUM LACTATE, POTASSIUM CHLORIDE, AND CALCIUM CHLORIDE: .6; .31; .03; .02 INJECTION, SOLUTION INTRAVENOUS at 09:01

## 2024-11-07 RX ADMIN — ONDANSETRON 4 MG: 2 INJECTION INTRAMUSCULAR; INTRAVENOUS at 09:56

## 2024-11-07 RX ADMIN — SODIUM CHLORIDE, SODIUM LACTATE, POTASSIUM CHLORIDE, AND CALCIUM CHLORIDE 1000 ML: .6; .31; .03; .02 INJECTION, SOLUTION INTRAVENOUS at 08:36

## 2024-11-07 RX ADMIN — EPHEDRINE SULFATE 5 MG: 50 INJECTION INTRAVENOUS at 09:29

## 2024-11-07 RX ADMIN — PHENYLEPHRINE HYDROCHLORIDE 40 MCG/MIN: 50 INJECTION INTRAVENOUS at 09:07

## 2024-11-07 RX ADMIN — GLYCOPYRROLATE 0.2 MCG: 0.2 INJECTION INTRAMUSCULAR; INTRAVENOUS at 09:03

## 2024-11-07 RX ADMIN — DIPHENHYDRAMINE HYDROCHLORIDE 12.5 MG: 50 INJECTION, SOLUTION INTRAMUSCULAR; INTRAVENOUS at 09:40

## 2024-11-07 RX ADMIN — Medication 500 MG: at 09:10

## 2024-11-07 RX ADMIN — MORPHINE SULFATE 0.15 MG: 0.5 INJECTION, SOLUTION EPIDURAL; INTRATHECAL; INTRAVENOUS at 09:05

## 2024-11-07 RX ADMIN — ONDANSETRON 4 MG: 2 INJECTION INTRAMUSCULAR; INTRAVENOUS at 09:03

## 2024-11-07 NOTE — PLAN OF CARE
Problem: POSTPARTUM  Goal: Experiences normal postpartum course  Description: INTERVENTIONS:  - Monitor maternal vital signs  - Assess uterine involution and lochia  Outcome: Progressing  Goal: Appropriate maternal -  bonding  Description: INTERVENTIONS:  - Identify family support  - Assess for appropriate maternal/infant bonding   -Encourage maternal/infant bonding opportunities  - Referral to  or  as needed  Outcome: Progressing  Goal: Establishment of infant feeding pattern  Description: INTERVENTIONS:  - Assess breast/bottle feeding  - Refer to lactation as needed  Outcome: Progressing  Goal: Incision(s), wounds(s) or drain site(s) healing without S/S of infection  Description: INTERVENTIONS  - Assess and document dressing, incision, wound bed, drain sites and surrounding tissue  - Provide patient and family education    Outcome: Progressing     Problem: PAIN - ADULT  Goal: Verbalizes/displays adequate comfort level or baseline comfort level  Description: Interventions:  - Encourage patient to monitor pain and request assistance  - Assess pain using appropriate pain scale  - Administer analgesics based on type and severity of pain and evaluate response  - Implement non-pharmacological measures as appropriate and evaluate response  - Consider cultural and social influences on pain and pain management  - Notify physician/advanced practitioner if interventions unsuccessful or patient reports new pain  Outcome: Progressing     Problem: INFECTION - ADULT  Goal: Absence or prevention of progression during hospitalization  Description: INTERVENTIONS:  - Assess and monitor for signs and symptoms of infection  - Monitor lab/diagnostic results  - Monitor all insertion sites, i.e. indwelling lines, tubes, and drains  - Monitor endotracheal if appropriate and nasal secretions for changes in amount and color  - [place] appropriate cooling/warming therapies per order  - Administer  medications as ordered  - Instruct and encourage patient and family to use good hand hygiene technique  - Identify and instruct in appropriate isolation precautions for identified infection/condition  Outcome: Progressing     Problem: SAFETY ADULT  Goal: Patient will remain free of falls  Description: INTERVENTIONS:  - Educate patient/family on patient safety including physical limitations  - Instruct patient to call for assistance with activity   - Keep Call bell within reach  - Keep bed low and locked with side rails adjusted as appropriate  - Keep care items and personal belongings within reach  - Initiate and maintain comfort rounds  - Make Fall Risk Sign visible to staff  Outcome: Progressing  Goal: Maintain or return to baseline ADL function  Description: INTERVENTIONS:  -  Assess patient's ability to carry out ADLs; assess patient's baseline for ADL function and identify physical deficits which impact ability to perform ADLs (bathing, care of mouth/teeth, toileting, grooming, dressing, etc.)  - Assess/evaluate cause of self-care deficits   - Assess range of motion  - Assess patient's mobility; develop plan if impaired  - Assess patient's need for assistive devices and provide as appropriate  - Encourage maximum independence but intervene and supervise when necessary  - Involve family in performance of ADLs  - Assess for home care needs following discharge   - Consider OT consult to assist with ADL evaluation and planning for discharge  - Provide patient education as appropriate  Outcome: Progressing  Goal: Maintains/Returns to pre admission functional level  Description: INTERVENTIONS:  - Set and communicate daily mobility goal to care team and patient/family/caregiver.   - Collaborate with rehabilitation services on mobility goals if consulted  Outcome: Progressing     Problem: Knowledge Deficit  Goal: Patient/family/caregiver demonstrates understanding of disease process, treatment plan, medications, and  discharge instructions  Description: Complete learning assessment and assess knowledge base.  Interventions:  - Provide teaching at level of understanding  - Provide teaching via preferred learning methods  Outcome: Progressing     Problem: DISCHARGE PLANNING  Goal: Discharge to home or other facility with appropriate resources  Description: INTERVENTIONS:  - Identify barriers to discharge w/patient and caregiver  - Arrange for needed discharge resources and transportation as appropriate  - Identify discharge learning needs (meds, wound care, etc.)  - Arrange for interpretive services to assist at discharge as needed  - Refer to Case Management Department for coordinating discharge planning if the patient needs post-hospital services based on physician/advanced practitioner order or complex needs related to functional status, cognitive ability, or social support system  Outcome: Progressing

## 2024-11-07 NOTE — ANESTHESIA POSTPROCEDURE EVALUATION
Post-Op Assessment Note    CV Status:  Stable    Pain management: satisfactory to patient       Mental Status:  Alert and awake   Hydration Status:  Stable   PONV Controlled:  None   Airway Patency:  Patent     Post Op Vitals Reviewed: Yes    No anethesia notable event occurred.    Staff: Anesthesiologist           Last Filed PACU Vitals:  Vitals Value Taken Time   Temp     Pulse 65 11/07/24 1122   /54 11/07/24 1115   Resp     SpO2 99 % 11/07/24 1122   Vitals shown include unfiled device data.    Modified Dilia:  Activity: 1 (11/7/2024 10:45 AM)  Respiration: 2 (11/7/2024 10:45 AM)  Circulation: 2 (11/7/2024 10:45 AM)  Consciousness: 2 (11/7/2024 10:45 AM)  Oxygen Saturation: 2 (11/7/2024 10:45 AM)  Modified Dilia Score: 9 (11/7/2024 10:45 AM)

## 2024-11-07 NOTE — PROGRESS NOTES
"Post Op Check - OB/GYN  eBtte Pimentel 29 y.o. female MRN: [id number]  Unit/Bed#:  313-01 Encounter: [id number]      Subjective:  Bette Pimentel is doing well post-operatively from a  delivery. She denies any pain. She is tolerating PO without nausea or vomiting. Simons is in place. She denies chest pain, SOB, or leg pain. Baby is in the room.     Vitals:   BP 99/54 (BP Location: Right arm)   Pulse 88   Temp 98 °F (36.7 °C) (Oral)   Resp 18   Ht 5' 3\" (1.6 m)   Wt 88.9 kg (196 lb)   LMP 2024   SpO2 97%   Breastfeeding Unknown   BMI 34.72 kg/m²     Physical Exam:   GEN: Bette Pimentel appears well, alert and oriented x 3, pleasant and cooperative   ABDOMEN: soft, no tenderness, no distention, fundus @ -2, Incision C/D/I  EXTREMITIES: SCDs on        Jonna Hammond MD  2024  3:47 PM         "

## 2024-11-07 NOTE — TELEPHONE ENCOUNTER
"Regardin weeks / labor/ contractions  ----- Message from Latasha PIERSON sent at 2024  7:16 AM EST -----  \"My wife's water broke and she is having contractions every five minutes. She is 38 weeks.\"    "

## 2024-11-07 NOTE — ANESTHESIA PROCEDURE NOTES
Spinal Block    Patient location during procedure: OR  Start time: 11/7/2024 9:05 AM  Reason for block: procedure for pain and at surgeon's request  Staffing  Performed by: Carson Foreman CRNA  Authorized by: Tameka Dangelo MD    Preanesthetic Checklist  Completed: patient identified, IV checked, site marked, risks and benefits discussed, surgical consent, monitors and equipment checked, pre-op evaluation and timeout performed  Spinal Block  Patient position: sitting  Prep: ChloraPrep and site prepped and draped  Patient monitoring: frequent blood pressure checks, continuous pulse ox and heart rate  Approach: midline  Location: L3-4  Needle  Needle type: Pencan   Needle gauge: 24 G  Needle length: 4 in  Assessment  Sensory level: T4  Injection Assessment:  negative aspiration for heme, no paresthesia on injection and positive aspiration for clear CSF.  Post-procedure:  site cleaned

## 2024-11-07 NOTE — ASSESSMENT & PLAN NOTE
- QBL: 170 mL, Hgb 11.5 --> AM CBC  - Pain well controlled with oral analgesics  - Simons catheter removed at time 11/7 @2130, f/u void trial, one more void to pass  - Tolerating PO fluids and solids  - Ambulating without difficulty  - DVT ppx with Lovenox 40mg, SCDs  - Contraception: delines  - Breastfeeding  - Anticipate discharge POD#2-4

## 2024-11-07 NOTE — LACTATION NOTE
This note was copied from a baby's chart.  CONSULT - LACTATION  Baby Boy (Michelle Pimentel 0 days male MRN: [id number]    [facility] Room / Bed: (N)/(N) Encounter: [id number]    Maternal Information     MOTHER:  Bette Pimentel  Maternal Age: 29 y.o.  OB History: # 1 - Date: 14, Sex: Male, Weight: 3515 g (7 lb 12 oz), GA: 39w0d, Type: , Unspecified, Apgar1: None, Apgar5: None, Living: Living, Birth Comments: None    # 2 - Date: 22, Sex: Male, Weight: 3885 g (8 lb 9 oz), GA: 39w1d, Type: , Low Transverse, Apgar1: 8, Apgar5: 9, Living: Living, Birth Comments: None    # 3 - Date: 24, Sex: Male, Weight: 3545 g (7 lb 13 oz), GA: 38w4d, Type: , Low Transverse, Apgar1: 9, Apgar5: 9, Living: Living, Birth Comments: None   Previouse breast reduction surgery? No    Lactation history:   Has patient previously breast fed: Yes   How long had patient previously breast fed: 5 months with 1st, 1 year with second   Previous breast feeding complications: Breast/nipple pain (Second baby had tongue tie)     Past Surgical History:   Procedure Laterality Date     SECTION      [place]  DELIVERY ONLY N/A 2022    Procedure:  SECTION () REPEAT;  Surgeon: Carmel Dumont MD;  Location: [facility] ;  Service: Obstetrics    TONSILLECTOMY         Birth information:  YOB: 2025   Time of birth: 9:27 AM   Sex: male   Delivery type: , Low Transverse   Birth Weight: 3545 g (7 lb 13 oz)   Percent of Weight Change: 0%     Gestational Age: 38w4d   [unfilled]    Assessment     Breast and nipple assessment: normal assessment    [place] Assessment: sleepy, requires stimulation at the breast    Feeding assessment: feeding well, once awake and stimulated  LATCH:  Latch: Grasps breast, tongue down, lips flanged, rhythmic sucking   Audible Swallowing: A few with stimulation   Type of Nipple: Everted  (After stimulation)   Comfort (Breast/Nipple): Soft/non-tender   Hold (Positioning): Partial assist, teach one side, mother does other, staff holds   LATCH Score: 8          Feeding recommendations:  breast feed on demand  every 2-3 hours, watching for early feeding cues. At least 8-12 feedings in 24 hours.       11/07/24 2180   Lactation Consultation   Reason for Consult 20;20 min;Other (Comment)   Other Time Spent 30 minutes   Lactation Consultant Total Time 70   Maternal Information   Has mother  before? Yes   How long did the the mother previously breastfeed? 5 months with 1st, 1 year with second   Previous Maternal Breastfeeding Challenges Breast/nipple pain  (Second baby had tongue tie)   Infant to breast within first hour of birth? No   Breastfeeding Delayed Due to   (No latch achieved)   Exclusive Pump and Bottle Feed No   LATCH Documentation   Latch 2   Audible Swallowing 1   Type of Nipple 2   Comfort (Breast/Nipple) 2   Hold (Positioning) 1   LATCH Score 8   Having latch problems?   (Sleepy)   Position(s) Used Cross Cradle;Football   Breasts/Nipples   Left Breast Soft   Right Breast Soft   Left Nipple Everted   Right Nipple Everted   Intervention Hand expression   Breastfeeding Progress Not yet established;Breastfeeding well   Patient Follow-Up   Lactation Consult Status 2   Follow-Up Type Inpatient;Call as needed   Other OB Lactation Documentation    Additional Problem Noted Experienced mother, baby sleepy, assisted with initial latch using hand expression, different positions and waking techniques.  Baby  on right breast using cross cradle for 30 minutes.  (Reviewed RSB and DC book.)     Patient is an experienced mother and breast fed her previous children between 5 months to 1 year. Patient demonstrated good technique in latching and positioning and required assistance in waking baby up for feed.     Baby was having difficulty with initial latch. Performed hand expression, and mother  also demonstrated hand expression to feed baby drops of colostrum in attempt to wake baby up. Baby latched on right breast for 30 minutes. Active jaw movement, sucking and audible swallows noted. Mother reports strong pull and tug sensation when the baby was sucking.     Discussed different waking techniques to help a sleepy baby - checking baby's diaper, sitting baby upright and burping them. Placing baby skin to skin prior to feedings. Stimulating baby's fingers, toes, ears, and chin while they are feeding on the breast to keep them actively sucking. Switching breasts and different positions may also help. Hand expression before latching can help the baby smell and taste the colostrum.     Discussed that initial latch can be painful at first but should wear off and should not be continuous throughout the feeding. Discussed that mother should feel a strong pull and tug and not feeling any pinching. Discussed that it feels like baby is pinching at the breast, encourage to break the suction by putting pinky in the corner of the baby's mouth and relatching the baby.    Patient is encouraged to continue to breastfeed on demand: when baby shows hunger cues or least every 2-3 hours, not waiting over 3 hours to feed. Reviewed that baby should breastfeed at least 8-12 times in 24 hours and watch for early hunger cues. Encouraged patient to offer both breasts during a feed, multiple times. Discussed not limiting baby's time at the breast, as long as baby is actively sucking.    Discussed how to know when the baby is getting full at the breast. Sucking starts to slow down; goes from rhythmic sucking to more flutter sucking. Baby starts to face away from the breast and lets go of the nipple after actively sucking and swallowing with taking short breaks at the breast for at least 10 minutes. Baby falls asleep after the feed. Baby is content and arms are relaxed.      Discussed cluster feeding and need to feed on demand to  promote supply and prevent excessive weight loss. Reviewed cluster feeding is a normal baby behavior and helps bring in a good milk supply.     Discussed how to know the baby is feeding adequately at the breast:  -Baby is deeply latched onto the breast, with lips flanged out. Actively sucking, swallows may not be audible.  -Mother feels a comfortable tugging sensation during a feeding.  -Baby is showing fullness cues- content, arms relaxed and/or sleeping.  -Baby is having adequate amounts of diapers and meeting goal diapers.  -Baby's stool is changing from black meconium, to greenish brown to yellow and seedy looking over the first week when exclusively providing breast milk.   -Baby's weight loss is within normal ranges.     Patient was encouraged to continue to document baby's feedings and outputs to ensure baby is adequately feeding at the breast.     Discussed community resources for continued breastfeeding support once discharged home. Encouraged to contact with [facility].    Patient was encouraged to contact lactation for a latch assessment, continued support and/or questions that arise.     Aidee Krause 11/7/2024 6:14 PM

## 2024-11-07 NOTE — TELEPHONE ENCOUNTER
"Reason for Disposition  • [1] History of prior delivery (multipara) AND [2] contractions < 10 minutes apart AND [3] present 1 hour    Answer Assessment - Initial Assessment Questions  2. CONTRACTIONS: \"Describe the contractions that you are having.\" (e.g., duration, frequency, regularity, severity)      5  minutes apart     3. PREGNANCY: \"How many weeks pregnant are you?\"      38w4d     4. KOLTON: \"What date are you expecting to deliver?\"      24    5. PARITY: \"Have you had a baby before?\" If Yes, ask: \"How long did the labor last?\"           states they are 5 minutes away from the hospital.  Jose OB charge RN and on call physician notified.    Protocols used: Pregnancy - Labor-Adult-    "

## 2024-11-07 NOTE — H&P
H & P- Obstetrics   Bette Pimentel 29 y.o. female MRN: [id number]  Unit/Bed#: LD TRIAGE 1- Encounter: [id number]      Assessment/Plan:    Bette is a 29 y.o.  at 38w4d admitted for labor and spontaneous rupture of membranes. Plan for a RLTCS.     SVE: Cervical Dilation: 4  Cervical Effacement: 90  Cervical Consistency: Soft  Fetal Station: -2  Presentation: Vertex  Method: Manual  OB Examiner: Stephany    * 38 weeks gestation of pregnancy  Assessment & Plan  Anesthesia consult for regional anesthesia  Ancef 2g IV and Azithromycin 500mg IV for surgical prophylaxis  FU CBC, Syphilis screening, Blood Type & Screen  Contraception: declines, is not interested in permanent sterilization    Previous  delivery, antepartum  Assessment & Plan  Plan for RLTCS    Anemia during pregnancy in second trimester  Assessment & Plan  Has been taking iron supplementation  Follow up admission CBC    Patient of: [facility]  This patient will be an INPATIENT  and I certify the anticipated length of stay is >2 Midnights  Discussed with Dr. Dumont      SUBJECTIVE:    Chief Complaint: contractions and leaking fluid     HPI: Bette Pimentel is a 29 y.o.  with an KOLTON of 2024, by Embryo Transfer at 38w4d who is being admitted for RLTCS in setting of labor  and spontaneous rupture of membranes. She complains of uterine contractions, occurring every 4 minutes, has large amounts of fluid leaking, and reports no VB. She states that the baby moves as usual.. This pregnancy is complicated by limited prenatal care, IVF pregnancy, and anemia. She denies chest pain or SOB. All other review of systems is negative.       Pregnancy Plan:  Pregnancy: Taylor  Support person: Talat     Delivery Plans  Planned delivery method:   Planned delivery location: [facility] L[facility]D  Acceptable blood products: All     Post-Delivery Plans  Feeding intentions: Breast Milk and Non-human milk substitute      Patient Active  Problem List   Diagnosis    Allergic rhinitis    Thyroid disorder    Thyroid nodule    History of BCG vaccination    Anemia during pregnancy in second trimester    HRP (high risk pregnancy)    Pregnancy conceived through in vitro fertilization    Previous  delivery, antepartum    17 weeks gestation of pregnancy    Uterine contractions       OB History    Para Term  AB Living   3 2 2     2   SAB IAB Ectopic Multiple Live Births         0 2      # Outcome Date GA Lbr Sebastian/2nd Weight Sex Type Anes PTL Lv   3 Current            2 Term 22 39w1d  3885 g (8 lb 9 oz) M CS-LTranv Spinal N EVELYNE   1 Term 14 39w0d  3515 g (7 lb 12 oz) M CS-Unspec EPI, Spinal  EVELYNE      Obstetric Comments   Menarche 12       Past Medical History:   Diagnosis Date    COVID-19 affecting pregnancy in third trimester 2022    Formatting of this note might be different from the original.  + test 22 (29w3d)     Last Assessment & Plan:   Formatting of this note is different from the original.  COVID-19 Positive: 22  GA at DX: 29 weeks  Initial S/S: Cough fever chills, fatigue, body aches  Current S/S: None  Medication/MAB: None  Vaccine Status: J&J when first came out, No other vaccine     Counseled that informa    Hypothyroidism     Varicella     had chicken pox       Past Surgical History:   Procedure Laterality Date     SECTION      [place]  DELIVERY ONLY N/A 2022    Procedure:  SECTION () REPEAT;  Surgeon: Carmel Dumont MD;  Location: [facility] ;  Service: Obstetrics    TONSILLECTOMY         Social History     Tobacco Use    Smoking status: Never    Smokeless tobacco: Never   Substance Use Topics    Alcohol use: Not Currently     Comment: none since pregnancy       No Known Allergies      Medications Prior to Admission:     Prenatal Vit-Fe Fumarate-FA (PRENATAL VITAMIN PO)        OBJECTIVE:  Vitals:  Temp:  [97.7 °F (36.5 °C)] 97.7 °F (36.5 °C)  HR:  [88] 88  BP:  (115)/(85) 115/85  Resp:  [16] 16  There is no height or weight on file to calculate BMI.     Physical Exam:  General: Well appearing, no distress  Respiratory: Unlabored breathing  Cardiovascular: Regular rate.  Abdomen: Soft, gravid, nontender  Fundal Height: Appropriate for gestational age.  Extremities: Warm and well perfused.  Non tender.  Psychiatric: Behavioral normal  Genitourinary: Normal external female genitalia, Pooling on speculum exam, positive nitrazine, SVE 4/90/-2        FHT:  Baseline 130bpm  Moderate variability  15x15 accelerations  No decelerations  FHT category I    TOCO:   Contractions q4 min      Prenatal Labs: I have personally reviewed pertinent reports.  , Blood Type:   Lab Results   Component Value Date/Time    ABO Grouping A 10/25/2024 12:57 PM     , D (Rh type):   Lab Results   Component Value Date/Time    Rh Factor Positive 10/25/2024 12:57 PM    Rh Type RH(D) POSITIVE 10/29/2022 08:23 AM     , Antibody Screen: Negative   , HCT/HGB:   Lab Results   Component Value Date/Time    Hematocrit 31.9 (L) 10/25/2024 12:57 PM    Hemoglobin 10.8 (L) 10/25/2024 12:57 PM      , MCV:   Lab Results   Component Value Date/Time    MCV 84 10/25/2024 12:57 PM      , Platelets:   Lab Results   Component Value Date/Time    Platelets 178 10/25/2024 12:57 PM   Declined glucola, fasting glucose wnl  , Varicella:   Lab Results   Component Value Date/Time    Varicella IgG NON-IMMUNE (A) 09/24/2024 10:42 AM    Varicella Zoster, IgG 309.90 10/29/2022 08:23 AM       , Rubella:   Lab Results   Component Value Date/Time    Rubella IgG Quant 128.1 09/24/2024 10:42 AM        , VDRL/RPR:   Lab Results   Component Value Date/Time    RPR Non-Reactive 11/24/2022 09:23 AM      , Urine Culture/Screen:   Lab Results   Component Value Date/Time    Urine Culture 30,000-39,000 cfu/ml 09/24/2024 10:42 AM       , Hep B:   Lab Results   Component Value Date/Time    Hepatitis B Surface Ag Non-reactive 09/24/2024 10:42 AM     , Hep  "C: Non-reactive   , HIV:   Lab Results   Component Value Date/Time    HIV AG/AB, 4th Gen NON-REACTIVE 10/29/2022 08:23 AM     , Chlamydia: Negative   , Gonorrhea:   Lab Results   Component Value Date/Time    N gonorrhoeae, DNA Probe Negative 10/16/2024 01:10 PM     , Group B Strep:    Lab Results   Component Value Date/Time    Strep Grp B PCR Negative 10/16/2024 01:10 PM            Jonna Hammond MD  11/7/2024  8:20 AM        Portions of the record may have been created with voice recognition software.  Occasional wrong word or \"sound a like\" substitutions may have occurred due to the inherent limitations of voice recognition software.  Read the chart carefully and recognize, using context, where substitutions have occurred     "

## 2024-11-07 NOTE — ANESTHESIA PREPROCEDURE EVALUATION
"Procedure:   SECTION () REPEAT (Uterus)    Relevant Problems   GYN   (+) 38 weeks gestation of pregnancy   (+) HRP (high risk pregnancy)   (+) Pregnancy conceived through in vitro fertilization      HEMATOLOGY   (+) Anemia during pregnancy in second trimester      NPO since 11 PM last night     Ruptured membranes and painfully samantha     Lab Results   Component Value Date    WBC 8.79 2024    HGB 11.0 (L) 2024    HCT 32.5 (L) 2024    MCV 83 2024     2024     Lab Results   Component Value Date    SODIUM 136 2024    K 3.6 2024     2024    CO2 25 2024    BUN 9 2024    CREATININE 0.40 (L) 2024    GLUC 90 2024    CALCIUM 8.9 2024     No results found for: \"INR\", \"PROTIME\"  Lab Results   Component Value Date    HGBA1C 5.5 2024          Physical Exam    Airway    Mallampati score: II  TM Distance: >3 FB  Neck ROM: full     Dental   No notable dental hx     Cardiovascular      Pulmonary      Other Findings  Gravid post-pubertal.      Anesthesia Plan  ASA Score- 2     Anesthesia Type- spinal with ASA Monitors.         Additional Monitors:     Airway Plan:            Plan Factors-    Chart reviewed.   Existing labs reviewed. Patient summary reviewed.    Patient is not a current smoker.              Induction-     Postoperative Plan-     Perioperative Resuscitation Plan - Level 1 - Full Code.       Informed Consent- Anesthetic plan and risks discussed with patient.  I personally reviewed this patient with the CRNA. Discussed and agreed on the Anesthesia Plan with the CRNA..        "

## 2024-11-07 NOTE — OP NOTE
OPERATIVE REPORT  PATIENT NAME: Bette Pimentel    :  1995  MRN: [id number]  Pt Location: AN L&D OR ROOM 02    SURGERY DATE: 2024    Surgeons and Role:     * Carmel Dumont MD - Primary    Preop Diagnosis:  Previous  delivery affecting pregnancy [O34.219]    Post-Op Diagnosis Codes:     * Previous  delivery affecting pregnancy [O34.219]    Procedure(s) (LRB):   SECTION () REPEAT (N/A)    Specimen(s):  ID Type Source Tests Collected by Time Destination   A :  Tissue (Placenta on Hold) OB Only Placenta PLACENTA IN STORAGE Carmel Dumont MD 2024    B :  Cord Blood Cord BLOOD GAS, VENOUS, CORD, BLOOD GAS, ARTERIAL, CORD Carmel Dumont MD 2024        Surgical QBL:  Surgical QBL (mL): 170 mL      Drains:  None    Anesthesia Type:   Spinal    Operative Indications:  Previous  delivery affecting pregnancy [O34.219]       Saurabh Group Classification System:  No Multiple pregnancy, No Transverse or oblique lie, No Breech lie, Gestational age is > or =37 weeks, Multiparous, Previous uterine scar +  is SAURABH GROUP 5    Operative Findings:  No adhesive disease  Gravid, normal appearing uterus  Viable male  delivered at 0927 with APGARS of 9 and 9 at 1 and 5 minutes respectively. Birth weight 7lbs 13oz  Intact placenta with eccentric insertion of 3 vessel umbilical cord  Normal appearing fallopian tubes and ovaries bilaterally  Umbilical Cord Arterial & Venous Blood Gases:  Umbilical Cord Venous Blood Gas:  Results from last 7 days   Lab Units 24  0929   PH COV  7.282   PCO2 COV mm HG 47.0*   HCO3 COV mmol/L 21.7   BASE EXC COV mmol/L -5.1*   O2 CT CD VB mL/dL 12.1   O2 HGB, VENOUS CORD % 62.1     Umbilical Cord Arterial Blood Gas:  Results from last 7 days   Lab Units 24  0929   PH COA  7.250   PCO2 COA  49.4   PO2 COA mm HG 19.0   HCO3 COA mmol/L 21.2   BASE EXC COA mmol/L -6.3*   O2 CONTENT CORD ART  ml/dl 7.4   O2 HGB, ARTERIAL CORD % 35.5         Complications:   None apparent    Procedure and Technique:  In the operating room the correct patient and procedures were identified. Spinal anesthesia was adequately established. Ancef and Azithromycin IV was given for preoperative surgical prophylaxis. Patient was placed in the dorsal supine position with a left tilt of the hips. SCDs were applied to the lower extremities. Fetal heart tones were confirmed to be category I. Chloraprep was used to prep the vagina and perineum. A fortune catheter was placed without difficulty. Chloraprep was used to prep the abdomen. She was draped in the usual sterile fashion. Time out was performed with all in agreement.     Pfannenstiel incision was made in the skin with a surgical scalpel and sharp dissection was carried out over subsequent layers of tissue down to the level of the fascia. The fascia was incised at the midline and the incision was extended bilaterally using the curved Phillips scissors. The superior edge of the fascial incision was grasped with Kocher clamps, tented up and the underlying rectus muscles were dissected off bluntly and sharply using the scalpel. The same was done inferiorly. The rectus muscles were then divided at midline and the peritoneum was identified, tented up at its upper margin taking care to avoid the bladder, and then entered. The peritoneal incision was extended superiorly and inferiorly. The bladder blade was inserted and the vesicouterine peritoneum was identified. A  transverse incision was made in the lower uterine segment using a new surgical blade. Clear amniotic fluid was noted. The uterine incision was extended cephalad and caudal using blunt dissection. The surgeon's hand was placed into the uterine cavity. The fetal head was identified and elevated through the uterine incision with the assistance of fundal pressure. There was a loose nuchal cord which was easily reduced. With gentle  traction, the shoulders were delivered followed by the rest of the fetal body. Following delayed cord clamping, the umbilical cord was doubly clamped and cut. The infant was then passed off the table to the awaiting  staff. The  was noted to cry spontaneously and moved all extremities. Blood gas, cord blood, and portion of cord were obtained for analysis and routine blood testing.  The placenta delivered with uterine massage and was noted to be intact with and had eccentric insertion of a three-vessel cord. The placenta was sent to storage. Oxytocin was administered by IV infusion to enhance uterine contraction. The uterus was exteriorized and cleared of all clots and remaining products of conception.      The hysterotomy was reapproximated using Monocryl in a running non-locked fashion.  A second imbricating stitch with Monocryl was applied. Hemostasis was achieved. The posterior cul-de-sac was cleared of all clots and products of conception. The uterus was replaced into the abdomen and the pericolic gutters were cleared of all clots. Hemostasis was once again confirmed at the hysterotomy. The fascia was reapproximated using Vicryl in a running nonlocked fashion. The subcutaneous tissue was irrigated and cleared of all clots and debris. Good hemostasis was noted. The subcutaneous tissue was reapproximated with plain gut suture.  The skin incision was closed in a running subcuticular fashion with Stratafix.  Good hemostasis was noted. Exofin was applied. The uterus was expressed of clots . Patient tolerated the procedure well.  All needle, sponge, and instrument counts were noted to be correct x 2 at the end of the procedure.  Patient was transferred to the recovery room in stable condition.      Dr. Dumont was present for the procedure.    Patient Disposition:  PACU         SIGNATURE: Jonna Hammond MD  DATE: 2024  TIME: 10:19 AM

## 2024-11-07 NOTE — DISCHARGE SUMMARY
Obstetrics Discharge Summary   Bette Pimentel 29 y.o. female MRN: [id number]  Unit/Bed#: LD PACU- Encounter: [id number]    Admission Date: 2024     Discharge Date: 24    Admitting Diagnoses:   38 weeks gestation  Anemia    Discharge Diagnoses:   Same as above, delivered    Procedures:   repeat  section, low transverse incision    Admitting Attending: Dr. Dumont  Delivery Attending: Dr. Dumont  Discharge Attending:     [place] [facility] Course:   Bette Pimentel is now a 29 y.o.  who was initially admitted at 38w4d for RLTCS in setting of ruptured membranes and labor.     She underwent an uncomplicated repeat low transverse  section delivery on 24 and delivered a viable male  at 0927. APGARS were 9, 9 at 1 and 5 minutes, respectively. 's birth weight was 7lb 13oz. Placenta delivered without difficulty.   was admitted to the  nursery. Patient tolerated the procedure well and was transferred to recovery in stable condition.     The patient's post partum course was unremarkable.. Her preoperative hemoglobin was 11 g/dL, postoperative was 9.5. Her postoperative pain was well controlled with oral analgesics.    On day of discharge, she was ambulating and able to reasonably perform all ADLs. She was voiding and had appropriate bowel function. Pain was well controlled. She was discharged home on post-operative day #2 without complications. Patient was instructed to follow up with her OB as an outpatient and was given appropriate warnings to call provider if she develops signs of infection or uncontrolled pain. She is breast feeding .  Mom's blood type is A positive. RhoGAM was not indicated.      Complications:   None    Condition at discharge:   good     Provisions for Follow-Up Care:  See after visit summary for information related to follow-up care and any pertinent home health orders.      Disposition:   Home    Planned Readmission:   No    Discharge  Medications:   Please see AVS for a complete list of discharge medications.    Discharge instructions :   Please see AVS for complete discharge instructions.

## 2024-11-08 LAB
ERYTHROCYTE [DISTWIDTH] IN BLOOD BY AUTOMATED COUNT: 14.1 % (ref 11.6–15.1)
HCT VFR BLD AUTO: 28.8 % (ref 34.8–46.1)
HGB BLD-MCNC: 9.5 G/DL (ref 11.5–15.4)
MCH RBC QN AUTO: 27.9 PG (ref 26.8–34.3)
MCHC RBC AUTO-ENTMCNC: 33 G/DL (ref 31.4–37.4)
MCV RBC AUTO: 85 FL (ref 82–98)
PLATELET # BLD AUTO: 174 THOUSANDS/UL (ref 149–390)
PMV BLD AUTO: 11.2 FL (ref 8.9–12.7)
RBC # BLD AUTO: 3.41 MILLION/UL (ref 3.81–5.12)
WBC # BLD AUTO: 13.57 THOUSAND/UL (ref 4.31–10.16)

## 2024-11-08 PROCEDURE — 85027 COMPLETE CBC AUTOMATED: CPT

## 2024-11-08 PROCEDURE — 99024 POSTOP FOLLOW-UP VISIT: CPT | Performed by: OBSTETRICS & GYNECOLOGY

## 2024-11-08 RX ORDER — ACETAMINOPHEN 325 MG/1
650 TABLET ORAL EVERY 6 HOURS SCHEDULED
Status: DISCONTINUED | OUTPATIENT
Start: 2024-11-08 | End: 2024-11-10 | Stop reason: HOSPADM

## 2024-11-08 RX ADMIN — IRON SUCROSE 200 MG: 20 INJECTION, SOLUTION INTRAVENOUS at 21:18

## 2024-11-08 RX ADMIN — KETOROLAC TROMETHAMINE 30 MG: 30 INJECTION, SOLUTION INTRAMUSCULAR; INTRAVENOUS at 23:51

## 2024-11-08 RX ADMIN — ACETAMINOPHEN 650 MG: 325 TABLET ORAL at 11:38

## 2024-11-08 RX ADMIN — KETOROLAC TROMETHAMINE 30 MG: 30 INJECTION, SOLUTION INTRAMUSCULAR; INTRAVENOUS at 00:23

## 2024-11-08 RX ADMIN — KETOROLAC TROMETHAMINE 30 MG: 30 INJECTION, SOLUTION INTRAMUSCULAR; INTRAVENOUS at 11:53

## 2024-11-08 RX ADMIN — NALBUPHINE HYDROCHLORIDE 5 MG: 10 INJECTION, SOLUTION INTRAMUSCULAR; INTRAVENOUS; SUBCUTANEOUS at 02:07

## 2024-11-08 RX ADMIN — ACETAMINOPHEN 650 MG: 325 TABLET, FILM COATED ORAL at 17:26

## 2024-11-08 RX ADMIN — SENNOSIDES 8.6 MG: 8.6 TABLET, FILM COATED ORAL at 11:39

## 2024-11-08 RX ADMIN — KETOROLAC TROMETHAMINE 30 MG: 30 INJECTION, SOLUTION INTRAMUSCULAR; INTRAVENOUS at 17:28

## 2024-11-08 RX ADMIN — KETOROLAC TROMETHAMINE 30 MG: 30 INJECTION, SOLUTION INTRAMUSCULAR; INTRAVENOUS at 06:34

## 2024-11-08 RX ADMIN — ACETAMINOPHEN 650 MG: 325 TABLET, FILM COATED ORAL at 23:52

## 2024-11-08 RX ADMIN — ACETAMINOPHEN 650 MG: 325 TABLET ORAL at 02:07

## 2024-11-08 RX ADMIN — ENOXAPARIN SODIUM 40 MG: 40 INJECTION SUBCUTANEOUS at 11:38

## 2024-11-08 RX ADMIN — OXYCODONE HYDROCHLORIDE 10 MG: 10 TABLET ORAL at 13:01

## 2024-11-08 RX ADMIN — OXYCODONE HYDROCHLORIDE 5 MG: 5 TABLET ORAL at 23:57

## 2024-11-08 NOTE — PLAN OF CARE
Problem: POSTPARTUM  Goal: Experiences normal postpartum course  Description: INTERVENTIONS:  - Monitor maternal vital signs  - Assess uterine involution and lochia  Outcome: Progressing  Goal: Appropriate maternal -  bonding  Description: INTERVENTIONS:  - Identify family support  - Assess for appropriate maternal/infant bonding   -Encourage maternal/infant bonding opportunities  - Referral to  or  as needed  Outcome: Progressing  Goal: Establishment of infant feeding pattern  Description: INTERVENTIONS:  - Assess breast/bottle feeding  - Refer to lactation as needed  Outcome: Progressing  Goal: Incision(s), wounds(s) or drain site(s) healing without S/S of infection  Description: INTERVENTIONS  - Assess and document dressing, incision, wound bed, drain sites and surrounding tissue  - Provide patient and family education    Outcome: Progressing     Problem: PAIN - ADULT  Goal: Verbalizes/displays adequate comfort level or baseline comfort level  Description: Interventions:  - Encourage patient to monitor pain and request assistance  - Assess pain using appropriate pain scale  - Administer analgesics based on type and severity of pain and evaluate response  - Implement non-pharmacological measures as appropriate and evaluate response  - Consider cultural and social influences on pain and pain management  - Notify physician/advanced practitioner if interventions unsuccessful or patient reports new pain  Outcome: Progressing     Problem: INFECTION - ADULT  Goal: Absence or prevention of progression during hospitalization  Description: INTERVENTIONS:  - Assess and monitor for signs and symptoms of infection  - Monitor lab/diagnostic results  - Monitor all insertion sites, i.e. indwelling lines, tubes, and drains  - Monitor endotracheal if appropriate and nasal secretions for changes in amount and color  - [place] appropriate cooling/warming therapies per order  - Administer  medications as ordered  - Instruct and encourage patient and family to use good hand hygiene technique  - Identify and instruct in appropriate isolation precautions for identified infection/condition  Outcome: Progressing     Problem: SAFETY ADULT  Goal: Patient will remain free of falls  Description: INTERVENTIONS:  - Educate patient/family on patient safety including physical limitations  - Instruct patient to call for assistance with activity   - Keep Call bell within reach  - Keep bed low and locked with side rails adjusted as appropriate  - Keep care items and personal belongings within reach  - Initiate and maintain comfort rounds  - Make Fall Risk Sign visible to staff  Outcome: Progressing  Goal: Maintain or return to baseline ADL function  Description: INTERVENTIONS:  -  Assess patient's ability to carry out ADLs; assess patient's baseline for ADL function and identify physical deficits which impact ability to perform ADLs (bathing, care of mouth/teeth, toileting, grooming, dressing, etc.)  - Assess/evaluate cause of self-care deficits   - Assess range of motion  - Assess patient's mobility; develop plan if impaired  - Assess patient's need for assistive devices and provide as appropriate  - Encourage maximum independence but intervene and supervise when necessary  - Involve family in performance of ADLs  - Assess for home care needs following discharge   - Consider OT consult to assist with ADL evaluation and planning for discharge  - Provide patient education as appropriate  Outcome: Progressing  Goal: Maintains/Returns to pre admission functional level  Description: INTERVENTIONS:  - Set and communicate daily mobility goal to care team and patient/family/caregiver.   - Collaborate with rehabilitation services on mobility goals if consulted  Outcome: Progressing     Problem: Knowledge Deficit  Goal: Patient/family/caregiver demonstrates understanding of disease process, treatment plan, medications, and  discharge instructions  Description: Complete learning assessment and assess knowledge base.  Interventions:  - Provide teaching at level of understanding  - Provide teaching via preferred learning methods  Outcome: Progressing     Problem: DISCHARGE PLANNING  Goal: Discharge to home or other facility with appropriate resources  Description: INTERVENTIONS:  - Identify barriers to discharge w/patient and caregiver  - Arrange for needed discharge resources and transportation as appropriate  - Identify discharge learning needs (meds, wound care, etc.)  - Arrange for interpretive services to assist at discharge as needed  - Refer to Case Management Department for coordinating discharge planning if the patient needs post-hospital services based on physician/advanced practitioner order or complex needs related to functional status, cognitive ability, or social support system  Outcome: Progressing

## 2024-11-08 NOTE — UTILIZATION REVIEW
"  Mother and baby discharged on 11/10/2024    NOTIFICATION OF INPATIENT ADMISSION   MATERNITY/DELIVERY AUTHORIZATION REQUEST   SERVICING FACILITY:   [facility] - L&D, , NICU  [address] [place] [address]  Tax ID: [id number]  NPI: [id number]   ATTENDING PROVIDER:  Attending Name and NPI#: Carmel Dumont Md [[id number]]  Address: [address] [place] [address]  Phone: [phone]   ADMISSION INFORMATION:  Place of Service: Inpatient [facility]  Place of Service Code: 21  Inpatient Admission Date/Time: 24  7:30 AM  Discharge Date/Time: 11/10/2024  2:04 PM  Admitting Diagnosis Code/Description:  Previous  delivery affecting pregnancy [O34.219]  39 weeks gestation of pregnancy [Z3A.39]  Encounter for  delivery without indication [O82]     Mother: Bette Pimentel 1995 Estimated Date of Delivery: 24  Delivering clinician: Carmel Dumont   OB History          3    Para   3    Term   3            AB        Living   3         SAB        IAB        Ectopic        Multiple   0    Live Births   3           Obstetric Comments   Menarche 12             [place] Name & MRN:   Information for the patient's :  Nirmal Hernandez Zheng [[id number]]   [place] Delivery Information:  Sex: male  Delivered 2024 9:27 AM by , Low Transverse; Gestational Age: 38w4d     Measurements:  Weight: 7 lb 13 oz (3545 g);  Height: 20.5\"    APGAR 1 minute 5 minutes 10 minutes   Totals: 9 9       UTILIZATION REVIEW CONTACT:  Lissette Cox, Utilization   Network Utilization Review Department  Phone: [phone]  Fax [phone]  Email: [email or web address]  Contact for approvals/pending authorizations, clinical reviews, and discharge.     PHYSICIAN ADVISORY SERVICES:  Medical Necessity Denial & Chrv-ct-Saqp Review  Phone: [phone]  Fax: [phone]  Email: " [email or web address]     DISCHARGE SUPPORT TEAM:  For Patients Discharge Needs & Updates  Phone: [phone] opt. 2 Fax: [phone]  Email: [email or web address]

## 2024-11-08 NOTE — CASE MANAGEMENT
Case Management Progress Note    Patient name Bette Pimentel  Location /-01 MRN [id number]  : 1995 Date 2024       LOS (days): 1  Geometric Mean LOS (GMLOS) (days):   Days to GMLOS:        OBJECTIVE:        Current admission status: Inpatient  Preferred Pharmacy:   [id number] - [place], [place] - [address] [address] [facility] [address]  Phone: [phone] Fax: [phone]    Primary Care Provider: Ena Peguero DO    Primary Insurance: BLUE CROSS  Secondary Insurance: [employer] [facility] [place]    PROGRESS NOTE:    CM received consult for MOB requesting Zomee for home use. Order placed to [employer] via [place]. Pump delivered to bedside by CM.

## 2024-11-08 NOTE — PROGRESS NOTES
"Progress Note - OB/GYN  Bette Pimentel 29 y.o. female MRN: [id number]  Unit/Bed#:  313-01 Encounter: [id number]    Assessment and Plan     Bette Pimentel is a patient of: [facility]. She is POD# 1 s/p  repeat  section, low transverse incision  Recovering well and is stable       * S/P repeat low transverse   Assessment & Plan  - QBL: 170 mL, Hgb 11.5 --> AM CBC  - Pain well controlled with oral analgesics  - Simons catheter removed at time  @2130, f/u void trial, one more void to pass  - Tolerating PO fluids and solids  - Ambulating without difficulty  - DVT ppx with Lovenox 40mg, SCDs  - Contraception: delines  - Breastfeeding  - Anticipate discharge POD#2-4          Disposition    - Anticipate discharge home on POD# 2-4      Subjective/Objective     Chief Complaint: Postpartum State     Subjective:    Bette Pimentel is POD#1 s/p  repeat  section, low transverse incision. She reports having to strain to  Pain is well controlled.  Patient is currently voiding.  She is ambulating.  Patient is currently passing flatus and has had no bowel movement. She is tolerating PO, and denies nausea or vomitting. Patient denies fever, chills, chest pain, shortness of breath, or calf tenderness. Lochia is normal. She is  Breastfeeding. She is recovering well and is stable.       Vitals:   BP 95/53 (BP Location: Left arm)   Pulse 77   Temp 98.1 °F (36.7 °C) (Oral)   Resp 18   Ht 5' 3\" (1.6 m)   Wt 88.9 kg (196 lb)   LMP 2024   SpO2 96%   Breastfeeding Yes   BMI 34.72 kg/m²       Intake/Output Summary (Last 24 hours) at 2024 0642  Last data filed at 2024 0215  Gross per 24 hour   Intake 1400 ml   Output 1520 ml   Net -120 ml       Invasive Devices       Peripheral Intravenous Line  Duration             Peripheral IV 24 Distal;Left;Ventral (anterior) Forearm <1 day                    Physical Exam:   GEN: Bette Pimentel appears well, alert and oriented x 3, " pleasant and cooperative   CARDIO: RRR, no murmurs or rubs  RESP:  CTAB, no wheezes or rales  ABDOMEN: soft, no tenderness, no distention, fundus @ umbilics, Incision C/D/I  EXTREMITIES: non tender, no erythema, b/l Jose F's sign negative      Labs:     Hemoglobin   Date Value Ref Range Status   11/08/2024 9.5 (L) 11.5 - 15.4 g/dL Final   11/07/2024 11.0 (L) 11.5 - 15.4 g/dL Final     WBC   Date Value Ref Range Status   11/08/2024 13.57 (H) 4.31 - 10.16 Thousand/uL Final   11/07/2024 8.79 4.31 - 10.16 Thousand/uL Final     Platelets   Date Value Ref Range Status   11/08/2024 174 149 - 390 Thousands/uL Final   11/07/2024 183 149 - 390 Thousands/uL Final     Creatinine   Date Value Ref Range Status   06/19/2024 0.40 (L) 0.60 - 1.30 mg/dL Final     Comment:     Standardized to IDMS reference method   03/31/2024 0.58 0.40 - 1.10 mg/dL Final   03/25/2024 0.53 0.40 - 1.10 mg/dL Final     AST   Date Value Ref Range Status   06/19/2024 13 13 - 39 U/L Final   03/31/2024 19 <41 U/L Final   03/25/2024 13 <41 U/L Final     ALT   Date Value Ref Range Status   06/19/2024 11 7 - 52 U/L Final     Comment:     Specimen collection should occur prior to Sulfasalazine administration due to the potential for falsely depressed results.    03/31/2024 17 <56 U/L Final   03/25/2024 11 <56 U/L Final          Gregor Rivers MD  11/8/2024  6:42 AM

## 2024-11-08 NOTE — PLAN OF CARE
Problem: POSTPARTUM  Goal: Experiences normal postpartum course  Description: INTERVENTIONS:  - Monitor maternal vital signs  - Assess uterine involution and lochia  Outcome: Progressing  Goal: Appropriate maternal -  bonding  Description: INTERVENTIONS:  - Identify family support  - Assess for appropriate maternal/infant bonding   -Encourage maternal/infant bonding opportunities  - Referral to  or  as needed  Outcome: Progressing  Goal: Establishment of infant feeding pattern  Description: INTERVENTIONS:  - Assess breast/bottle feeding  - Refer to lactation as needed  Outcome: Progressing  Goal: Incision(s), wounds(s) or drain site(s) healing without S/S of infection  Description: INTERVENTIONS  - Assess and document dressing, incision, wound bed, drain sites and surrounding tissue  - Provide patient and family education    Outcome: Progressing     Problem: PAIN - ADULT  Goal: Verbalizes/displays adequate comfort level or baseline comfort level  Description: Interventions:  - Encourage patient to monitor pain and request assistance  - Assess pain using appropriate pain scale  - Administer analgesics based on type and severity of pain and evaluate response  - Implement non-pharmacological measures as appropriate and evaluate response  - Consider cultural and social influences on pain and pain management  - Notify physician/advanced practitioner if interventions unsuccessful or patient reports new pain  Outcome: Progressing     Problem: INFECTION - ADULT  Goal: Absence or prevention of progression during hospitalization  Description: INTERVENTIONS:  - Assess and monitor for signs and symptoms of infection  - Monitor lab/diagnostic results  - Monitor all insertion sites, i.e. indwelling lines, tubes, and drains  - Monitor endotracheal if appropriate and nasal secretions for changes in amount and color  - [place] appropriate cooling/warming therapies per order  - Administer  medications as ordered  - Instruct and encourage patient and family to use good hand hygiene technique  - Identify and instruct in appropriate isolation precautions for identified infection/condition  Outcome: Progressing     Problem: SAFETY ADULT  Goal: Patient will remain free of falls  Description: INTERVENTIONS:  - Educate patient/family on patient safety including physical limitations  - Instruct patient to call for assistance with activity   - Keep Call bell within reach  - Keep bed low and locked with side rails adjusted as appropriate  - Keep care items and personal belongings within reach  - Initiate and maintain comfort rounds  - Make Fall Risk Sign visible to staff  Outcome: Progressing  Goal: Maintain or return to baseline ADL function  Description: INTERVENTIONS:  -  Assess patient's ability to carry out ADLs; assess patient's baseline for ADL function and identify physical deficits which impact ability to perform ADLs (bathing, care of mouth/teeth, toileting, grooming, dressing, etc.)  - Assess/evaluate cause of self-care deficits   - Assess range of motion  - Assess patient's mobility; develop plan if impaired  - Assess patient's need for assistive devices and provide as appropriate  - Encourage maximum independence but intervene and supervise when necessary  - Involve family in performance of ADLs  - Assess for home care needs following discharge   - Consider OT consult to assist with ADL evaluation and planning for discharge  - Provide patient education as appropriate  Outcome: Progressing  Goal: Maintains/Returns to pre admission functional level  Description: INTERVENTIONS:  - Set and communicate daily mobility goal to care team and patient/family/caregiver.   - Collaborate with rehabilitation services on mobility goals if consulted  Outcome: Progressing     Problem: Knowledge Deficit  Goal: Patient/family/caregiver demonstrates understanding of disease process, treatment plan, medications, and  discharge instructions  Description: Complete learning assessment and assess knowledge base.  Interventions:  - Provide teaching at level of understanding  - Provide teaching via preferred learning methods  Outcome: Progressing     Problem: DISCHARGE PLANNING  Goal: Discharge to home or other facility with appropriate resources  Description: INTERVENTIONS:  - Identify barriers to discharge w/patient and caregiver  - Arrange for needed discharge resources and transportation as appropriate  - Identify discharge learning needs (meds, wound care, etc.)  - Arrange for interpretive services to assist at discharge as needed  - Refer to Case Management Department for coordinating discharge planning if the patient needs post-hospital services based on physician/advanced practitioner order or complex needs related to functional status, cognitive ability, or social support system  Outcome: Progressing

## 2024-11-08 NOTE — LACTATION NOTE
This note was copied from a baby's chart.  Follow up Lactation: mom called out for help with latching. Mom states baby has not attempted a latch since 7 am. Mom states baby is latching ok on the right breast, but is not latching on the left. This is mom's 3rd baby and she is anxious about feeding.     Mom has latch assist and hand pump in the room. Mom states the hand pump is painful and the latch assist pulls out milk but does not keep the nipple stimulated.    Ed. On use of latch assist and hand pump - mom requires a 21 mm flange. Provided with lanolin and demonstration of use. Mom expressed 0.2 mls of colostrum with elongating the nipple.     Brought Gurmeet up to the left breast in a football hold.      Latch After Lactation Education/Consult:  Efficiency: football on the left breast              Lips Flanged: Yes, with alignment, snug hold              Depth of latch: deeper as baby begins to achieve alignment and a deeper latch              Audible Swallow: Yes              Visible Milk: Yes, with HE              Wide Open/ Asymmetrical: initially, small gape, as baby is allowed to demonstrate non-nutritive suck. After a few minutes, baby opens wider and a deep latch with wide, open gape is achieved. Active, coordination of sucking and audible swallowing noted.               Suck Swallow Cycle: Breathing: yes, Coordinated: yes  Nipple Assessment after latch: Normal: elongated/eraser, no discoloration and no damage noted.  Latch Problems: none with demonstration and teach back of alignment, nipple to nose, chin deep into the breast, long neck, snug hold of Gurmeet at the breast and  positioning.  Enc. To allow Gurmeet to remain latched on the left     Position After Lactation Education/Consult:  Infant's Ergonomics/Body: Football on the left breast               Body Alignment: Yes, with demonstration of ear, shoulder, hip alignment               Head Supported: Yes, enc. Snug hold and fingers to support  the opposite jaw               Close to Mom's body/ Lifted/ Supported: Yes, enc. Belly to belly and s2s               Mom's Ergonomics/Body: Yes, with more lower lumbar support                           Supported: Yes, pillows and a rolled blanket                           Sitting Back: Yes                           Brings Baby to her breast: Yes, enc. To bring baby to the breast and not tile Gurmeet's head towards the breast  Positioning Problems: Alignment, snug hold, and using hand pump with 21 mm flange to elongate the nipples    Feeding Plan:     Mom is encouraged to place baby skin to skin for feedings. Skin to skin education provided for baby placement on mother's chest, baby only in diaper, blankets below shoulders on baby's back. Skin to skin is encouraged to continue at home for feedings and between feedings.    Information on hand expression given. Discussed benefits of knowing how to manually express breast including stimulating milk supply, softening nipple for latch and evacuating breast in the event of engorgement.    Mom's nipple everts with stimulation. With nipple compression, short shank noted. Education on ways to elongate the nipple: Hand expression, Latch assist, breast shells, supple cups, manual and electric pump stimulation.     Education on positioning and alignment. Mom is encouraged to:     - Bring baby up to the breast (use of pillows to elevate so baby's torso is against mom's breasts)   - Skin to skin for feedings with top hand exposed to show signs of satiation   - Chin deep into breast tissue (make baby look up to the nipple)   - nose aligned to the nipple   -Wait for wide gape, drag chin on the breast so nipple is aimed at the upper, back palate  - Cheek should be touching breast   - Deep, firm hold of baby with ear, shoulder, hip alignment    Ed. On 3rd party distributors that offer smaller flanges on-line. ie) [employer]. Names of reputable companies like [employer] and [employer] offer  "smaller flanges for every double electric pump. [employer] will also provide a set of 12 mm to 19 mm flanges to fit most flanges.Enc. To try smaller flange and place a small amount of lanolin where the tunnel and funnel meet.     Demonstrated with teach back breast compressions during a feeding to increase milk transfer and stimulate suckling after a breathing/muscle break.     Nurse on demand: when baby gives hunger cues; when your breasts feel full, or at least every 3 hours during the day and every 5 hours at night counting from the beginning of one feeding to the beginning of the next; which ever comes first. When sucking and swallowing slow, gently compress the breast to restart flow. If active suck-swallow does not restart, gently remove the baby and offer the other breast; offering up to \"four\" breasts per feeding.      Feeding Plan    1. Meet early feeding cues  2. Use manual pump to elongate the nipple prior to the latch. May use lanolin inside flange. Use smaller 21 mm flanges  3. Use massage, warmth, hand expression to stimulate breasts  4. Use pillows to bring baby to the breast (shoulders back, lower back support)  5. Bring baby to breast skin to skin  6. Have baby's chest against mom's torso. Baby's chin should be deeply into the breast, and nose should touch the nipple. This position will  assist with deeper latch  7. Place opposite hand under the breast and grab the breast like a taco. Your thumb should be in front of the baby's nose and behind the areola. Move baby not breast, and bring baby to breast when mouth is wide and deep latch is achieved.  8. Use breast compressions to stimulate suck  9. Once baby unlatches, bring him up to your chest and practice burping techniques.   10. Move baby to the opposite breast and follow steps 1-8 to latch deeply.   11. May use hand pump to express any colostrum left in the breast.     Education on alternative feeding methods. Demonstration and teach back of " ( syringe). Baby took 0.2 mls of expressed colostrum. Encouraged to call lactation for additional assistance with feedings.

## 2024-11-09 PROCEDURE — 99024 POSTOP FOLLOW-UP VISIT: CPT | Performed by: OBSTETRICS & GYNECOLOGY

## 2024-11-09 RX ORDER — ACETAMINOPHEN 325 MG/1
650 TABLET ORAL EVERY 6 HOURS SCHEDULED
Qty: 14 TABLET | Refills: 0 | Status: SHIPPED | OUTPATIENT
Start: 2024-11-09 | End: 2024-11-11

## 2024-11-09 RX ORDER — DOCUSATE SODIUM 100 MG/1
100 CAPSULE, LIQUID FILLED ORAL 2 TIMES DAILY
Status: DISCONTINUED | OUTPATIENT
Start: 2024-11-09 | End: 2024-11-10 | Stop reason: HOSPADM

## 2024-11-09 RX ORDER — BENZOCAINE/MENTHOL 6 MG-10 MG
1 LOZENGE MUCOUS MEMBRANE DAILY PRN
Start: 2024-11-09

## 2024-11-09 RX ORDER — IBUPROFEN 600 MG/1
600 TABLET, FILM COATED ORAL EVERY 6 HOURS
Qty: 30 TABLET | Refills: 0 | Status: SHIPPED | OUTPATIENT
Start: 2024-11-09

## 2024-11-09 RX ADMIN — IBUPROFEN 600 MG: 600 TABLET, FILM COATED ORAL at 06:37

## 2024-11-09 RX ADMIN — ENOXAPARIN SODIUM 40 MG: 40 INJECTION SUBCUTANEOUS at 08:42

## 2024-11-09 RX ADMIN — ACETAMINOPHEN 650 MG: 325 TABLET, FILM COATED ORAL at 06:37

## 2024-11-09 RX ADMIN — IBUPROFEN 600 MG: 600 TABLET, FILM COATED ORAL at 22:57

## 2024-11-09 RX ADMIN — ACETAMINOPHEN 650 MG: 325 TABLET, FILM COATED ORAL at 12:47

## 2024-11-09 RX ADMIN — IBUPROFEN 600 MG: 600 TABLET, FILM COATED ORAL at 12:47

## 2024-11-09 RX ADMIN — OXYCODONE HYDROCHLORIDE 5 MG: 5 TABLET ORAL at 17:36

## 2024-11-09 RX ADMIN — DOCUSATE SODIUM 100 MG: 100 CAPSULE, LIQUID FILLED ORAL at 08:42

## 2024-11-09 RX ADMIN — OXYCODONE HYDROCHLORIDE 10 MG: 10 TABLET ORAL at 08:42

## 2024-11-09 RX ADMIN — IBUPROFEN 600 MG: 600 TABLET, FILM COATED ORAL at 17:35

## 2024-11-09 RX ADMIN — ACETAMINOPHEN 650 MG: 325 TABLET, FILM COATED ORAL at 17:35

## 2024-11-09 NOTE — PLAN OF CARE
Problem: POSTPARTUM  Goal: Experiences normal postpartum course  Description: INTERVENTIONS:  - Monitor maternal vital signs  - Assess uterine involution and lochia  Outcome: Progressing  Goal: Appropriate maternal -  bonding  Description: INTERVENTIONS:  - Identify family support  - Assess for appropriate maternal/infant bonding   -Encourage maternal/infant bonding opportunities  - Referral to  or  as needed  Outcome: Progressing  Goal: Establishment of infant feeding pattern  Description: INTERVENTIONS:  - Assess breast/bottle feeding  - Refer to lactation as needed  Outcome: Progressing  Goal: Incision(s), wounds(s) or drain site(s) healing without S/S of infection  Description: INTERVENTIONS  - Assess and document dressing, incision, wound bed, drain sites and surrounding tissue  - Provide patient and family education    Outcome: Progressing     Problem: PAIN - ADULT  Goal: Verbalizes/displays adequate comfort level or baseline comfort level  Description: Interventions:  - Encourage patient to monitor pain and request assistance  - Assess pain using appropriate pain scale  - Administer analgesics based on type and severity of pain and evaluate response  - Implement non-pharmacological measures as appropriate and evaluate response  - Consider cultural and social influences on pain and pain management  - Notify physician/advanced practitioner if interventions unsuccessful or patient reports new pain  Outcome: Progressing     Problem: INFECTION - ADULT  Goal: Absence or prevention of progression during hospitalization  Description: INTERVENTIONS:  - Assess and monitor for signs and symptoms of infection  - Monitor lab/diagnostic results  - Monitor all insertion sites, i.e. indwelling lines, tubes, and drains  - Monitor endotracheal if appropriate and nasal secretions for changes in amount and color  - [place] appropriate cooling/warming therapies per order  - Administer  medications as ordered  - Instruct and encourage patient and family to use good hand hygiene technique  - Identify and instruct in appropriate isolation precautions for identified infection/condition  Outcome: Progressing     Problem: SAFETY ADULT  Goal: Patient will remain free of falls  Description: INTERVENTIONS:  - Educate patient/family on patient safety including physical limitations  - Instruct patient to call for assistance with activity   - Keep Call bell within reach  - Keep bed low and locked with side rails adjusted as appropriate  - Keep care items and personal belongings within reach  - Initiate and maintain comfort rounds  - Make Fall Risk Sign visible to staff  Outcome: Progressing  Goal: Maintain or return to baseline ADL function  Description: INTERVENTIONS:  -  Assess patient's ability to carry out ADLs; assess patient's baseline for ADL function and identify physical deficits which impact ability to perform ADLs (bathing, care of mouth/teeth, toileting, grooming, dressing, etc.)  - Assess/evaluate cause of self-care deficits   - Assess range of motion  - Assess patient's mobility; develop plan if impaired  - Assess patient's need for assistive devices and provide as appropriate  - Encourage maximum independence but intervene and supervise when necessary  - Involve family in performance of ADLs  - Assess for home care needs following discharge   - Consider OT consult to assist with ADL evaluation and planning for discharge  - Provide patient education as appropriate  Outcome: Progressing  Goal: Maintains/Returns to pre admission functional level  Description: INTERVENTIONS:  - Set and communicate daily mobility goal to care team and patient/family/caregiver.   - Collaborate with rehabilitation services on mobility goals if consulted  Outcome: Progressing     Problem: Knowledge Deficit  Goal: Patient/family/caregiver demonstrates understanding of disease process, treatment plan, medications, and  discharge instructions  Description: Complete learning assessment and assess knowledge base.  Interventions:  - Provide teaching at level of understanding  - Provide teaching via preferred learning methods  Outcome: Progressing     Problem: DISCHARGE PLANNING  Goal: Discharge to home or other facility with appropriate resources  Description: INTERVENTIONS:  - Identify barriers to discharge w/patient and caregiver  - Arrange for needed discharge resources and transportation as appropriate  - Identify discharge learning needs (meds, wound care, etc.)  - Arrange for interpretive services to assist at discharge as needed  - Refer to Case Management Department for coordinating discharge planning if the patient needs post-hospital services based on physician/advanced practitioner order or complex needs related to functional status, cognitive ability, or social support system  Outcome: Progressing

## 2024-11-09 NOTE — LACTATION NOTE
This note was copied from a baby's chart.  CONSULT - LACTATION  Baby Boy Pimentel (Jihane) 2 days male MRN: [id number]    [facility] NURSERY Room / Bed: (N)/(N) Encounter: [id number]    Maternal Information     MOTHER:  Bette Pimentel  Maternal Age: 29 y.o.  OB History: # 1 - Date: 14, Sex: Male, Weight: 3515 g (7 lb 12 oz), GA: 39w0d, Type: , Unspecified, Apgar1: None, Apgar5: None, Living: Living, Birth Comments: None    # 2 - Date: 22, Sex: Male, Weight: 3885 g (8 lb 9 oz), GA: 39w1d, Type: , Low Transverse, Apgar1: 8, Apgar5: 9, Living: Living, Birth Comments: None    # 3 - Date: 24, Sex: Male, Weight: 3545 g (7 lb 13 oz), GA: 38w4d, Type: , Low Transverse, Apgar1: 9, Apgar5: 9, Living: Living, Birth Comments: None   Previouse breast reduction surgery? No    Lactation history:   Has patient previously breast fed: Yes   How long had patient previously breast fed: 5 mo. with 1st; 1 yr. with 2nd   Previous breast feeding complications: Breast/nipple pain, Other (Comment) (2nd had TT)     Past Surgical History:   Procedure Laterality Date     SECTION      GA  DELIVERY ONLY N/A 2022    Procedure:  SECTION () REPEAT;  Surgeon: Carmel Dumont MD;  Location: [facility] ;  Service: Obstetrics    TONSILLECTOMY         Birth information:  YOB: 2025   Time of birth: 9:27 AM   Sex: male   Delivery type: , Low Transverse   Birth Weight: 3545 g (7 lb 13 oz)   Percent of Weight Change: -8%     Gestational Age: 38w4d   [unfilled]    Assessment     Breast and nipple assessment: large breast and short nipple, sore, tender.    Feeding assessment: feeding well  LATCH:  Latch: Grasps breast, tongue down, lips flanged, rhythmic sucking   Audible Swallowing: Spontaneous and intermittent (24 hours old)   Type of Nipple: Everted (After stimulation)   Comfort (Breast/Nipple):  Filling, red/small blisters/bruises, mild/moderate discomfort   Hold (Positioning): Partial assist, teach one side, mother does other, staff holds   LATCH Score: 8          Feeding recommendations:   express colostrum and use a little bit of nipple cream after feedings and keep moist to promote healing.    Lea Flor RN 11/9/2024 2:30 PM

## 2024-11-09 NOTE — PLAN OF CARE
Problem: POSTPARTUM  Goal: Experiences normal postpartum course  Description: INTERVENTIONS:  - Monitor maternal vital signs  - Assess uterine involution and lochia  Outcome: Progressing  Goal: Appropriate maternal -  bonding  Description: INTERVENTIONS:  - Identify family support  - Assess for appropriate maternal/infant bonding   -Encourage maternal/infant bonding opportunities  - Referral to  or  as needed  Outcome: Progressing  Goal: Establishment of infant feeding pattern  Description: INTERVENTIONS:  - Assess breast/bottle feeding  - Refer to lactation as needed  Outcome: Progressing  Goal: Incision(s), wounds(s) or drain site(s) healing without S/S of infection  Description: INTERVENTIONS  - Assess and document dressing, incision, wound bed, drain sites and surrounding tissue  - Provide patient and family education    Outcome: Progressing     Problem: PAIN - ADULT  Goal: Verbalizes/displays adequate comfort level or baseline comfort level  Description: Interventions:  - Encourage patient to monitor pain and request assistance  - Assess pain using appropriate pain scale  - Administer analgesics based on type and severity of pain and evaluate response  - Implement non-pharmacological measures as appropriate and evaluate response  - Consider cultural and social influences on pain and pain management  - Notify physician/advanced practitioner if interventions unsuccessful or patient reports new pain  Outcome: Progressing     Problem: INFECTION - ADULT  Goal: Absence or prevention of progression during hospitalization  Description: INTERVENTIONS:  - Assess and monitor for signs and symptoms of infection  - Monitor lab/diagnostic results  - Monitor all insertion sites, i.e. indwelling lines, tubes, and drains  - Monitor endotracheal if appropriate and nasal secretions for changes in amount and color  - [place] appropriate cooling/warming therapies per order  - Administer  medications as ordered  - Instruct and encourage patient and family to use good hand hygiene technique  - Identify and instruct in appropriate isolation precautions for identified infection/condition  Outcome: Progressing     Problem: SAFETY ADULT  Goal: Patient will remain free of falls  Description: INTERVENTIONS:  - Educate patient/family on patient safety including physical limitations  - Instruct patient to call for assistance with activity   - Keep Call bell within reach  - Keep bed low and locked with side rails adjusted as appropriate  - Keep care items and personal belongings within reach  - Initiate and maintain comfort rounds  - Make Fall Risk Sign visible to staff  Outcome: Progressing  Goal: Maintain or return to baseline ADL function  Description: INTERVENTIONS:  -  Assess patient's ability to carry out ADLs; assess patient's baseline for ADL function and identify physical deficits which impact ability to perform ADLs (bathing, care of mouth/teeth, toileting, grooming, dressing, etc.)  - Assess/evaluate cause of self-care deficits   - Assess range of motion  - Assess patient's mobility; develop plan if impaired  - Assess patient's need for assistive devices and provide as appropriate  - Encourage maximum independence but intervene and supervise when necessary  - Involve family in performance of ADLs  - Assess for home care needs following discharge   - Consider OT consult to assist with ADL evaluation and planning for discharge  - Provide patient education as appropriate  Outcome: Progressing  Goal: Maintains/Returns to pre admission functional level  Description: INTERVENTIONS:  - Set and communicate daily mobility goal to care team and patient/family/caregiver.   - Collaborate with rehabilitation services on mobility goals if consulted  Outcome: Progressing     Problem: Knowledge Deficit  Goal: Patient/family/caregiver demonstrates understanding of disease process, treatment plan, medications, and  discharge instructions  Description: Complete learning assessment and assess knowledge base.  Interventions:  - Provide teaching at level of understanding  - Provide teaching via preferred learning methods  Outcome: Progressing     Problem: DISCHARGE PLANNING  Goal: Discharge to home or other facility with appropriate resources  Description: INTERVENTIONS:  - Identify barriers to discharge w/patient and caregiver  - Arrange for needed discharge resources and transportation as appropriate  - Identify discharge learning needs (meds, wound care, etc.)  - Arrange for interpretive services to assist at discharge as needed  - Refer to Case Management Department for coordinating discharge planning if the patient needs post-hospital services based on physician/advanced practitioner order or complex needs related to functional status, cognitive ability, or social support system  Outcome: Progressing

## 2024-11-09 NOTE — DISCHARGE SUMMARY
Discharge Summary - OB/GYN   Bette Pimentel 29 y.o. female MRN: [id number]  Unit/Bed#: -01 Encounter: [id number]      Admission Date: 2024     Discharge Date: ***    Admitting Diagnosis:   1. Pregnancy at 38w4d  2. Anemia    Discharge Diagnosis:   Same, delivered      Procedures: repeat  section, low transverse incision    Admitting Attending: Carmel Dumont, *    Delivering Attending:     Discharging Attending: ***      Hospital Course:     Bette Pimentel is a 29 y.o.  at 38w4d wks who was [facility]d [facility] setting of rupture membranes and labor.     She delivered a viable male  on 24 at 0027. Weight 7lbs 13oz via repeat  section, low transverse incision. Apgars were 9 (1 min) and 9 (5 min).  was transferred to  nursery. Patient tolerated the procedure well and was transferred to recovery in stable condition.     Her post-operative course was uncomplicated. Preoperative hemaglobin was 9.5, postoperative was 11. Her postoperative pain was well controlled with oral analgesics.    On day of discharge, she was ambulating and able to reasonably perform all ADLs. She was voiding and had appropriate bowel function. Pain was well controlled. She was discharged home on post-operative day #*** without complications. Patient was instructed to follow up with her OB as an outpatient and was given appropriate warnings to call provider if she develops signs of infection or uncontrolled pain.    Complications: none apparent    Condition at discharge: good     Discharge instructions/Information to patient and family:   See after visit summary for information provided to patient and family.      Provisions for Follow-Up Care:  See after visit summary for information related to follow-up care and any pertinent home health orders.      Disposition: Home    Planned Readmission: No    Discharge Medications:  For a complete list of the  patient's medications, please refer to her med rec.

## 2024-11-09 NOTE — ASSESSMENT & PLAN NOTE
- QBL: 170 mL, Hgb 11.5 --> 9.5  - Pain well controlled with oral analgesics  - Voiding spontaneously  - Tolerating PO fluids and solids  - Ambulating without difficulty  - DVT ppx with Lovenox 40mg, SCDs  - Contraception: delines  - Breastfeeding  - Anticipate discharge POD#2-4

## 2024-11-09 NOTE — PROGRESS NOTES
"Progress Note - OB/GYN  Bette Pimentel 29 y.o. female MRN: [id number]  Unit/Bed#:  313-01 Encounter: [id number]    Assessment and Plan     Bette Pimentel is a patient of[facility]. She is POD# 2 s/p  repeat  section, low transverse incision  Recovering well and is stable       * S/P repeat low transverse   Assessment & Plan  - QBL: 170 mL, Hgb 11.5 --> 9.5  - Pain well controlled with oral analgesics  - Voiding spontaneously  - Tolerating PO fluids and solids  - Ambulating without difficulty  - DVT ppx with Lovenox 40mg, SCDs  - Contraception: delines  - Breastfeeding  - Anticipate discharge POD#2-4          Disposition    - Anticipate discharge home on POD# 2-4      Subjective/Objective     Chief Complaint: Postpartum State     Subjective:    Bette Pimentel is POD#2 s/p  repeat  section, low transverse incision. She reports having to strain to  Pain is well controlled.  Patient is currently voiding.  She is ambulating.  Patient is currently passing flatus and has had no bowel movement. She is tolerating PO, and denies nausea or vomitting. Patient denies fever, chills, chest pain, shortness of breath, or calf tenderness. Lochia is normal. She is  Breastfeeding. She is recovering well and is stable.       Vitals:   BP 99/60 (BP Location: Right arm)   Pulse 77   Temp 97.9 °F (36.6 °C) (Oral)   Resp 16   Ht 5' 3\" (1.6 m)   Wt 88.9 kg (196 lb)   LMP 2024   SpO2 98%   Breastfeeding Yes   BMI 34.72 kg/m²       Intake/Output Summary (Last 24 hours) at 2024 0539  Last data filed at 2024 0900  Gross per 24 hour   Intake --   Output 400 ml   Net -400 ml       Invasive Devices       Peripheral Intravenous Line  Duration             Peripheral IV 24 Distal;Left;Ventral (anterior) Forearm 1 day                    Physical Exam:   GEN: Bette Pimentel appears well, alert and oriented x 3, pleasant and cooperative   CARDIO: RRR, no murmurs or rubs  RESP:  " CTAB, no wheezes or rales  ABDOMEN: soft, no tenderness, no distention, fundus @ umbilics, Incision C/D/I  EXTREMITIES: non tender, no erythema, b/l Jose F's sign negative      Labs:     Hemoglobin   Date Value Ref Range Status   11/08/2024 9.5 (L) 11.5 - 15.4 g/dL Final   11/07/2024 11.0 (L) 11.5 - 15.4 g/dL Final     WBC   Date Value Ref Range Status   11/08/2024 13.57 (H) 4.31 - 10.16 Thousand/uL Final   11/07/2024 8.79 4.31 - 10.16 Thousand/uL Final     Platelets   Date Value Ref Range Status   11/08/2024 174 149 - 390 Thousands/uL Final   11/07/2024 183 149 - 390 Thousands/uL Final     Creatinine   Date Value Ref Range Status   06/19/2024 0.40 (L) 0.60 - 1.30 mg/dL Final     Comment:     Standardized to IDMS reference method   03/31/2024 0.58 0.40 - 1.10 mg/dL Final   03/25/2024 0.53 0.40 - 1.10 mg/dL Final     AST   Date Value Ref Range Status   06/19/2024 13 13 - 39 U/L Final   03/31/2024 19 <41 U/L Final   03/25/2024 13 <41 U/L Final     ALT   Date Value Ref Range Status   06/19/2024 11 7 - 52 U/L Final     Comment:     Specimen collection should occur prior to Sulfasalazine administration due to the potential for falsely depressed results.    03/31/2024 17 <56 U/L Final   03/25/2024 11 <56 U/L Final

## 2024-11-10 VITALS
RESPIRATION RATE: 18 BRPM | HEIGHT: 63 IN | BODY MASS INDEX: 34.73 KG/M2 | TEMPERATURE: 98.2 F | HEART RATE: 94 BPM | SYSTOLIC BLOOD PRESSURE: 122 MMHG | DIASTOLIC BLOOD PRESSURE: 69 MMHG | OXYGEN SATURATION: 98 % | WEIGHT: 196 LBS

## 2024-11-10 RX ORDER — OXYCODONE HYDROCHLORIDE 5 MG/1
5 TABLET ORAL EVERY 4 HOURS PRN
Qty: 10 TABLET | Refills: 0 | Status: SHIPPED | OUTPATIENT
Start: 2024-11-10 | End: 2024-11-20

## 2024-11-10 RX ADMIN — IBUPROFEN 600 MG: 600 TABLET, FILM COATED ORAL at 05:35

## 2024-11-10 RX ADMIN — OXYCODONE HYDROCHLORIDE 5 MG: 5 TABLET ORAL at 04:06

## 2024-11-10 RX ADMIN — ACETAMINOPHEN 650 MG: 325 TABLET, FILM COATED ORAL at 12:03

## 2024-11-10 RX ADMIN — ACETAMINOPHEN 650 MG: 325 TABLET, FILM COATED ORAL at 04:00

## 2024-11-10 RX ADMIN — OXYCODONE HYDROCHLORIDE 10 MG: 10 TABLET ORAL at 08:28

## 2024-11-10 NOTE — PLAN OF CARE
Problem: POSTPARTUM  Goal: Experiences normal postpartum course  Description: INTERVENTIONS:  - Monitor maternal vital signs  - Assess uterine involution and lochia  Outcome: Progressing  Goal: Appropriate maternal -  bonding  Description: INTERVENTIONS:  - Identify family support  - Assess for appropriate maternal/infant bonding   -Encourage maternal/infant bonding opportunities  - Referral to  or  as needed  Outcome: Progressing  Goal: Establishment of infant feeding pattern  Description: INTERVENTIONS:  - Assess breast/bottle feeding  - Refer to lactation as needed  Outcome: Progressing  Goal: Incision(s), wounds(s) or drain site(s) healing without S/S of infection  Description: INTERVENTIONS  - Assess and document dressing, incision, wound bed, drain sites and surrounding tissue  - Provide patient and family education    Outcome: Progressing     Problem: PAIN - ADULT  Goal: Verbalizes/displays adequate comfort level or baseline comfort level  Description: Interventions:  - Encourage patient to monitor pain and request assistance  - Assess pain using appropriate pain scale  - Administer analgesics based on type and severity of pain and evaluate response  - Implement non-pharmacological measures as appropriate and evaluate response  - Consider cultural and social influences on pain and pain management  - Notify physician/advanced practitioner if interventions unsuccessful or patient reports new pain  Outcome: Progressing     Problem: INFECTION - ADULT  Goal: Absence or prevention of progression during hospitalization  Description: INTERVENTIONS:  - Assess and monitor for signs and symptoms of infection  - Monitor lab/diagnostic results  - Monitor all insertion sites, i.e. indwelling lines, tubes, and drains  - Monitor endotracheal if appropriate and nasal secretions for changes in amount and color  - [place] appropriate cooling/warming therapies per order  - Administer  medications as ordered  - Instruct and encourage patient and family to use good hand hygiene technique  - Identify and instruct in appropriate isolation precautions for identified infection/condition  Outcome: Progressing     Problem: SAFETY ADULT  Goal: Patient will remain free of falls  Description: INTERVENTIONS:  - Educate patient/family on patient safety including physical limitations  - Instruct patient to call for assistance with activity   - Keep Call bell within reach  - Keep bed low and locked with side rails adjusted as appropriate  - Keep care items and personal belongings within reach  - Initiate and maintain comfort rounds  - Make Fall Risk Sign visible to staff  Outcome: Progressing  Goal: Maintain or return to baseline ADL function  Description: INTERVENTIONS:  -  Assess patient's ability to carry out ADLs; assess patient's baseline for ADL function and identify physical deficits which impact ability to perform ADLs (bathing, care of mouth/teeth, toileting, grooming, dressing, etc.)  - Assess/evaluate cause of self-care deficits   - Assess range of motion  - Assess patient's mobility; develop plan if impaired  - Assess patient's need for assistive devices and provide as appropriate  - Encourage maximum independence but intervene and supervise when necessary  - Involve family in performance of ADLs  - Assess for home care needs following discharge   - Consider OT consult to assist with ADL evaluation and planning for discharge  - Provide patient education as appropriate  Outcome: Progressing  Goal: Maintains/Returns to pre admission functional level  Description: INTERVENTIONS:  - Set and communicate daily mobility goal to care team and patient/family/caregiver.   - Collaborate with rehabilitation services on mobility goals if consulted  Outcome: Progressing     Problem: Knowledge Deficit  Goal: Patient/family/caregiver demonstrates understanding of disease process, treatment plan, medications, and  discharge instructions  Description: Complete learning assessment and assess knowledge base.  Interventions:  - Provide teaching at level of understanding  - Provide teaching via preferred learning methods  Outcome: Progressing     Problem: DISCHARGE PLANNING  Goal: Discharge to home or other facility with appropriate resources  Description: INTERVENTIONS:  - Identify barriers to discharge w/patient and caregiver  - Arrange for needed discharge resources and transportation as appropriate  - Identify discharge learning needs (meds, wound care, etc.)  - Arrange for interpretive services to assist at discharge as needed  - Refer to Case Management Department for coordinating discharge planning if the patient needs post-hospital services based on physician/advanced practitioner order or complex needs related to functional status, cognitive ability, or social support system  Outcome: Progressing

## 2024-11-10 NOTE — PLAN OF CARE
Problem: POSTPARTUM  Goal: Experiences normal postpartum course  Description: INTERVENTIONS:  - Monitor maternal vital signs  - Assess uterine involution and lochia  11/10/2024 1250 by Jovita Garcia RN  Outcome: Completed  11/10/2024 0954 by Jovita Garcia RN  Outcome: Adequate for Discharge  Goal: Appropriate maternal -  bonding  Description: INTERVENTIONS:  - Identify family support  - Assess for appropriate maternal/infant bonding   -Encourage maternal/infant bonding opportunities  - Referral to  or  as needed  11/10/2024 1250 by Jovita Garcia RN  Outcome: Completed  11/10/2024 0954 by Jovita Garcia RN  Outcome: Adequate for Discharge  Goal: Establishment of infant feeding pattern  Description: INTERVENTIONS:  - Assess breast/bottle feeding  - Refer to lactation as needed  11/10/2024 1250 by Jovita Garcia RN  Outcome: Completed  11/10/2024 0954 by Jovita Garcia RN  Outcome: Adequate for Discharge  Goal: Incision(s), wounds(s) or drain site(s) healing without S/S of infection  Description: INTERVENTIONS  - Assess and document dressing, incision, wound bed, drain sites and surrounding tissue  - Provide patient and family education    11/10/2024 1250 by Jovita Garcia RN  Outcome: Completed  11/10/2024 0954 by Jovita Garcia RN  Outcome: Adequate for Discharge     Problem: PAIN - ADULT  Goal: Verbalizes/displays adequate comfort level or baseline comfort level  Description: Interventions:  - Encourage patient to monitor pain and request assistance  - Assess pain using appropriate pain scale  - Administer analgesics based on type and severity of pain and evaluate response  - Implement non-pharmacological measures as appropriate and evaluate response  - Consider cultural and social influences on pain and pain management  - Notify physician/advanced practitioner if interventions unsuccessful or patient reports new pain  11/10/2024 1250 by Jovita Garcia RN  Outcome:  Completed  11/10/2024 0954 by Jovita Garcia RN  Outcome: Adequate for Discharge     Problem: INFECTION - ADULT  Goal: Absence or prevention of progression during hospitalization  Description: INTERVENTIONS:  - Assess and monitor for signs and symptoms of infection  - Monitor lab/diagnostic results  - Monitor all insertion sites, i.e. indwelling lines, tubes, and drains  - Monitor endotracheal if appropriate and nasal secretions for changes in amount and color  - [place] appropriate cooling/warming therapies per order  - Administer medications as ordered  - Instruct and encourage patient and family to use good hand hygiene technique  - Identify and instruct in appropriate isolation precautions for identified infection/condition  11/10/2024 1250 by Jovita Garcia RN  Outcome: Completed  11/10/2024 0954 by Jovita Garcia RN  Outcome: Adequate for Discharge     Problem: SAFETY ADULT  Goal: Patient will remain free of falls  Description: INTERVENTIONS:  - Educate patient/family on patient safety including physical limitations  - Instruct patient to call for assistance with activity   - Keep Call bell within reach  - Keep bed low and locked with side rails adjusted as appropriate  - Keep care items and personal belongings within reach  - Initiate and maintain comfort rounds  - Make Fall Risk Sign visible to staff  11/10/2024 1250 by Jovita Garcia RN  Outcome: Completed  11/10/2024 0954 by Jovita Garcia RN  Outcome: Adequate for Discharge  Goal: Maintain or return to baseline ADL function  Description: INTERVENTIONS:  -  Assess patient's ability to carry out ADLs; assess patient's baseline for ADL function and identify physical deficits which impact ability to perform ADLs (bathing, care of mouth/teeth, toileting, grooming, dressing, etc.)  - Assess/evaluate cause of self-care deficits   - Assess range of motion  - Assess patient's mobility; develop plan if impaired  - Assess patient's need for assistive devices  and provide as appropriate  - Encourage maximum independence but intervene and supervise when necessary  - Involve family in performance of ADLs  - Assess for home care needs following discharge   - Consider OT consult to assist with ADL evaluation and planning for discharge  - Provide patient education as appropriate  11/10/2024 1250 by Jovita Garcia RN  Outcome: Completed  11/10/2024 0954 by Jovita Garcia RN  Outcome: Adequate for Discharge  Goal: Maintains/Returns to pre admission functional level  Description: INTERVENTIONS:  - Set and communicate daily mobility goal to care team and patient/family/caregiver.   - Collaborate with rehabilitation services on mobility goals if consulted  11/10/2024 1250 by Jovita Garcia RN  Outcome: Completed  11/10/2024 0954 by Jovita Garcia RN  Outcome: Adequate for Discharge     Problem: Knowledge Deficit  Goal: Patient/family/caregiver demonstrates understanding of disease process, treatment plan, medications, and discharge instructions  Description: Complete learning assessment and assess knowledge base.  Interventions:  - Provide teaching at level of understanding  - Provide teaching via preferred learning methods  11/10/2024 1250 by Jovita Garcia RN  Outcome: Completed  11/10/2024 0954 by Jovita Garcia RN  Outcome: Adequate for Discharge     Problem: DISCHARGE PLANNING  Goal: Discharge to home or other facility with appropriate resources  Description: INTERVENTIONS:  - Identify barriers to discharge w/patient and caregiver  - Arrange for needed discharge resources and transportation as appropriate  - Identify discharge learning needs (meds, wound care, etc.)  - Arrange for interpretive services to assist at discharge as needed  - Refer to Case Management Department for coordinating discharge planning if the patient needs post-hospital services based on physician/advanced practitioner order or complex needs related to functional status, cognitive ability, or  social support system  11/10/2024 1250 by Jovita Garcia, RN  Outcome: Completed  11/10/2024 0954 by Jovita Garcia, RN  Outcome: Adequate for Discharge

## 2024-11-10 NOTE — PLAN OF CARE
Problem: POSTPARTUM  Goal: Experiences normal postpartum course  Description: INTERVENTIONS:  - Monitor maternal vital signs  - Assess uterine involution and lochia  Outcome: Adequate for Discharge  Goal: Appropriate maternal -  bonding  Description: INTERVENTIONS:  - Identify family support  - Assess for appropriate maternal/infant bonding   -Encourage maternal/infant bonding opportunities  - Referral to  or  as needed  Outcome: Adequate for Discharge  Goal: Establishment of infant feeding pattern  Description: INTERVENTIONS:  - Assess breast/bottle feeding  - Refer to lactation as needed  Outcome: Adequate for Discharge  Goal: Incision(s), wounds(s) or drain site(s) healing without S/S of infection  Description: INTERVENTIONS  - Assess and document dressing, incision, wound bed, drain sites and surrounding tissue  - Provide patient and family education    Outcome: Adequate for Discharge     Problem: PAIN - ADULT  Goal: Verbalizes/displays adequate comfort level or baseline comfort level  Description: Interventions:  - Encourage patient to monitor pain and request assistance  - Assess pain using appropriate pain scale  - Administer analgesics based on type and severity of pain and evaluate response  - Implement non-pharmacological measures as appropriate and evaluate response  - Consider cultural and social influences on pain and pain management  - Notify physician/advanced practitioner if interventions unsuccessful or patient reports new pain  Outcome: Adequate for Discharge     Problem: INFECTION - ADULT  Goal: Absence or prevention of progression during hospitalization  Description: INTERVENTIONS:  - Assess and monitor for signs and symptoms of infection  - Monitor lab/diagnostic results  - Monitor all insertion sites, i.e. indwelling lines, tubes, and drains  - Monitor endotracheal if appropriate and nasal secretions for changes in amount and color  - [place] appropriate  cooling/warming therapies per order  - Administer medications as ordered  - Instruct and encourage patient and family to use good hand hygiene technique  - Identify and instruct in appropriate isolation precautions for identified infection/condition  Outcome: Adequate for Discharge     Problem: SAFETY ADULT  Goal: Patient will remain free of falls  Description: INTERVENTIONS:  - Educate patient/family on patient safety including physical limitations  - Instruct patient to call for assistance with activity   - Keep Call bell within reach  - Keep bed low and locked with side rails adjusted as appropriate  - Keep care items and personal belongings within reach  - Initiate and maintain comfort rounds  - Make Fall Risk Sign visible to staff  Outcome: Adequate for Discharge  Goal: Maintain or return to baseline ADL function  Description: INTERVENTIONS:  -  Assess patient's ability to carry out ADLs; assess patient's baseline for ADL function and identify physical deficits which impact ability to perform ADLs (bathing, care of mouth/teeth, toileting, grooming, dressing, etc.)  - Assess/evaluate cause of self-care deficits   - Assess range of motion  - Assess patient's mobility; develop plan if impaired  - Assess patient's need for assistive devices and provide as appropriate  - Encourage maximum independence but intervene and supervise when necessary  - Involve family in performance of ADLs  - Assess for home care needs following discharge   - Consider OT consult to assist with ADL evaluation and planning for discharge  - Provide patient education as appropriate  Outcome: Adequate for Discharge  Goal: Maintains/Returns to pre admission functional level  Description: INTERVENTIONS:  - Set and communicate daily mobility goal to care team and patient/family/caregiver.   - Collaborate with rehabilitation services on mobility goals if consulted  Outcome: Adequate for Discharge     Problem: Knowledge Deficit  Goal:  Patient/family/caregiver demonstrates understanding of disease process, treatment plan, medications, and discharge instructions  Description: Complete learning assessment and assess knowledge base.  Interventions:  - Provide teaching at level of understanding  - Provide teaching via preferred learning methods  Outcome: Adequate for Discharge     Problem: DISCHARGE PLANNING  Goal: Discharge to home or other facility with appropriate resources  Description: INTERVENTIONS:  - Identify barriers to discharge w/patient and caregiver  - Arrange for needed discharge resources and transportation as appropriate  - Identify discharge learning needs (meds, wound care, etc.)  - Arrange for interpretive services to assist at discharge as needed  - Refer to Case Management Department for coordinating discharge planning if the patient needs post-hospital services based on physician/advanced practitioner order or complex needs related to functional status, cognitive ability, or social support system  Outcome: Adequate for Discharge

## 2024-11-10 NOTE — LACTATION NOTE
This note was copied from a baby's chart.  CONSULT - LACTATION  Baby Boy (Michelle Pimentel 3 days male MRN: [id number]    [facility] Bed[facility] (N)/(N) Encounter: [id number]    Maternal Information     MOTHER:  Bette Pimentel  Maternal Age: 29 y.o.  OB History: # 1 - Date: 14, Sex: Male, Weight: 3515 g (7 lb 12 oz), GA: 39w0d, Type: , Unspecified, Apgar1: None, Apgar5: None, Living: Living, Birth Comments: None    # 2 - Date: 22, Sex: Male, Weight: 3885 g (8 lb 9 oz), GA: 39w1d, Type: , Low Transverse, Apgar1: 8, Apgar5: 9, Living: Living, Birth Comments: None    # 3 - Date: 24, Sex: Male, Weight: 3545 g (7 lb 13 oz), GA: 38w4d, Type: , Low Transverse, Apgar1: 9, Apgar5: 9, Living: Living, Birth Comments: None   Previouse breast reduction surgery? No    Lactation history:   Has patient previously breast fed: Yes   How long had patient previously breast fed: 5 months and 12 months each older child   Previous breast feeding complications: Breast/nipple pain, Other (Comment) (oral ties)     Past Surgical History:   Procedure Laterality Date     SECTION      OR  DELIVERY ONLY N/A 2022    Procedure:  SECTION () REPEAT;  Surgeon: Carmel Dumont MD;  Location: [facility] ;  Service: Obstetrics    TONSILLECTOMY         Birth information:  YOB: 2025   Time of birth: 9:27 AM   Sex: male   Delivery type: , Low Transverse   Birth Weight: 3545 g (7 lb 13 oz)   Percent of Weight Change: -10%     Gestational Age: 38w4d   [unfilled]    Assessment     Breast and nipple assessment: sore nipple and cracked nipples     Assessment:  mid to anterior lingual frenulum, assessed 1 hour post feed, unable to fully evaluate functionality, blistering on lips    Feeding assessment: latch difficulty (shallow latches and R breast preference)  LATCH:  Latch: Grasps breast,  tongue down, lips flanged, rhythmic sucking   Audible Swallowing: Spontaneous and intermittent (24 hours old)   Type of Nipple: Everted (After stimulation)   Comfort (Breast/Nipple): Filling, red/small blisters/bruises, mild/moderate discomfort   Hold (Positioning): Partial assist, teach one side, mother does other, staff holds   LATCH Score: 8          Feeding recommendations:   Breast feed on demand; pump and pace bottle feed if no latch    Met with Bette who is breastfeeding her baby boy. She reports that baby has some difficulty latching on both breasts and prefers the right breast. She states some latches feel comfortable and deep, but recently have been uncomfortable. She presents with vertical scabbing on her nipple faces. She has been pumping and FOB bottle fed. Her mature milk has started to transition in.     Baby was fed one hour prior to consult and was sleeping in bassinet. He has visible blisters on his lips. He has a mid to anterior lingual frenulum, but was sleepy during assessment and functionality could not be evaluated. Bette reports her older child had a diagnosed tongue tie and was seen by [facility].    Recommended follow up with [facility] for lactation support and provider oral assessment. Encouraged facial massage for baby to encourage a wide mouth. Reviewed initial latch on pain and shallow latch pain. Encouraged supportive positioning. Encouraged ample skin to skin to meet early cues. Encouraged pumping if baby does not latch to provide milk and maintain supply. Encouraged pumping with comfortable suction settings and using a properly fitted flange- sized to 21 mm today. Provided with lanolin and telfa pads for nipple healing. Discussed lubricating the flanges for a more comfortable pumping experience. Reviewed paced bottle feeding with parents.      Kathleen Sibley MA 11/10/2024 11:47 AM

## 2024-11-11 NOTE — UTILIZATION REVIEW
NOTIFICATION OF ADMISSION DISCHARGE   This is a Notification of Discharge from [facility]. Please be advised that this patient has been discharge from our facility. Below you will find the admission and discharge date and time including the patient’s disposition.   UTILIZATION REVIEW CONTACT:  Lissette Cox  Utilization   Network Utilization Review Department  Phone: [phone] x carefully listen to the prompts. All voicemails are confidential.  Email: [email or web address]     ADMISSION INFORMATION  PRESENTATION DATE: 11/7/2024  7:30 AM  OBERVATION ADMISSION DATE: N/A  INPATIENT ADMISSION DATE: 11/7/24  7:30 AM   DISCHARGE DATE: 11/10/2024  2:04 PM   DISPOSITION:Home/Self Care    Network Utilization Review Department  ATTENTION: Please call with any questions or concerns to [phone] and carefully listen to the prompts so that you are directed to the right person. All voicemails are confidential.   For Discharge needs, contact Care Management DC Support Team at [phone] opt. 2  Send all requests for admission clinical reviews, approved or denied determinations and any other requests to dedicated fax number below belonging to the campus where the patient is receiving treatment. List of dedicated fax numbers for the Facilities:  FACILITY NAME UR FAX NUMBER   ADMISSION DENIALS (Administrative/Medical Necessity) [phone]   DISCHARGE SUPPORT TEAM ([facility]) [phone]   PARENT CHILD HEALTH (Maternity/NICU/Pediatrics) [phone]   [facility] [phone]   [facility] [phone]   [facility] [phone]   [facility] [phone]   Critical access hospital [phone]   [facility] [phone]   [facility] [phone]   [facility] [phone]    [facility] [phone]   [facility] [phone]   [facility] [phone]   [facility] [phone]

## 2024-11-13 LAB — PLACENTA IN STORAGE: NORMAL

## 2024-12-04 ENCOUNTER — TELEPHONE (OUTPATIENT)
Dept: OBGYN CLINIC | Facility: CLINIC | Age: 29
End: 2024-12-04

## 2024-12-09 ENCOUNTER — OFFICE VISIT (OUTPATIENT)
Dept: POSTPARTUM | Facility: CLINIC | Age: 29
End: 2024-12-09
Payer: MEDICARE

## 2024-12-09 PROCEDURE — 99404 PREV MED CNSL INDIV APPRX 60: CPT | Performed by: PEDIATRICS

## 2024-12-09 NOTE — PROGRESS NOTES
INITIAL BREAST FEEDING EVALUATION    Informant/Relationship: Bette and Talat    Discussion of General Lactation Issues: Nirmal has had trouble latching since birth.  When he does latch, it is very painful.    Infant is 4 weeks old today.        History:  Fertility Problem:yes - Bette's last two pregnancies were conceived through IVF  for unexplained infertility  Breast changes:yes - breasts were chavez and tender, areolas were larger and darker  :  No. Repeat .  Delivered a little early after spontaneous ROM  Full term:yes - 38 4/7 weeks   labor:no  First nursing/attempt < 1 hour after birth:yes - attempted in the RR but baby did not latch  Skin to skin following delivery:yes - in the RR  Breast changes after delivery:yes - breasts are chavez.  Milk was in by day3  Rooming in (infant in room with mother with exception of procedures, eg. Circumcision:  yes  Blood sugar issues:no  NICU stay:no  Jaundice:no  Phototherapy:no  Supplement given: (list supplement and method used as well as reason(s):yes - expressed colostrum via syringe due to latch issues.    Past Medical History:   Diagnosis Date    COVID-19 affecting pregnancy in third trimester 2022    Formatting of this note might be different from the original.  + test 22 (29w3d)     Last Assessment & Plan:   Formatting of this note is different from the original.  COVID-19 Positive: 22  GA at DX: 29 weeks  Initial S/S: Cough fever chills, fatigue, body aches  Current S/S: None  Medication/MAB: None  Vaccine Status: J&J when first came out, No other vaccine     Counseled that informa    Hypothyroidism     Varicella     had chicken pox         Current Outpatient Medications:     APNO compound (mupirocin ointment 2%-betamethasone ointment 0.1%-miconazole powder 2%), Apply a small amount to nipple after each feeding. Clean nipple w/soap and water before next feeding., Disp: 30 g, Rfl: 0    benzocaine-menthol-lanolin-aloe  (DERMOPLAST) 20-0.5 % topical spray, Apply 1 Application topically every 6 (six) hours as needed for irritation, Disp: , Rfl:     hydrocortisone 1 % cream, Apply 1 Application topically daily as needed for irritation, Disp: , Rfl:     ibuprofen (MOTRIN) 600 mg tablet, Take 1 tablet (600 mg total) by mouth every 6 (six) hours, Disp: 30 tablet, Rfl: 0    witch hazel-glycerin (TUCKS) topical pad, Apply 1 Pad topically every 4 (four) hours as needed for irritation, Disp: , Rfl:     No Known Allergies    Social History     Substance and Sexual Activity   Drug Use No       Social History Never a smoker    Interval Breastfeeding History:  Frequency of breast feeding: Has not attempted for about 4 days.  Prior to that was trying with every feeding cue.  Does mother feel breastfeeding is effective: No  Does infant appear satisfied after nursing:No  Stooling pattern normal: Yes  Urinating frequently:Yes  Using shield or shells: No    Alternative/Artificial Feedings:   Bottle: Yes, Nirmal is exclusively bottle fed using a Zomee nipple and paced feeding technique  Cup: No  Syringe/Finger: No           Formula Type: Similac                     Amount: up to 6 ounces a day for the last 10 days or so when expressed milk is not available            Breast Milk:                      Amount: 4.5 ounces            Frequency Q 2.5-4 Hr between feedings  Elimination Problems: No      Equipment:  Nipple Shield             Type: none             Size: n/a             Frequency of Use: n/a  Pump            Type: Zomee Z2            Frequency of Use: Every 3 hours during the day and up to 5 hours at night.  Shells            Type: none            Frequency of use: n/a    Equipment Problems: yes pumping is painful and her flanges do not fit well    Mom:  Breast: Medium sized symmetrical breasts.  Slightly conical shape.  Nipple Assessment in General: Normal: elongated/eraser, no discoloration and no damage noted.  Mother's Awareness of  Feeding Cues                 Recognizes: Yes                  Verbalizes: Yes  Support System: FOB, extended family. FOB works out of state and Bette's mother is currently ill and not available to help.  Bette is finding it challenging trying to care for her  while her very active toddler is not being supervised by someone else.  History of Breastfeeding:  her oldest child for 5 months.  Also fed formula the entire time.   her middle child for a year. There were early challenges related to his tongue tie that resolved after frenotomy.  Changes/Stressors/Violence: Bette is concerned that Nirmal has not been breastfeeding effectively, that breastfeeding is painful and that her milk production is not enough to meet all of Nirmal's needs.  Concerns/Goals: Bette desires to breastfeed exclusively and without pain.    Problems with Mom: attachment associated pain and decreasing milk production    Physical Exam  Constitutional:       Appearance: Normal appearance.   HENT:      Head: Normocephalic and atraumatic.      Nose: Nose normal.   Pulmonary:      Effort: Pulmonary effort is normal.   Musculoskeletal:         General: Normal range of motion.      Cervical back: Normal range of motion and neck supple.   Neurological:      Mental Status: She is alert and oriented to person, place, and time.   Skin:     General: Skin is warm and dry.   Psychiatric:         Mood and Affect: Mood normal.         Behavior: Behavior normal.         Thought Content: Thought content normal.         Judgment: Judgment normal.         Infant:  Behaviors: Alert  Color: Normal  Birth weight: 3545 grams  Current weight: 4445 grams    Problems with infant: not latching or nursing effectively, restricted tongue movement      General Appearance:  Alert, active, no distress                             Head:  Normocephalic, AFOF, sutures opposed                             Eyes:  Conjunctiva clear, no drainage                               Ears:  Normally placed, no anomolies                             Nose:  No drainage or erythema                           Mouth:  No lesions. The tongue did not extend beyond the lower lip, lateralizes fairly well but remains flat when he cries.  Some cupping of my finger felt as he sucked on my finger but cupping was lost with very gentle pressure on his chin.  Very little suction generated as he sucked.  Some fasciculation of the lower jaw felt as he sucked.  Some peristalsis felt as he sucked but not consistently.  The lingual frenulum is short, slightly broad, attached midway between the tip and the base of the tongue.                      Neck:  Supple, symmetrical, trachea midline                 Respiratory:  No grunting, flaring, retractions, breath sounds clear and equal            Cardiovascular:  Regular rate and rhythm. No murmur. Adequate perfusion/capillary refill. Femoral pulse present                    Abdomen:   Soft, non-tender, no masses, bowel sounds present, no HSM             Genitourinary:  Normal male, testes descended, no discharge, swelling, or pain, anus patent                          Spine:   No abnormalities noted        Musculoskeletal:  Full range of motion          Skin/Hair/Nails:   Skin warm, dry, and intact, no rashes or abnormal dyspigmentation or lesions                Neurologic:   No abnormal movement, tone appropriate     [place] Latch:  Efficiency:               Lips Flanged: Yes              Depth of latch: fair, better after repositioning              Audible Swallow: Yes, a few suckling bursts observed              Visible Milk: Yes              Wide Open/ Asymmetrical: Yes, after repositioning and manual adjustment of the lower jaw              Suck Swallow Cycle: Breathing: unlabored, Coordinated: yes  Nipple Assessment after latch: Normal: elongated/eraser, no discoloration and no damage noted.  Latch Problems: Bette needed some support with  positioning.  After education, she was able to guide him to a fairly good latch without difficulty and without pain.  Nirmal nursed on both breasts and appeared content.  He popped off and tugged on the nipple occasionally as he fed.  He did not feed for more than a few minutes on the second breast.    Position:  Infant's Ergonomics/Body               Body Alignment: Yes               Head Supported: Yes               Close to Mom's body/ Lifted/ Supported: Yes               Mom's Ergonomics/Body: Yes                           Supported: Yes                           Sitting Back: Yes                           Brings Baby to her breast: Yes, after education  Positioning Problems: Bette positioned Nirmal fairly well in cross cradle hold with a pillow for support.  I encouraged her to bring Nirmal even closer to her, to wrap his arms around her breast and to move him onto the breast when he opened his mouth wide.      Handouts:   None    Education:  Reviewed Latch: Demonstrated how to gently compress the breast and align the baby so that his nose is just above the nipple with his lower lip and chin touching the breast to encourage the deepest, widest, off-center latch. Discussed possible reasons why Nirmal is having trouble latching and nursing effectively  Reviewed Positioning for Dyad: Demonstrated how to position mom comfortably and supported with her baby belly to belly prior to bringing her baby to her breast  Reviewed Frequency/Supply & Demand: Discussed how a baby not breastfeeding effectively can impact milk production  Reviewed Alternative/Artificial Feedings: discussed and demonstrated paced bottle feeding  Reviewed Equipment: Discussed and demonstrated the use and features of the Zomee Z2 and how to determine what size flange to use.      Plan:    Reassurance and support given.  I acknowledged Bette's concerns and her commitment to breastfeeding.  I encouraged her to resume direct breastfeeding as  often as she feels comfortable doing so.  We worked on positioning to improve her comfort and confidence.  I suggested she continue pumping when a feeding at the breast is missed.  I reviewed effective use of her pump and how to determine what size flange to use.  A follow up appointment was scheduled for ongoing evaluation and support.  An appointment was also scheduled with Dr Vo for more evaluation of Nirmal's feeding challenges and for help with milk production.  I encouraged Garrettmilkapia to call with any questions or concerns.    I have spent 90 minutes with Patient and family today in which greater than 50% of this time was spent in counseling/coordination of care regarding Patient and family education and Counseling / Coordination of care.

## 2024-12-09 NOTE — PATIENT INSTRUCTIONS
"Continue breastfeeding as often as you are able/comfortable doing so.  Continue pumping when a feeding at the breast is missed.  When pumping, cycle your pump through stimulation and expression mode several times in a session to stimulate several let downs until you have expressed enough milk to feed the baby and to achieve breast comfort.  There is no need to \"empty\" the breast completely. Use gentle hands on pumping and hand expression   Maintain your pump as recommended. Use flange that fits comfortably and allows the breast to empty effectively.  I suggest a 19 mm flange.  Follow up as scheduled.  Please call with any questions or concerns.  "

## 2024-12-10 NOTE — PROGRESS NOTES
I have reviewed the notes, assessments, and/or procedures performed by Zo Rincon RN, IBCLC, I concur with her/his documentation of Bette Vo MD 12/09/24

## 2024-12-23 ENCOUNTER — TELEPHONE (OUTPATIENT)
Dept: OBGYN CLINIC | Facility: CLINIC | Age: 29
End: 2024-12-23

## 2024-12-23 NOTE — TELEPHONE ENCOUNTER
LMOM x3 (two by myself and one by Navigator) for Pt to try and schedule her post partum visit. Please transfer to the office for scheduling.

## 2025-01-10 ENCOUNTER — POSTPARTUM VISIT (OUTPATIENT)
Dept: OBGYN CLINIC | Facility: CLINIC | Age: 30
End: 2025-01-10

## 2025-01-10 VITALS
SYSTOLIC BLOOD PRESSURE: 98 MMHG | HEIGHT: 63 IN | DIASTOLIC BLOOD PRESSURE: 68 MMHG | WEIGHT: 176.6 LBS | BODY MASS INDEX: 31.29 KG/M2

## 2025-01-10 DIAGNOSIS — Z98.891 S/P REPEAT LOW TRANSVERSE C-SECTION: ICD-10-CM

## 2025-01-10 PROCEDURE — 99024 POSTOP FOLLOW-UP VISIT: CPT | Performed by: OBSTETRICS & GYNECOLOGY

## 2025-01-10 NOTE — PROGRESS NOTES
Assessment & Plan     Normal postpartum exam.     1. Contraception: IUD - Mirena.  2. Annual exam due in .   3. Increase activity as tolerated, may resume all normal activity.  4. Lactation consult needed: no; if yes, [facility] information discussed.   5. Limited support,  works for multiple days at a time in [place], no other family, intermittent FMLA filled out for  until 6 months PP    I have spent a total time of 25 minutes in caring for this patient on the day of the visit/encounter including Diagnostic results, Prognosis, Risks and benefits of tx options, Impressions, and Counseling / Coordination of care.        Subjective   Chief Complaint   Patient presents with    Postpartum Care     Patient presents for her postpartum visit.       Bette Pimentel is a 29 y.o.  female who presents for a postpartum visit.     Delivery Method: RLTCS.   Delivery Date and Time: 2024  Delivery Attending: Julia  Gestational Age at delivery: 38.4wga   Route of Delivery: RLTCS  Reason for  delivery:  prior     Admitting Diagnoses:   Patient Active Problem List   Diagnosis    Allergic rhinitis    Thyroid disorder    Thyroid nodule    History of BCG vaccination    Anemia during pregnancy in second trimester    HRP (high risk pregnancy)    Pregnancy conceived through in vitro fertilization    Previous  delivery, antepartum    S/P repeat low transverse     Uterine contractions       Gestational Diabetes: no  Pregnancy Complications: none    Anesthesia: spinal,       Baby's Name: Nimral Hernandez   Baby's Weight: [id number]    Apgar scores: 9 and 9 at 1 and 5 minutes, respectively  Breast or formula: Breastfeeding  Pediatrician: Jose Nieves    Bleeding none. Bowel function: normal. Bladder function: normal. The patient is not having any pain.     Patient has not been sexually active. Desired contraception method is cOCP (estrogen/progesterone).    Postpartum  depression screening: negative. EPDS : 3. She denies depression/anxiety/trouble sleeping.     Last Pap : 2021 ; no abnormalities      The following portions of the patient's history were reviewed and updated as appropriate: allergies, current medications, past family history, past medical history, past social history, past surgical history, and problem list.      Current Outpatient Medications:     ibuprofen (MOTRIN) 600 mg tablet, Take 1 tablet (600 mg total) by mouth every 6 (six) hours, Disp: 30 tablet, Rfl: 0    No Known Allergies    Review of Systems  Review of Systems   Constitutional:  Positive for fatigue.   HENT: Negative.     Respiratory: Negative.     Cardiovascular: Negative.    Gastrointestinal: Negative.    Genitourinary: Negative.          Objective      There were no vitals taken for this visit.    Physical Exam  Constitutional:       Appearance: Normal appearance.   HENT:      Head: Normocephalic and atraumatic.   Pulmonary:      Effort: Pulmonary effort is normal.   Abdominal:      General: Abdomen is flat.      Palpations: Abdomen is soft.   Neurological:      Mental Status: She is alert.   Vitals and nursing note reviewed.         Incision: clean, dry, and intact

## 2025-01-12 PROBLEM — O09.819 PREGNANCY CONCEIVED THROUGH IN VITRO FERTILIZATION: Status: RESOLVED | Noted: 2022-05-31 | Resolved: 2025-01-12

## 2025-01-12 PROBLEM — O99.012 ANEMIA DURING PREGNANCY IN SECOND TRIMESTER: Status: RESOLVED | Noted: 2022-07-18 | Resolved: 2025-01-12

## 2025-01-12 PROBLEM — O34.219 PREVIOUS CESAREAN DELIVERY, ANTEPARTUM: Status: RESOLVED | Noted: 2022-07-14 | Resolved: 2025-01-12

## 2025-01-12 PROBLEM — O47.9 UTERINE CONTRACTIONS: Status: RESOLVED | Noted: 2024-10-25 | Resolved: 2025-01-12

## 2025-01-12 PROBLEM — O09.90 HRP (HIGH RISK PREGNANCY): Status: RESOLVED | Noted: 2022-05-31 | Resolved: 2025-01-12

## 2025-01-13 ENCOUNTER — OFFICE VISIT (OUTPATIENT)
Dept: URGENT CARE | Facility: CLINIC | Age: 30
End: 2025-01-13
Payer: MEDICARE

## 2025-01-13 VITALS
WEIGHT: 176 LBS | BODY MASS INDEX: 30.05 KG/M2 | SYSTOLIC BLOOD PRESSURE: 101 MMHG | OXYGEN SATURATION: 98 % | HEIGHT: 64 IN | TEMPERATURE: 97.3 F | HEART RATE: 82 BPM | DIASTOLIC BLOOD PRESSURE: 63 MMHG | RESPIRATION RATE: 18 BRPM

## 2025-01-13 DIAGNOSIS — R68.89 FLU-LIKE SYMPTOMS: Primary | ICD-10-CM

## 2025-01-13 PROCEDURE — 99213 OFFICE O/P EST LOW 20 MIN: CPT | Performed by: NURSE PRACTITIONER

## 2025-01-13 NOTE — PATIENT INSTRUCTIONS
"You may use Robitussin, Mucinex, or Delsym for cough    Tylenol every 4 hours for pain or fever  Ibuprofen every 6 hours for pain or fever  Increase fluid intake   Follow up with your PCP    Patient Education     Flu in adults - Discharge instructions   The Basics   Written by the doctors and editors at [facility]   What are discharge instructions? -- Discharge instructions are information about how to take care of yourself after getting medical care for a health problem.  What is the flu? -- This is an infection that can cause fever, cough, body aches, and other symptoms. The most common type of flu is the \"seasonal\" flu. There are different forms of seasonal flu, for example, \"type A\" and \"type B.\" The medical term for the flu is \"influenza.\"  All forms of the flu are caused by viruses. Antibiotics do not work to treat the flu. Doctors might prescribe you an \"antiviral\" medicine. If so, follow your doctor's instructions. The flu can be dangerous because it can cause a serious lung infection called pneumonia.  How do I care for myself at home? -- Ask the doctor or nurse what you should do when you go home. Make sure that you understand exactly what you need to do to care for yourself. Ask questions if there is anything you do not understand.  You should also:   Take all of your medicines as instructed, even if you are feeling better.   Get lots of rest. Sleep when you feel tired. Avoid doing tiring activities.   Take warm, steamy showers to help soothe your cough.   Use hard candy or cough drops to soothe a sore throat and cough.   Try to thin mucus:   Drink lots of liquids.   Use a cool mist humidifier, if your doctor told you to. Keep the humidifier clean.   Use saline nose drops to relieve stuffiness.   Take a medicine like acetaminophen (sample brand name: Tylenol) or ibuprofen (sample brand names: Advil, Motrin) to help bring down your fever.   Dress in lightweight clothes if you have a fever. Cover with a light " sheet or blanket if needed. This will help keep you from getting too warm.   Lower the chance of passing the infection to others:   Stay home while you are feeling sick or have a fever.   At home, try to limit close contact with other people. You can also help protect others by wearing a face mask.   Wash your hands often (figure 1). Alcohol-based hand sanitizers also work to kill germs.   Cover your mouth and nose with the inside of your elbow when you cough or sneeze.   Avoid touching your eyes, nose, and mouth.   Do not share cups, food, towels, bedding, or other personal items with others.   Clean items and surfaces you often touch. Examples include sinks, counters, tables, door handles, remotes, and phones. Germs can often live on surfaces for a few hours. Use a bleach and water mixture or a cleaning product that gets rid of viruses.   Do not go to work or school until your symptoms are improving and your fever has been gone for at least 24 hours without taking medicine such as acetaminophen.  It's also important to get a flu vaccine each year. Some years, the flu vaccine is more effective than others. But even in years when it is less effective, it still helps prevent some cases of the flu. It can also help keep you from getting severely ill if you do get the flu.  What follow-up care do I need? -- Your doctor or nurse will tell you if you need to make a follow-up appointment. If so, make sure that you know when and where to go.  When should I call the doctor? -- Call for emergency help right away (in the [place] and [place], call 9-1-1) if you:   Are having so much trouble breathing that you can only say 1 or 2 words at a time   Need to sit upright at all times to be able to breathe, or cannot lie down   Are very tired from working to catch your breath, or are sweating from trying to breathe  Call for advice if you:   Have trouble breathing when talking or sitting still   Have severe chest discomfort   Feel  confused or disoriented   Are vomiting and can't keep liquids down   Have early signs of fluid loss, such as:   Dark-colored urine   Dry mouth   Muscle cramps   Lack of energy   Feeling lightheaded when you stand up  All topics are updated as new evidence becomes available and our peer review process is complete.  This topic retrieved from [employer] on: Apr 27, 2024.  Topic 790115 Version 2.0  Release: 32.4.2 - C32.116  © 2024 UpToDate, Inc. and/or its affiliates. All rights reserved.  figure 1: How to wash your hands     Wet your hands with clean water, and apply a small amount of soap. Lather and rub hands together for at least 20 seconds. Clean your wrists, palms, backs of your hands, between your fingers, tips of your fingers, thumbs, and under and around your nails. Rinse well, and dry your hands using a clean towel.  Graphic 519841 Version 7.0  Consumer Information Use and Disclaimer   Disclaimer: This generalized information is a limited summary of diagnosis, treatment, and/or medication information. It is not meant to be comprehensive and should be used as a tool to help the user understand and/or assess potential diagnostic and treatment options. It does NOT include all information about conditions, treatments, medications, side effects, or risks that may apply to a specific patient. It is not intended to be medical advice or a substitute for the medical advice, diagnosis, or treatment of a health care provider based on the health care provider's examination and assessment of a patient's specific and unique circumstances. Patients must speak with a health care provider for complete information about their health, medical questions, and treatment options, including any risks or benefits regarding use of medications. This information does not endorse any treatments or medications as safe, effective, or approved for treating a specific patient. UpToDate, Inc. and its affiliates disclaim any warranty or  liability relating to this information or the use thereof.The use of this information is governed by the Terms of Use, available at https://www.wolterskluwer.com/en/know/clinical-effectiveness-terms. 2024© UpToDate, Inc. and its affiliates and/or licensors. All rights reserved.  Copyright   © 2024 [employer] and/or its affiliates. All rights reserved.

## 2025-01-13 NOTE — PROGRESS NOTES
[facility]        NAME: Bette Pimentel is a 29 y.o. female  : 1995    MRN: [id number]  DATE: 2025  TIME: 4:35 PM    Assessment and Plan   Flu-like symptoms [R68.89]  1. Flu-like symptoms          Medical Decision Making     PROBLEM: 1 acute, uncomplicated illness or injury    DATA: Note(s) Reviewed: no  Lab(s) Reviewed: no none  Test(s) Ordered: no  Independent Historian Involved: no  Imaging independently reviewed today: no  Case was discussed with another provider: no    RISK: Over-the-counter medication(s)        Patient Instructions   You may use Robitussin, Mucinex, or Delsym for cough    Tylenol every 4 hours for pain or fever  Ibuprofen every 6 hours for pain or fever  Increase fluid intake   Follow up with your PCP    Follow up with PCP in 3-5 days.  Proceed to  ER if symptoms worsen.    Chief Complaint     Chief Complaint   Patient presents with    Flu-like symptoms     A week of body aches, fever, congestion, and cough.         History of Present Illness       Patient is a 29 female who presents with 1 week of productive cough, fever, congestion and body aches. She is also complaining of severe rib pain with cough. Patient has three children who have been diagnosed with influenza A this week. Patient has been taking tylenol and ibuprofen with improvement in symptoms. Patient is breast feeding so has limited medication management to tylenol and ibuprofen.         Review of Systems   Review of Systems   Constitutional:  Positive for fatigue and fever.   HENT:  Positive for congestion. Negative for sore throat.    Respiratory:  Positive for cough. Negative for shortness of breath and wheezing.    Cardiovascular:  Negative for chest pain.   Gastrointestinal:  Negative for diarrhea, nausea and vomiting.   Musculoskeletal:  Positive for myalgias.         Current Medications       Current Outpatient Medications:     ibuprofen (MOTRIN) 600 mg tablet, Take 1 tablet (600 mg total) by  mouth every 6 (six) hours, Disp: 30 tablet, Rfl: 0    Current Allergies     Allergies as of 2025    (No Known Allergies)            The following portions of the patient's history were reviewed and updated as appropriate: allergies, current medications, past family history, past medical history, past social history, past surgical history and problem list.     Past Medical History:   Diagnosis Date    COVID-19 affecting pregnancy in third trimester 2022    Formatting of this note might be different from the original.  + test 22 (29w3d)     Last Assessment & Plan:   Formatting of this note is different from the original.  COVID-19 Positive: 22  GA at DX: 29 weeks  Initial S/S: Cough fever chills, fatigue, body aches  Current S/S: None  Medication/MAB: None  Vaccine Status: J&J when first came out, No other vaccine     Counseled that informa    Hypothyroidism     Varicella     had chicken pox       Past Surgical History:   Procedure Laterality Date     SECTION      [place]  DELIVERY ONLY N/A 2022    Procedure:  SECTION () REPEAT;  Surgeon: Carmel Dumont MD;  [facility]: [facility];  Service: Obstetrics    [place]  DELIVERY [place] N/A 2024    Procedure:  SECTION () REPEAT;  Surgeon: Carmel Dumont MD;  [facility];  Service: Obstetrics    TONSILLECTOMY         Family History   Problem Relation Age of Onset    Thyroid disease Mother     Diabetes type II Mother     Asthma Father     Thyroid disease Sister     No Known Problems Sister     No Known Problems Brother     No Known Problems Son     Breast cancer Maternal Grandmother     Heart disease Maternal Grandmother     Hypertension Maternal Grandmother     Heart disease Maternal Grandfather     Hypertension Maternal Grandfather     Thyroid cancer Paternal Grandmother          Medications have been verified.        Objective   /63 (BP Location: Right arm, Patient Position:  "Sitting, Cuff Size: Large)   Pulse 82   Temp (!) 97.3 °F (36.3 °C) (Temporal)   Resp 18   Ht 5' 4\" (1.626 m)   Wt 79.8 kg (176 lb)   SpO2 98%   BMI 30.21 kg/m²        Physical Exam     Physical Exam  Vitals reviewed.   Constitutional:       General: She is awake.      Appearance: Normal appearance.   HENT:      Head: Normocephalic.      Right Ear: Hearing, tympanic membrane, ear canal and external ear normal.      Left Ear: Hearing, tympanic membrane, ear canal and external ear normal.      Nose: Nose normal.      Mouth/Throat:      Lips: Pink.      Pharynx: Oropharynx is clear.   Cardiovascular:      Rate and Rhythm: Normal rate and regular rhythm.      Heart sounds: Normal heart sounds, S1 normal and S2 normal.   Pulmonary:      Effort: Pulmonary effort is normal.      Breath sounds: Normal breath sounds. No decreased breath sounds, wheezing or rhonchi.   Skin:     General: Skin is warm and moist.   Neurological:      General: No focal deficit present.      Mental Status: She is alert and oriented to person, place, and time.   Psychiatric:         Behavior: Behavior is cooperative.                   "

## 2025-04-02 ENCOUNTER — EVALUATION (OUTPATIENT)
Dept: PHYSICAL THERAPY | Facility: REHABILITATION | Age: 30
End: 2025-04-02
Payer: MEDICARE

## 2025-04-02 DIAGNOSIS — M54.16 LUMBAR RADICULOPATHY: Primary | ICD-10-CM

## 2025-04-02 PROCEDURE — 97161 PT EVAL LOW COMPLEX 20 MIN: CPT | Performed by: PHYSICAL THERAPIST

## 2025-04-02 PROCEDURE — 97110 THERAPEUTIC EXERCISES: CPT | Performed by: PHYSICAL THERAPIST

## 2025-04-02 NOTE — LETTER
2025    Vasquez LAZAR MD  [address] [place] [address] Office  [facility] [address]    Patient: Bette Pimentel   YOB: 1995   Date of Visit: 2025     Encounter Diagnosis     ICD-10-CM    1. Lumbar radiculopathy  M54.16           Dear Dr. Vasquez LAZAR MD:    Thank you for your recent referral of Bette Pimentel. Please review the attached evaluation summary from Bette's recent visit.     Please verify that you agree with the plan of care by signing the attached order.     If you have any questions or concerns, please do not hesitate to call.     I sincerely appreciate the opportunity to share in the care of one of your patients and hope to have another opportunity to work with you in the near future.       Sincerely,    Yuval Wilson, PT      Referring Provider:      I certify that I have read the below Plan of Care and certify the need for these services furnished under this plan of treatment while under my care.                    Vasquez LAZAR MD  [address] [place] [address] Office  [facility] [address]  Via Fax: [phone]          PT Evaluation     Today's date: 2025  Patient name: Bette Pimentel  : 1995  MRN: [id number]  Referring provider: Vasquez Gold MD  Dx:   Encounter Diagnosis     ICD-10-CM    1. Lumbar radiculopathy  M54.16                      Assessment  Impairments: abnormal or restricted ROM, impaired physical strength, lacks appropriate home exercise program and pain with function    Assessment details: Bette Pimentel is a 29 y.o. female who presents with pain, decreased strength, and decreased ROM.  Due to these impairments, patient has difficulty performing a/iadls and recreational activities.   Patient demonstrates signs and symptoms consistent with L/S radiculopathy with a current extension preference of the L/S.  Patient would benefit from skilled physical therapy to address their aforementioned impairments,  improve their level of function and to improve their overall quality of life.     Goals  Short term goals - to be achieved in 4 weeks:    Decrease pain 20-50%.   Increase strength by 1/2 grade.   Improve L/S ROM by 25%.  Long term goals - to be achieved by discharge:    Lifting is improved to maximal level of function.    Performance in related household activities is improved to maximal level of function.    IADL performance in related activities is improved to maximal level of function.    Sitting tolerance is improved to maximal level of function.     Standing tolerance is improved to maximal level of function.     Plan    Planned therapy interventions: manual therapy, neuromuscular re-education, patient/caregiver education, postural training, strengthening, stretching, therapeutic activities, therapeutic exercise and home exercise program    Frequency: 2x week  Duration in weeks: 6  Plan of Care beginning date: 2025  Plan of Care expiration date: 2025  Treatment plan discussed with: patient        Subjective Evaluation    History of Present Illness  Mechanism of injury: Patient refers to PT with c/o pain in her low back and radicular symptoms in bilateral LE's (right greater than left) which began approximately one month previous of insidious onset.  Patient received an MRI of her L/S on 3/26/25 which revealed L5-S1 right paracentral disc protrusion with mass effect on right S1 nerve root and impression on right ventral thecal sac.  Patient states she has been ambulating with a rolling walker indoors and outdoors for approximately one week secondary to pain.  Patient states she is not able to sit for longer than five minutes without increased pain.  Patient currently requires assistance for donning/doffing socks and shoes.       Pain  Current pain ratin  At best pain ratin  At worst pain rating: 10          Objective     Neurological Testing     Sensation     Lumbar   Left   Intact: light  touch    Right   Diminished: light touch    Comments   Right light touch: Decreased light touch L3, L4, L5    Active Range of Motion     Lumbar   Flexion:  Restriction level: maximal  Extension:  Restriction level: maximal  Left lateral flexion:  Restriction level: maximal  Right lateral flexion:  Restriction level: maximal    Strength/Myotome Testing     Left Hip   Planes of Motion   Flexion: 3- (pain)    Right Hip   Planes of Motion   Flexion: 3- (pain)    Left Knee   Flexion: 4- (pain)  Extension: 4- (pain)    Right Knee   Flexion: 4- (pain)  Extension: 3- (pain)    Left Ankle/Foot   Dorsiflexion: 4- (pain)  Plantar flexion: 4- (pain)    Right Ankle/Foot   Dorsiflexion: 4- (pain)  Plantar flexion: 4- (pain)    General Comments:      Lumbar Comments  Repeated extension in standing against table: Abolished radicular symptoms, better             Precautions: None      Manuals 4/2                                                                Neuro Re-Ed             Sup TA activation             Sup TA with marches             Sup TA with alt UE/LE             Bridges                                                    Ther Ex             Bike or TM             L/S ext in stand against table/counter HEP            Prone press ups HEP            TB rows             TB lat pull downs             Mini squats with TA activ.                                       Ther Activity                                       Gait Training                                       Modalities                                                             warm

## 2025-04-02 NOTE — PROGRESS NOTES
PT Evaluation     Today's date: 2025  Patient name: Bette Pimentel  : 1995  MRN: [id number]  Referring provider: Vasquez Gold MD  Dx:   Encounter Diagnosis     ICD-10-CM    1. Lumbar radiculopathy  M54.16                      Assessment  Impairments: abnormal or restricted ROM, impaired physical strength, lacks appropriate home exercise program and pain with function    Assessment details: Bette Pimentel is a 29 y.o. female who presents with pain, decreased strength, and decreased ROM.  Due to these impairments, patient has difficulty performing a/iadls and recreational activities.   Patient demonstrates signs and symptoms consistent with L/S radiculopathy with a current extension preference of the L/S.  Patient would benefit from skilled physical therapy to address their aforementioned impairments, improve their level of function and to improve their overall quality of life.     Goals  Short term goals - to be achieved in 4 weeks:    Decrease pain 20-50%.   Increase strength by 1/2 grade.   Improve L/S ROM by 25%.  Long term goals - to be achieved by discharge:    Lifting is improved to maximal level of function.    Performance in related household activities is improved to maximal level of function.    IADL performance in related activities is improved to maximal level of function.    Sitting tolerance is improved to maximal level of function.     Standing tolerance is improved to maximal level of function.     Plan    Planned therapy interventions: manual therapy, neuromuscular re-education, patient/caregiver education, postural training, strengthening, stretching, therapeutic activities, therapeutic exercise and home exercise program    Frequency: 2x week  Duration in weeks: 6  Plan of Care beginning date: 2025  Plan of Care expiration date: 2025  Treatment plan discussed with: patient        Subjective Evaluation    History of Present Illness  Mechanism of injury: Patient refers to PT  with c/o pain in her low back and radicular symptoms in bilateral LE's (right greater than left) which began approximately one month previous of insidious onset.  Patient received an MRI of her L/S on 3/26/25 which revealed L5-S1 right paracentral disc protrusion with mass effect on right S1 nerve root and impression on right ventral thecal sac.  Patient states she has been ambulating with a rolling walker indoors and outdoors for approximately one week secondary to pain.  Patient states she is not able to sit for longer than five minutes without increased pain.  Patient currently requires assistance for donning/doffing socks and shoes.       Pain  Current pain ratin  At best pain ratin  At worst pain rating: 10          Objective     Neurological Testing     Sensation     Lumbar   Left   Intact: light touch    Right   Diminished: light touch    Comments   Right light touch: Decreased light touch L3, L4, L5    Active Range of Motion     Lumbar   Flexion:  Restriction level: maximal  Extension:  Restriction level: maximal  Left lateral flexion:  Restriction level: maximal  Right lateral flexion:  Restriction level: maximal    Strength/Myotome Testing     Left Hip   Planes of Motion   Flexion: 3- (pain)    Right Hip   Planes of Motion   Flexion: 3- (pain)    Left Knee   Flexion: 4- (pain)  Extension: 4- (pain)    Right Knee   Flexion: 4- (pain)  Extension: 3- (pain)    Left Ankle/Foot   Dorsiflexion: 4- (pain)  Plantar flexion: 4- (pain)    Right Ankle/Foot   Dorsiflexion: 4- (pain)  Plantar flexion: 4- (pain)    General Comments:      Lumbar Comments  Repeated extension in standing against table: Abolished radicular symptoms, better             Precautions: None      Manuals 4/2                                                                Neuro Re-Ed             Sup TA activation             Sup TA with marches             Sup TA with alt UE/LE             Bridges                                                     Ther Ex             Bike or TM             L/S ext in stand against table/counter HEP            Prone press ups HEP            TB rows             TB lat pull downs             Mini squats with TA activ.                                       Ther Activity                                       Gait Training                                       Modalities

## 2025-04-08 ENCOUNTER — OFFICE VISIT (OUTPATIENT)
Dept: PHYSICAL THERAPY | Facility: REHABILITATION | Age: 30
End: 2025-04-08
Payer: MEDICARE

## 2025-04-08 ENCOUNTER — TELEPHONE (OUTPATIENT)
Dept: FAMILY MEDICINE CLINIC | Facility: CLINIC | Age: 30
End: 2025-04-08

## 2025-04-08 DIAGNOSIS — M54.16 LUMBAR RADICULOPATHY: Primary | ICD-10-CM

## 2025-04-08 PROCEDURE — 97110 THERAPEUTIC EXERCISES: CPT

## 2025-04-08 PROCEDURE — 97112 NEUROMUSCULAR REEDUCATION: CPT

## 2025-04-08 NOTE — PROGRESS NOTES
Daily Note     Today's date: 2025  Patient name: Bette Pimentel  : 1995  MRN: [id number]  Referring provider: Vasquez Peterson MD  Dx:   Encounter Diagnosis     ICD-10-CM    1. Lumbar radiculopathy  M54.16              Subjective: Reports 7/10 LBP upon arrival, but states this is better than last time, has been trying not to use walker. States she is only able to sit for 5 min due to pain, reports experiencing pain down RLE. Notes compliance with HEP.      Objective: See treatment diary below      Assessment: Focused session primarily on extension biased exercises. Demo decreased endurance with initiation of core exercises, limited sets performed as a result. Noted neural tension with sciatic nerve glides on RLE which improved with repetitions. Requests to have PT also for neck/thoracic pain--will plan to complete RE NV. Would continue to benefit from PT for core stability and strengthening.      Plan: Continue per plan of care.  Progress treatment as tolerated.       Precautions: None      Manuals                                                                Neuro Re-Ed             Sup TA activation  5'', 1x10           Sup TA with marches  1x10           Sup TA with alt UE/LE  1x10           Bridges  1x10                                                  Ther Ex             Bike or TM  5'           L/S ext in stand against table/counter HEP            Prone press ups HEP 5'', 10x           TB rows  Yellow stroop  1x10           TB lat pull downs  Yellow stroop  1x10           Mini squats with TA activ.             Sciatic nerve glides  Supine w/ strap  RLE  10x                        Ther Activity                                       Gait Training                                       Modalities

## 2025-04-08 NOTE — TELEPHONE ENCOUNTER
Patient no showed for appt this morning with Dr Jeannie RUFFOM if patient would like to reschedule that appt.

## 2025-04-14 ENCOUNTER — EVALUATION (OUTPATIENT)
Dept: PHYSICAL THERAPY | Facility: REHABILITATION | Age: 30
End: 2025-04-14
Payer: MEDICARE

## 2025-04-14 DIAGNOSIS — M54.6 THORACIC SPINE PAIN: ICD-10-CM

## 2025-04-14 DIAGNOSIS — M54.16 LUMBAR RADICULOPATHY: Primary | ICD-10-CM

## 2025-04-14 DIAGNOSIS — M54.2 NECK PAIN: ICD-10-CM

## 2025-04-14 PROCEDURE — 97530 THERAPEUTIC ACTIVITIES: CPT

## 2025-04-14 PROCEDURE — 97164 PT RE-EVAL EST PLAN CARE: CPT

## 2025-04-14 PROCEDURE — 97112 NEUROMUSCULAR REEDUCATION: CPT

## 2025-04-14 NOTE — PROGRESS NOTES
PT Re-Evaluation  as a DIRECT ACCESS EVALUATION to add in neck/thoracic diagnosis    Today's date: 2025  Patient name: Bette Pimentel  : 1995  MRN: [id number]  Referring provider: Vasquez Gold MD  Dx:   Encounter Diagnosis     ICD-10-CM    1. Lumbar radiculopathy  M54.16       2. Neck pain  M54.2       3. Thoracic spine pain  M54.6             Start Time: 1525  Stop Time: 1625  Total time in clinic (min): 60 minutes    Assessment  Impairments: abnormal or restricted ROM, impaired physical strength, lacks appropriate home exercise program and pain with function    Assessment details: Bette Pimentel is a 29 y.o. female who presents with pain, decreased strength, and decreased ROM.  Due to these impairments, patient has difficulty performing a/iadls and recreational activities.   Patient demonstrates signs and symptoms consistent with L/S radiculopathy with a current extension preference of the L/S.  Patient would benefit from skilled physical therapy to address their aforementioned impairments, improve their level of function and to improve their overall quality of life.     Goals  Short term goals - to be achieved in 4 weeks:    Decrease pain 20-50%.   Increase strength by 1/2 grade.   Improve L/S ROM by 25%.  Long term goals - to be achieved by discharge:    Lifting is improved to maximal level of function.    Performance in related household activities is improved to maximal level of function.    IADL performance in related activities is improved to maximal level of function.    Sitting tolerance is improved to maximal level of function.     Standing tolerance is improved to maximal level of function.     Plan    Planned therapy interventions: manual therapy, neuromuscular re-education, patient/caregiver education, postural training, strengthening, stretching, therapeutic activities, therapeutic exercise and home exercise program    Frequency: 2x week  Duration in weeks: 6  Plan of Care beginning  date: 4/2/2025  Plan of Care expiration date: 5/16/2025  Treatment plan discussed with: patient        Subjective Evaluation    History of Present Illness  Mechanism of injury: RE: to add neck/thoracic spine pain as diagnosis, per pt request as a DIRECT ACCESS EVALUATION.  Pt reports that she has been having neck and thoracic spine pain for about 3 years now. Pt reports that she was here previously for same condition 3 years ago and was only able to do a few sessions then she had to stop because of her baby and work schedule. Pt reports that this has been constant since. Pt now has 3 kids, youngest is 5 months old; 2nd is 2 years and 5 months old, and oldest is almost 11 years old.     Difficulties/limitations: pain is constant - mainly in shoulder blade region; has to work through her pain; mom is helping at home. Has sleep disturbances b/c of her lower back. Denies difficulty with turning head/looking up/down. Pt reports she will wake up with tingling/numbness in the morning in her arm whichever arm/side she is sleeping on; which subsides momentarily once she wakes up. Carrying baby. 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 Pt reports she has been using formula some of the time because it is too painful for her to nurse or carry baby (nurses up to 30 minutes).     Denies bowel/bladder issues. Denies saddle paraesthesias. However, pt noted that she has felt tingling in her pelvic floor region at times (before she injured her back). Has had 3 c-sections (most recent 5 months ago).     MRI from 8/4/2022:  MRI CERVICAL SPINE WITHOUT CONTRAST     INDICATION: M54.12: Radiculopathy, cervical region.     COMPARISON:  None.     TECHNIQUE:  Sagittal T1, sagittal T2, sagittal inversion recovery, axial T2, axial  2D merge     IMAGE QUALITY:  Degraded by patient motion artifact.     FINDINGS:     ALIGNMENT:  Normal alignment of the cervical spine.  No compression fracture.  No subluxation.  No scoliosis.     MARROW  SIGNAL:  Normal marrow signal is identified within the visualized bony structures.  No discrete marrow lesion.     CERVICAL AND VISUALIZED THORACIC CORD:  Normal signal within the visualized cord.     PREVERTEBRAL AND PARASPINAL SOFT TISSUES:  Normal.     VISUALIZED POSTERIOR FOSSA:  The visualized posterior fossa demonstrates no abnormal signal.     CERVICAL DISC SPACES:     C2-C3:  Normal.     C3-C4:  Normal.     C4-C5:  Small central protrusion at disc herniation.  No significant central or foraminal narrowing.     C5-C6:  Normal.     C6-C7:  Small right paramedian protrusion type disc herniation.  No significant central or foraminal narrowing.     C7-T1:  Normal.     UPPER THORACIC DISC SPACES:  Normal.     IMPRESSION:     Small protrusion-type disc herniations are seen at C4-5 and C6-7 not resulting in significant central or foraminal narrowing.  Image quality is somewhat degraded by patient motion artifact.            IE: Patient refers to PT with c/o pain in her low back and radicular symptoms in bilateral LE's (right greater than left) which began approximately one month previous of insidious onset.  Patient received an MRI of her L/S on 3/26/25 which revealed L5-S1 right paracentral disc protrusion with mass effect on right S1 nerve root and impression on right ventral thecal sac.  Patient states she has been ambulating with a rolling walker indoors and outdoors for approximately one week secondary to pain.  Patient states she is not able to sit for longer than five minutes without increased pain.  Patient currently requires assistance for donning/doffing socks and shoes.       Pain  Current pain rating: 3  At best pain rating: 3  At worst pain ratin  Quality: dull ache  Relieving factors: medications, heat and relaxation (massage)  Aggravating factors: sitting and walking          Objective     Neurological Testing     Sensation     Lumbar   Left   Intact: light touch    Right   Diminished: light  touch    Comments   Right light touch: Decreased light touch L3, L4, L5    Active Range of Motion   Cervical/Thoracic Spine       Cervical    Flexion:  WFL  Extension:  WFL  Left lateral flexion:  WFL  Right lateral flexion:  WFL  Left rotation:  WFL  Right rotation:  WFL    Lumbar   Flexion:  Restriction level: maximal  Extension:  Restriction level: maximal  Left lateral flexion:  Restriction level: maximal  Right lateral flexion:  Restriction level: maximal    Additional Active Range of Motion Details  C-spine ROM assessment somewhat guarded due to having severe LBP, which is 8/10 currently.     BUE shld ROM grossly WFL t/o.         Strength/Myotome Testing     Left Hip   Planes of Motion   Flexion: 3- (pain)    Right Hip   Planes of Motion   Flexion: 3- (pain)    Left Knee   Flexion: 4- (pain)  Extension: 4- (pain)    Right Knee   Flexion: 4- (pain)  Extension: 3- (pain)    Left Ankle/Foot   Dorsiflexion: 4- (pain)  Plantar flexion: 4- (pain)    Right Ankle/Foot   Dorsiflexion: 4- (pain)  Plantar flexion: 4- (pain)    General Comments:      Lumbar Comments  Repeated extension in standing against table: Abolished radicular symptoms, better                   Precautions: None      Manuals 4/2 4/8 4/14                                    Re-eval   Completed to add neck/thoracic diagnoses                       Neuro Re-Ed             Sup TA activation  5'', 1x10           Sup TA with marches  1x10           Sup TA with alt UE/LE  1x10           Bridges  1x10                        Chin tucks -> ext             B/L shld ER (no monies) with TB                                                    Ther Ex             Bike or TM  5' Np - very painful today          L/S ext in stand against table/counter HEP  Several sets of 10 until pain plateued                       Seated upper thoracic spine ext w/ 1/2 foam roll in small of back in chair                          Prone press ups HEP 5'', 10x           TB rows  Yellow  stroop  1x10 Yellow stroop 2x10 with core brace          TB lat pull downs  Yellow stroop  1x10 Yellow stroop 1x10 with core brace          Mini squats with TA activ.             Sciatic nerve glides  Supine w/ strap  RLE  10x                        Ther Activity             Pt education   10' on keeping spinal neutral with POC to progress focusing on score stability + ergonomics + sleep positioning                       Gait Training                                       Modalities                                            Assessment: Initiated with re-evaluation to add neck/thoracic spine pain to treatment. Subjective/objective findings as noted above - Lumbar/BLE's findings are carried over from  on 4/2/25. Pt requested to focus on back treatment afterwards. Held off on doing the recumbent bike for warmup due to high pain levels; thus initiating with standing repeated backward bends onto mat table w/ pillow behind back. Pt's pain level reduced significantly with each set of 10 completed, total of 4-5 sets, to 2/10 from 7-8/10. Focused heavily on pt education with doing repeated standing lumbar extensions against counter top at home and to do so until pain plateau's. Explained importance of core stabilization/techniques for spinal stability. Also educated on importance of ergonomics e.g. looking at phone and computer setup; as well as use of rolled up towel roll inserted in pillow for sleep positioning as well as for lumbar support in sitting/driving.

## 2025-04-17 ENCOUNTER — APPOINTMENT (OUTPATIENT)
Dept: PHYSICAL THERAPY | Facility: REHABILITATION | Age: 30
End: 2025-04-17
Payer: MEDICARE

## 2025-04-17 ENCOUNTER — OFFICE VISIT (OUTPATIENT)
Dept: PHYSICAL THERAPY | Facility: REHABILITATION | Age: 30
End: 2025-04-17
Payer: MEDICARE

## 2025-04-17 DIAGNOSIS — M54.2 NECK PAIN: ICD-10-CM

## 2025-04-17 DIAGNOSIS — M54.16 LUMBAR RADICULOPATHY: Primary | ICD-10-CM

## 2025-04-17 DIAGNOSIS — M54.6 THORACIC SPINE PAIN: ICD-10-CM

## 2025-04-17 PROCEDURE — 97110 THERAPEUTIC EXERCISES: CPT

## 2025-04-17 NOTE — PROGRESS NOTES
Daily Note     Today's date: 2025  Patient name: Bette Pimentel  : 1995  MRN: [id number]  Referring provider: Vasquez Peterson MD  Dx:   Encounter Diagnosis     ICD-10-CM    1. Lumbar radiculopathy  M54.16       2. Neck pain  M54.2       3. Thoracic spine pain  M54.6           Start Time: 1213  Stop Time: 1302  Total time in clinic (min): 49 minutes    Subjective: Pt reports she felt great for 2 days after the last treatment. States last night her pain started up again. This morning she is at about a 7/10 upon arrival.       Objective: See treatment diary below      Assessment: Tolerated treatment well overall. Initiated with repeated standing backward bends onto hi-low mat table, in sets of 10; reduced pain down to 4/10 from 7/10 then pt c/o stiffness in her LB region so we did seated modified piriformis stretch which helped. Then pt resumed additional sets of 10 of standing backward bends against hi-low table until pain reduced to almost no pain. Attempted standing hip extension, which seemed to aggravate symptoms, thus only 1 set of 10 done. Attempted mini squats with 2 airex cushions on chair but pt was guarded and favoring towards R side. Attempted lateral shift towards R side into extension, this seemed to aggravate pt more. So we then progressed to doing prone ex's as indicated below. Patient finished treatment with pain levels at 2/10 following JOSHUA and PPU's. Updated HEP in [facility] and updated to pt's chart thru [facility].  Patient demonstrated fatigue post treatment, exhibited good technique with therapeutic exercises, and would benefit from continued PT.      Plan: Continue per plan of care.      Precautions: None      Manuals                                    Re-eval   Completed to add neck/thoracic diagnoses                       Neuro Re-Ed             Sup TA activation  5'', 1x10  Possible nv?                       Hooklying glute sets    Possible nv?                      "  Sup TA with marches  1x10           Sup TA with alt UE/LE  1x10           Bridges  1x10                        Chin tucks -> ext             B/L shld ER (no monies) with TB                          Standing hip ext     At hi-low table for support - 1x10 ea side - this aggravated pt's symptoms         Standing bird dogs alt UE/LE when able             Progress to quadruped alt UE lift when able             Progress to quadruped alt LE lift when able              Progress to quadruped alt UE/LE lift when able                                                     Ther Ex             Bike or TM  5' Np - very painful today          L/S ext in stand against table/counter HEP  Several reps of 10 until pain plateaus Several reps of 10 until pain plateaus with short walking rest breaks in between as need - did this initially and t/o treatment as needed                      Seated modified self piriformis stretch     5x15\" ea side did this in between last sets of standing backward bends                      Seated upper thoracic spine ext w/ 1/2 foam roll in small of back in chair             JOSHUA     X 2 min         Prone press ups HEP 5'', 10x  2x10, 5\" holds         Prone hip ext    Possible nv??         TB rows  Yellow stroop  1x10 Yellow stroop 2x10 with core brace Yellow stroop 2x10 with core brace         TB lat pull downs  Yellow stroop  1x10 Yellow stroop 1x10 with core brace np         Mini squats with TA activ.    *With 2 airex on chair - challenging for pt as she favors towards R side due to L side LBP - this aggravated symptoms         Sciatic nerve glides  Supine w/ strap  RLE  10x  Possible nv - do active (flossing)?                      Ther Activity             Pt education   10' on keeping spinal neutral with POC to progress focusing on score stability + ergonomics + sleep positioning                       Gait Training                                       Modalities                                     "

## 2025-04-17 NOTE — HOME EXERCISE EDUCATION
Program_ID:[id number]   Access Code: [email or web address]  URL: [email or web address]  Date: 04-  Prepared By: Sparkle Ghotra    Program Notes      Exercises      - Standing Lumbar Extension - 1 x daily - 7 x weekly - 1-3 sets - 10 reps - 3-5 hold      - Standing Shoulder Row with Anchored Resistance - 1 x daily - 7 x weekly - 1-3 sets - 10 reps      - Shoulder extension with resistance - Neutral - 1 x daily - 7 x weekly - 1-3 sets - 10 reps      - Seated Figure 4 Piriformis Stretch - 1 x daily - 7 x weekly - 1-2 sets - 5 reps - 15 hold

## 2025-04-17 NOTE — HOME EXERCISE EDUCATION
Program_ID:[id number]   Access Code: [email or web address]  URL: [email or web address]  Date: 04-  Prepared By: Sparkle Ghotra    Program Notes      Exercises      - Standing Lumbar Extension - 1 x daily - 7 x weekly - 1-3 sets - 10 reps - 3-5 hold      - Standing Shoulder Row with Anchored Resistance - 1 x daily - 7 x weekly - 1-3 sets - 10 reps      - Shoulder extension with resistance - Neutral - 1 x daily - 7 x weekly - 1-3 sets - 10 reps      - Seated Figure 4 Piriformis Stretch - 1 x daily - 7 x weekly - 1-2 sets - 5 reps - 15 hold      - Static Prone on Elbows - 1 x daily - 7 x weekly - 1-3 sets - 1-5 reps - 2-3 minute hold      - Prone Press Up - 1 x daily - 7 x weekly - 1-3 sets - 10 reps - 5 hold

## 2025-06-12 ENCOUNTER — NURSE TRIAGE (OUTPATIENT)
Dept: OTHER | Facility: OTHER | Age: 30
End: 2025-06-12

## 2025-06-12 ENCOUNTER — OFFICE VISIT (OUTPATIENT)
Dept: FAMILY MEDICINE CLINIC | Facility: CLINIC | Age: 30
End: 2025-06-12
Payer: COMMERCIAL

## 2025-06-12 VITALS
TEMPERATURE: 96 F | HEIGHT: 64 IN | SYSTOLIC BLOOD PRESSURE: 118 MMHG | DIASTOLIC BLOOD PRESSURE: 70 MMHG | HEART RATE: 92 BPM | RESPIRATION RATE: 16 BRPM | WEIGHT: 186 LBS | BODY MASS INDEX: 31.76 KG/M2

## 2025-06-12 DIAGNOSIS — J01.00 ACUTE NON-RECURRENT MAXILLARY SINUSITIS: Primary | ICD-10-CM

## 2025-06-12 DIAGNOSIS — J01.00 ACUTE NON-RECURRENT MAXILLARY SINUSITIS: ICD-10-CM

## 2025-06-12 PROCEDURE — 99213 OFFICE O/P EST LOW 20 MIN: CPT | Performed by: NURSE PRACTITIONER

## 2025-06-12 RX ORDER — AMOXICILLIN 875 MG/1
875 TABLET, COATED ORAL 2 TIMES DAILY
Qty: 14 TABLET | Refills: 0 | Status: SHIPPED | OUTPATIENT
Start: 2025-06-12 | End: 2025-06-13

## 2025-06-12 NOTE — TELEPHONE ENCOUNTER
"REASON FOR CONVERSATION: Sinus Problem    SYMPTOMS: sinus congestion, sore throat, earache    OTHER HEALTH INFORMATION: requested appt today    PROTOCOL DISPOSITION: See PCP Within 24 Hours    CARE ADVICE PROVIDED: same day appt today    PRACTICE FOLLOW-UP: no          Reason for Disposition   Earache    Answer Assessment - Initial Assessment Questions  1. LOCATION: \"Where does it hurt?\"         Sore throat, ears     2. ONSET: \"When did the sinus pain start?\"  (e.g., hours, days)         yesterday    5. NASAL CONGESTION: \"Is the nose blocked?\" If Yes, ask: \"Can you open it or must you breathe through your mouth?\"        Yes    6. NASAL DISCHARGE: \"Do you have discharge from your nose?\" If so ask, \"What color?\"        Green    7. FEVER: \"Do you have a fever?\" If Yes, ask: \"What is it, how was it measured, and when did it start?\"         no    Protocols used: Sinus Pain or Congestion-Adult-AH    "

## 2025-06-12 NOTE — PROGRESS NOTES
"Name: Bette Pimentel      : 1995      MRN: [id number]  Encounter Provider: GARRY Nolasco  Encounter Date: 2025   Encounter department: [facility]  :  Assessment & Plan  Acute non-recurrent maxillary sinusitis  Take antibiotics until finished.  Reviewed symptomatic treatment.  F/u as needed  Orders:    amoxicillin (AMOXIL) 875 mg tablet; Take 1 tablet (875 mg total) by mouth 2 (two) times a day for 7 days           History of Present Illness   Here today with complaints of sinus congestion, sinus pressure, postnasal drip.  This has been ongoing for the past several days.  Nasal drainage is thick green/yellow.  No associated fevers or breathing difficulties.  She is currently breast-feeding      Review of Systems   Constitutional:  Negative for chills, fatigue and fever.   HENT:  Positive for congestion, postnasal drip and sinus pressure. Negative for ear pain, rhinorrhea and sore throat.    Respiratory:  Negative for cough, shortness of breath and wheezing.    Cardiovascular:  Negative for chest pain.   Gastrointestinal:  Negative for abdominal pain, diarrhea, nausea and vomiting.   Musculoskeletal:  Negative for arthralgias.   Skin:  Negative for rash.   Neurological:  Negative for headaches.       Objective   /70   Pulse 92   Temp (!) 96 °F (35.6 °C)   Resp 16   Ht 5' 4\" (1.626 m)   Wt 84.4 kg (186 lb)   LMP 2025 (Exact Date)   Breastfeeding Yes   BMI 31.93 kg/m²      Physical Exam  Vitals and nursing note reviewed.   Constitutional:       General: She is not in acute distress.     Appearance: Normal appearance.   HENT:      Head: Normocephalic and atraumatic.      Right Ear: Tympanic membrane is injected.      Left Ear: Tympanic membrane is injected.      Nose: Congestion present.      Right Turbinates: Swollen.      Left Turbinates: Swollen.      Mouth/Throat:      Pharynx: Oropharynx is clear.     Eyes:      Conjunctiva/sclera: Conjunctivae normal. "     Neck:      Vascular: No carotid bruit.     Cardiovascular:      Rate and Rhythm: Normal rate and regular rhythm.      Pulses: Normal pulses.      Heart sounds: Normal heart sounds. No murmur heard.  Pulmonary:      Effort: Pulmonary effort is normal.      Breath sounds: Normal breath sounds.   Lymphadenopathy:      Cervical: No cervical adenopathy.     Skin:     General: Skin is warm and dry.     Neurological:      Mental Status: She is alert.     Psychiatric:         Mood and Affect: Mood normal.         Behavior: Behavior normal.

## 2025-06-13 RX ORDER — AMOXICILLIN 875 MG/1
TABLET, COATED ORAL
Qty: 14 TABLET | Refills: 0 | Status: SHIPPED | OUTPATIENT
Start: 2025-06-13 | End: 2025-06-20

## (undated) DEVICE — SUT MONOCRYL 0 CTX 36 IN Y398H

## (undated) DEVICE — ADHESIVE SKIN HIGH VISCOSITY EXOFIN 1ML

## (undated) DEVICE — EXOFIN PRECISION PEN HIGH VISCOSITY TOPICAL SKIN ADHESIVE: Brand: EXOFIN PRECISION PEN, 1G

## (undated) DEVICE — PACK C-SECTION PBDS

## (undated) DEVICE — CHLORAPREP HI-LITE 26ML ORANGE

## (undated) DEVICE — Device

## (undated) DEVICE — SUT STRATAFIX SPIRAL 4-0 PGA/PCL 30 X 30 CM SXMD2B409

## (undated) DEVICE — SUT STRATAFIX SPIRAL PDS PLUS 1 CTX 18 IN SXPP1A400

## (undated) DEVICE — GLOVE INDICATOR PI UNDERGLOVE SZ 7 BLUE

## (undated) DEVICE — SKIN MARKER DUAL TIP WITH RULER CAP, FLEXIBLE RULER AND LABELS: Brand: DEVON